# Patient Record
Sex: FEMALE | URBAN - METROPOLITAN AREA
[De-identification: names, ages, dates, MRNs, and addresses within clinical notes are randomized per-mention and may not be internally consistent; named-entity substitution may affect disease eponyms.]

---

## 2019-01-05 ENCOUNTER — V-VISIT (OUTPATIENT)
Dept: INTERNAL MEDICINE | Age: 74
End: 2019-01-05

## 2019-01-05 DIAGNOSIS — J45.21 MILD INTERMITTENT ASTHMA WITH EXACERBATION: Primary | ICD-10-CM

## 2019-01-05 PROCEDURE — 98969 VIDEO E&M VISIT BY A QUALIFIED NONPHYSICIAN PROVIDER: CPT | Performed by: NURSE PRACTITIONER

## 2019-01-05 RX ORDER — LEVALBUTEROL TARTRATE 45 UG/1
1-2 AEROSOL, METERED ORAL EVERY 4 HOURS PRN
Qty: 1 INHALER | Refills: 0 | Status: SHIPPED | OUTPATIENT
Start: 2019-01-05

## 2021-11-02 ENCOUNTER — HOSPITAL ENCOUNTER (OUTPATIENT)
Age: 76
End: 2021-11-02
Payer: MEDICARE

## 2021-11-02 DIAGNOSIS — M20.11: Primary | ICD-10-CM

## 2021-11-02 DIAGNOSIS — M25.571: ICD-10-CM

## 2021-11-02 DIAGNOSIS — M79.671: ICD-10-CM

## 2021-11-02 DIAGNOSIS — M79.672: ICD-10-CM

## 2021-11-02 PROCEDURE — 73610 X-RAY EXAM OF ANKLE: CPT

## 2021-11-02 PROCEDURE — 73630 X-RAY EXAM OF FOOT: CPT

## 2022-10-28 ENCOUNTER — HOSPITAL ENCOUNTER (EMERGENCY)
Age: 77
LOS: 1 days | Discharge: HOME | End: 2022-10-29
Payer: MEDICARE

## 2022-10-28 VITALS
DIASTOLIC BLOOD PRESSURE: 88 MMHG | TEMPERATURE: 97.88 F | HEART RATE: 98 BPM | OXYGEN SATURATION: 98 % | SYSTOLIC BLOOD PRESSURE: 180 MMHG | RESPIRATION RATE: 18 BRPM

## 2022-10-28 VITALS — BODY MASS INDEX: 28.8 KG/M2
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
## 2022-10-28 DIAGNOSIS — S40.021A: ICD-10-CM

## 2022-10-28 DIAGNOSIS — M79.89: ICD-10-CM

## 2022-10-28 DIAGNOSIS — S40.011A: Primary | ICD-10-CM

## 2022-10-28 PROCEDURE — 73080 X-RAY EXAM OF ELBOW: CPT

## 2022-10-28 PROCEDURE — 73110 X-RAY EXAM OF WRIST: CPT

## 2022-10-28 PROCEDURE — 99283 EMERGENCY DEPT VISIT LOW MDM: CPT

## 2022-10-28 PROCEDURE — 73060 X-RAY EXAM OF HUMERUS: CPT

## 2022-10-28 PROCEDURE — 96372 THER/PROPH/DIAG INJ SC/IM: CPT

## 2022-10-28 PROCEDURE — 73030 X-RAY EXAM OF SHOULDER: CPT

## 2022-10-29 VITALS — RESPIRATION RATE: 16 BRPM

## 2022-12-14 ENCOUNTER — HOSPITAL ENCOUNTER (OUTPATIENT)
Dept: HOSPITAL 100 - EN | Age: 77
Discharge: HOME | End: 2022-12-14
Payer: MEDICARE

## 2022-12-14 VITALS
OXYGEN SATURATION: 93 % | HEART RATE: 102 BPM | RESPIRATION RATE: 18 BRPM | DIASTOLIC BLOOD PRESSURE: 85 MMHG | SYSTOLIC BLOOD PRESSURE: 92 MMHG

## 2022-12-14 VITALS
DIASTOLIC BLOOD PRESSURE: 85 MMHG | HEART RATE: 101 BPM | SYSTOLIC BLOOD PRESSURE: 96 MMHG | OXYGEN SATURATION: 92 % | TEMPERATURE: 97.2 F | RESPIRATION RATE: 18 BRPM

## 2022-12-14 VITALS
HEART RATE: 88 BPM | OXYGEN SATURATION: 93 % | RESPIRATION RATE: 18 BRPM | TEMPERATURE: 96.8 F | SYSTOLIC BLOOD PRESSURE: 101 MMHG | DIASTOLIC BLOOD PRESSURE: 53 MMHG

## 2022-12-14 VITALS — BODY MASS INDEX: 28 KG/M2

## 2022-12-14 VITALS
SYSTOLIC BLOOD PRESSURE: 83 MMHG | RESPIRATION RATE: 18 BRPM | HEART RATE: 96 BPM | OXYGEN SATURATION: 95 % | DIASTOLIC BLOOD PRESSURE: 51 MMHG

## 2022-12-14 VITALS
TEMPERATURE: 97.34 F | SYSTOLIC BLOOD PRESSURE: 136 MMHG | DIASTOLIC BLOOD PRESSURE: 85 MMHG | OXYGEN SATURATION: 100 % | HEART RATE: 98 BPM | RESPIRATION RATE: 18 BRPM

## 2022-12-14 VITALS
SYSTOLIC BLOOD PRESSURE: 95 MMHG | HEART RATE: 96 BPM | OXYGEN SATURATION: 97 % | RESPIRATION RATE: 18 BRPM | DIASTOLIC BLOOD PRESSURE: 85 MMHG

## 2022-12-14 VITALS — SYSTOLIC BLOOD PRESSURE: 136 MMHG | DIASTOLIC BLOOD PRESSURE: 85 MMHG

## 2022-12-14 DIAGNOSIS — K57.30: ICD-10-CM

## 2022-12-14 DIAGNOSIS — Z79.82: ICD-10-CM

## 2022-12-14 DIAGNOSIS — Z12.11: Primary | ICD-10-CM

## 2022-12-14 DIAGNOSIS — K29.50: ICD-10-CM

## 2022-12-14 DIAGNOSIS — K31.89: ICD-10-CM

## 2022-12-14 DIAGNOSIS — Z79.899: ICD-10-CM

## 2022-12-14 DIAGNOSIS — K44.9: ICD-10-CM

## 2022-12-14 DIAGNOSIS — K21.9: ICD-10-CM

## 2022-12-14 DIAGNOSIS — I10: ICD-10-CM

## 2022-12-14 DIAGNOSIS — E78.00: ICD-10-CM

## 2022-12-14 PROCEDURE — 88342 IMHCHEM/IMCYTCHM 1ST ANTB: CPT

## 2022-12-14 PROCEDURE — 0DJD8ZZ INSPECTION OF LOWER INTESTINAL TRACT, VIA NATURAL OR ARTIFICIAL OPENING ENDOSCOPIC: ICD-10-PCS | Performed by: SURGERY

## 2022-12-14 PROCEDURE — 43239 EGD BIOPSY SINGLE/MULTIPLE: CPT

## 2022-12-14 PROCEDURE — 88305 TISSUE EXAM BY PATHOLOGIST: CPT

## 2024-09-19 ENCOUNTER — HOSPITAL ENCOUNTER (EMERGENCY)
Age: 79
Discharge: HOME | End: 2024-09-19
Payer: MEDICARE

## 2024-09-19 VITALS — OXYGEN SATURATION: 99 % | HEART RATE: 140 BPM | RESPIRATION RATE: 18 BRPM

## 2024-09-19 VITALS — OXYGEN SATURATION: 100 %

## 2024-09-19 VITALS — BODY MASS INDEX: 30.2 KG/M2

## 2024-09-19 VITALS
OXYGEN SATURATION: 100 % | RESPIRATION RATE: 18 BRPM | DIASTOLIC BLOOD PRESSURE: 143 MMHG | SYSTOLIC BLOOD PRESSURE: 169 MMHG | HEART RATE: 119 BPM

## 2024-09-19 VITALS
DIASTOLIC BLOOD PRESSURE: 94 MMHG | HEART RATE: 118 BPM | TEMPERATURE: 97.9 F | SYSTOLIC BLOOD PRESSURE: 185 MMHG | RESPIRATION RATE: 20 BRPM | OXYGEN SATURATION: 96 %

## 2024-09-19 VITALS
OXYGEN SATURATION: 95 % | SYSTOLIC BLOOD PRESSURE: 166 MMHG | RESPIRATION RATE: 22 BRPM | TEMPERATURE: 98.42 F | HEART RATE: 123 BPM | DIASTOLIC BLOOD PRESSURE: 116 MMHG

## 2024-09-19 VITALS — OXYGEN SATURATION: 92 %

## 2024-09-19 DIAGNOSIS — Z79.899: ICD-10-CM

## 2024-09-19 DIAGNOSIS — Z98.42: ICD-10-CM

## 2024-09-19 DIAGNOSIS — E78.00: ICD-10-CM

## 2024-09-19 DIAGNOSIS — I10: ICD-10-CM

## 2024-09-19 DIAGNOSIS — Z98.41: ICD-10-CM

## 2024-09-19 DIAGNOSIS — Z90.710: ICD-10-CM

## 2024-09-19 DIAGNOSIS — J45.909: ICD-10-CM

## 2024-09-19 DIAGNOSIS — Z90.49: ICD-10-CM

## 2024-09-19 DIAGNOSIS — Z95.5: ICD-10-CM

## 2024-09-19 DIAGNOSIS — Z79.82: ICD-10-CM

## 2024-09-19 DIAGNOSIS — T59.811A: Primary | ICD-10-CM

## 2024-09-19 DIAGNOSIS — Z87.891: ICD-10-CM

## 2024-09-19 LAB
ANION GAP: 7 (ref 5–15)
BUN SERPL-MCNC: 14 MG/DL (ref 7–18)
BUN/CREAT RATIO: 11.1 RATIO (ref 10–20)
CALCIUM SERPL-MCNC: 9.8 MG/DL (ref 8.5–10.1)
CARBON DIOXIDE: 25 MMOL/L (ref 21–32)
CHLORIDE: 107 MMOL/L (ref 98–107)
DEPRECATED RDW RBC: 43 FL (ref 35.1–43.9)
ERYTHROCYTE [DISTWIDTH] IN BLOOD: 13.2 % (ref 11.6–14.6)
EST GLOM FILT RATE - AFR AMER: 53 ML/MIN (ref 60–?)
ESTIMATED CREATININE CLEARANCE: 36.39 ML/MIN
GLUCOSE: 123 MG/DL (ref 74–106)
HCT VFR BLD AUTO: 37.6 % (ref 37–47)
HEMOGLOBIN: 12.1 G/DL (ref 12–15)
HGB BLD-MCNC: 12.1 G/DL (ref 12–15)
IMMATURE GRANULOCYTES COUNT: 0.03 X10^3/UL (ref 0–0)
MCV RBC: 88.3 FL (ref 81–99)
MEAN CORP HGB CONC: 32.2 G/DL (ref 32–36)
MEAN PLATELET VOL.: 11.1 FL (ref 6.2–12)
NRBC FLAGGED BY ANALYZER: 0 % (ref 0–5)
PLATELET # BLD: 212 K/MM3 (ref 150–450)
PLATELET COUNT: 212 K/MM3 (ref 150–450)
POTASSIUM: 3.4 MMOL/L (ref 3.5–5.1)
RBC # BLD AUTO: 4.26 M/MM3 (ref 4.2–5.4)
RBC DISTRIBUTION WIDTH CV: 13.2 % (ref 11.6–14.6)
RBC DISTRIBUTION WIDTH SD: 43 FL (ref 35.1–43.9)
WBC # BLD AUTO: 7.3 K/MM3 (ref 4.4–11)
WHITE BLOOD COUNT: 7.3 K/MM3 (ref 4.4–11)

## 2024-09-19 PROCEDURE — 99284 EMERGENCY DEPT VISIT MOD MDM: CPT

## 2024-09-19 PROCEDURE — 85025 COMPLETE CBC W/AUTO DIFF WBC: CPT

## 2024-09-19 PROCEDURE — A4216 STERILE WATER/SALINE, 10 ML: HCPCS

## 2024-09-19 PROCEDURE — 94640 AIRWAY INHALATION TREATMENT: CPT

## 2024-09-19 PROCEDURE — 80048 BASIC METABOLIC PNL TOTAL CA: CPT

## 2024-09-19 PROCEDURE — 71046 X-RAY EXAM CHEST 2 VIEWS: CPT
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
## 2024-09-19 NOTE — XMS RPT_ITS
Comprehensive CCD (C-CDA v2.1)  
  
                         Created on: 2024  
  
  
MS. ELMIRA WARNER  
External Reference #: CDR,PersonID:[id number]  
: 1945  
Sex: Female  
  
Demographics  
  
  
                                        Address             [address] [place]  [address]  
   
                                        Home Phone          [phone]  
   
                                        Home Phone          [phone]  
   
                                        Home Phone          [phone]  
   
                                        Home Phone          [phone]  
   
                                        Work Phone          [phone]  
   
                                        Preferred Language  en  
   
                                        Marital Status        
   
                                        Nondenominational Affiliation Unknown  
   
                                        Race                White  
   
                                        Ethnic Group        Not  or Lati  
no  
  
  
Author  
  
  
                                        Organization        [facility]  
  
  
Care Team Providers  
  
  
                                Care Team Member Name Role            Phone  
   
                                No, Physician   Unavailable     Unavailable  
   
                                QUICK, DAIJA    Unavailable     Unavailable  
   
                                IRLANDA FUCHS   Attending       Unavailable  
   
                                IRLANDA FUCHS   Attending       Unavailable  
   
                                IRLANDA FUCHS   Referring       Unavailable  
   
                                No, Physician   Primary Care Provider Unavailmatthew  
e  
   
                                BEBETO RODRIGUEZ Primary Care    Unavailable  
   
                                QUICK, DAIJA S  Referring       Unavailable  
   
                                QUICK, DAIJA S  Attending       Unavailable  
   
                                QUICK, DAIJA S  Attending       Unavailable  
   
                                BEBETO RODRIGUEZ Primary Care    Unavailable  
   
                                QUICK, DAIJA S  Referring       Unavailable  
   
                                Bebeto Rodriguez Primary Care Provider [phone]  
  
   
                                Bebeto Rodriguez DO Primary Care Provider [phone]  
[phone]  
   
                                Bebeto Rodriguez DO Primary Care Provider [phone]  
[phone]  
   
                                Bebeto Rodriguez DO Primary Care Provider [phone]  
[phone]  
   
                                Bebeto Rodriguez DO Primary Care Provider [phone]  
[phone]  
   
                                Bebeto Rodriguez DO Primary Care Provider [phone]  
[phone]  
   
                                JOHN KUMARI Attending       Unavailable  
   
                                BEBETO RODRIGUEZ Primary Care    Unavailable  
   
                                BEBETO RODRIGUEZ Primary Care    Unavailable  
   
                                JOHN KUMARI Attending       Unavailable  
   
                                BEBETO RODRIGUEZ Primary Care    Unavailable  
   
                                ANAHY FINNEGAN Attending       Unavailable  
   
                                Bebeto Rodriguez Primary Care Provider [phone]  
  
   
                                LAURA TINAJERO Attending       Unavailable  
   
                                BEBETO RODRIGUEZ Primary Care    Unavailable  
   
                                LAURA TINAJERO Referring       Unavailable  
   
                                BEBETO RODRIGUEZ Primary Care    Unavailable  
   
                                Bebeto Rodriguez DO Primary Care Provider [phone]  
[phone]  
   
                                BEBETO RODRIGUEZ Primary Care    Unavailable  
   
                                MARIA E GUZMÁN Attending       Unavailable  
   
                                EUGENE SEPULVEDA JR Referring       Unavailable  
   
                                EUGENE SEPULVEDA JR Referring       Unavailable  
   
                                RODRIGUEZ, BEBETO L Primary Care    Unavailable  
   
                                RODRIGUEZ, BEBETO L Referring       Unavailable  
   
                                EUGENE SEPULVEDA JR Attending       Unavailable  
   
                                RODRIGUEZ, BEBETO L Primary Care    Unavailable  
   
                                RODRIGUEZ, BEBETO L Referring       Unavailable  
   
                                RODRIGUEZ, BEBETO L Primary Care    Unavailable  
   
                                RODRIGUEZ, BEBETO L Referring       Unavailable  
   
                                RODRIGUEZ, BEBETO L Primary Care    Unavailable  
   
                                RODRIGUEZ, BEBETO L Referring       Unavailable  
   
                                RODRIGUEZ, BEBETO L Primary Care    Unavailable  
   
                                RODRIGUEZ, BEBETO L Attending       Unavailable  
   
                                RODRIGUEZ, BEBETO L Primary Care    Unavailable  
   
                                RODRIGUEZ, BEBETO L Primary Care    Unavailable  
   
                                SLEIK, KHALED MELOUD Referring       Unavailable  
   
                                SLEIK, KHALED MELOUD Attending       Unavailable  
   
                                RODRIGUEZ, BEBETO L Primary Care    Unavailable  
   
                                SHEN, YANET  Referring       Unavailable  
   
                                RODRIGUEZ, BEBETO L Primary Care    Unavailable  
   
                                SHEN, YANET  Attending       Unavailable  
   
                                RODRIGUEZ, BEBETO L Primary Care    Unavailable  
   
                                COCO ELLIS, EUGENE J Referring       Unavailable  
   
                                RODRIGUEZ, BEBETO L Primary Care    Unavailable  
   
                                SHEN, YANET  Attending       Unavailable  
   
                                RODRIGUEZ, BEBETO L Primary Care    Unavailable  
   
                                LIZETTE BROWNE Attending       Unavailable  
  
  
  
Allergies  
  
  
                                                    Allergy   
Classification                          Reported   
Allergen(s)               Allergy Type              Date of   
Onset                     Reaction(s)               Facility  
   
                                                      
(6 sources)                             atorvastatin;   
Translations:   
[ATORVASTATIN]                          Propensity to   
adverse   
reactions to   
drug                                      
5                                                   [facility]  
   
                                                      
(20 sources)                            iodine;   
Translations:   
[IODINE]                                Propensity to   
adverse   
reactions to   
drug                                      
2                                       GI   
Intolerance,   
Other: See   
Comments,   
Nausea And   
Vomiting,   
Other (See   
Comments),   
Other                                   [facility]  
   
                                                      
(11 sources)                            meperidine;   
Translations:   
[MEPERIDINE]                            Propensity to   
adverse   
reactions to   
drug                                      
2                                       GI   
Intolerance,   
Nausea And   
Vomiting,   
Other,   
Vomiting Only                           [facility]  
   
                                                      
(20 sources)                            meperidine;   
Translations:   
[MEPERIDINE   
(PF)]                                   Propensity to   
adverse   
reactions to   
drug                                      
2                         Vomiting                  [facility]  
   
                                                      
(10 sources)                            Penicillins;   
Translations:   
[PENICILLINS]                           Propensity to   
adverse   
reactions to   
drug                                      
2                                       Hives, Rash,   
Swelling                                [facility]  
   
                                                      
(20 sources)                            ADHESIVE   
TAPE-SILICONES;   
Translations:   
[ADHESIVE   
TAPE-SILICONES]                         Propensity to   
adverse   
reactions to   
drug                                      
2                         Rash                      [facility]  
   
                                                      
(3 sources)                             CT: IODINATED   
CONTRAST- ORAL   
AND IV DYE;   
Translations:   
[CT: IODINATED   
CONTRAST- ORAL   
AND IV DYE]                             Propensity to   
adverse   
reactions to   
drug                                      
7                                                   [facility]  
   
                                                      
(20 sources)                            INFLUENZA   
VACCINE TRI-SP   
09-10;   
Translations:   
[INFLUENZA   
VACCINE TRI-SP   
09-10]                                  Propensity to   
adverse   
reactions to   
drug                                      
2                         Diarrhea                  [facility]  
   
                                                      
(4 sources)                             SHELLFISH   
CONTAINING   
PRODUCTS;   
Translations:   
[SHELLFISH   
CONTAINING   
PRODUCTS]                               Propensity to   
adverse   
reactions to   
drug                                      
2                         Hives                     [facility]  
   
                                                      
(5 sources)                             Adhesive Tape;   
Translations:   
[ADHESIVE TAPE]                         Propensity to   
adverse   
reactions to   
drug (disorder)                           
7                         [facility]   
Repository  
   
                                                      
(2 sources)                             IODINATED   
CONTRAST- ORAL   
AND IV DYE;   
Translations:   
[IODINATED   
CONTRAST- ORAL   
AND IV DYE]                             Propensity to   
adverse   
reactions to   
drug (disorder)                           
7                         [facility]                       [facility]   
Repository  
   
                                                      
(5 sources)         Penicillins         Drug Allergy          
2                         [place]  
Work Phone:   
[phone]  
0  
   
                                                      
(20 sources)                            Shellfish;   
Translations:   
[SHELLFISH]               Food Allergy                
2                         [place]  
Work Phone:   
[phone]  
0  
   
                                                      
(4 sources)         atorvastatin        Drug Allergy          
5                                                   [facility]  
   
                                                      
(4 sources)         Meperidine          Drug Allergy          
2                                       Nausea And   
Vomiting                                [facility]  
Work Phone:   
[phone]  
2  
   
                                                      
(4 sources)                             Shellfish-Derive  
d Products                              Propensity to   
adverse   
reactions to   
drug                                      
2                                                   [facility]  
Work Phone:   
[phone]  
2  
   
                                                      
(5 sources)                             Latex;   
Translations:   
[LATEX]                                 Propensity to   
adverse   
reactions to   
drug                                      
2                         Itching, Rash             [facility]  
   
                                                      
(20 sources)        Penicillins         Drug Allergy          
2                                       Hives, Rash,   
Swelling                                [facility]  
Work Phone:   
[phone]  
0  
   
                                                      
(3 sources)                             Adhesive agent;   
Translations:   
[ADHESIVE]                              Propensity to   
adverse   
reactions to   
drug                                      
2                         [place]  
   
                                                      
(2 sources)               Shellfish                 Propensity to   
adverse   
reactions to   
drug                                      
2                         [place]  
   
                                                      
(3 sources)                             Ct: Iodinated   
Contrast- Oral   
And Iv Dye                              Propensity to   
adverse   
reactions to   
drug                                      
7                                       Nausea And   
Vomiting                                [facility]  
   
                                                      
(1 source)                Adhesive Tape             Drug   
Intolerance                               
2                         [facility]  
   
                                                      
(1 source)                Latex                     Propensity to   
adverse   
reactions                                 
2                         Itching, Rash             [facility]  
   
                                                      
(1 source)          Penicillins         Drug Allergy          
2                                       Hives, Rash,   
Swelling                                [facility]  
  
  
  
Medications  
Current Medications  
  
  
  
                      Medication Drug Class(es) Dates      Sig (Normalized) Sig   
(Original)  
   
                                                    albuterol 0.83   
mg/ml inhalation   
solution  
(20 sources)                            beta2-Adrenergic   
Agonist                                 Start:   
2022                                          2.5 mg, Nebulization,   
EVERY 6 HOURS PRN,   
Starting on 22 at 1324,   
Until Discontinued,   
Wheezing, Shortness   
of Breath Initiate RT   
Bronchodilator   
Protocol: Yes  
  
  
  
                                                    Start: 2022  
End: 07-                                     albuterol (PROVENTIL) 2.5 mg  
 /3 mL (0.083 %) nebulizer solution  
   
Indications: Mild intermittent asthma without complication Use 3   
mL via nebulizer every 6 hours as needed. 30 mL 07/15/2024 Active  
   
                                Start: 2022                 albuterol (2.5  
 MG/3ML) 0.083% nebulizer solution 2.5 mg. 0   
2022 Active  
   
                                                                albuterol (PROVE  
NTIL) 2.5 mg /3 mL (0.083 %) nebulizer solution   
Inhale 3 mL (2.5 mg total) . 0 Active  
   
                                                                albuterol (PROVE  
NTIL) 2.5 mg /3 mL (0.083 %) nebulizer solution   
Inhale 2.5 mg. 0 Active  
   
                                                                albuterol (PROVE  
NTIL) 2.5 mg /3 mL (0.083 %) nebulizer solution   
Inhale 2.5 mg. Active  
  
  
  
                                        Comment on above:   Use 3 mL via nebuliz  
er every 6 hours as needed.   
   
                                                    ascorbic acid 1000   
mg oral tablet  
(20 sources)                                        Start:   
2022                                          ascorbic acid   
(Vitamin C) 1000   
MG tablet Take by   
mouth. 0   
2022 Active  
   
                                        Comment on above:   Take 1,000 mg by solitario  
 once daily.   
   
                                                    aspirin 81 mg   
delayed release   
oral tablet  
(20 sources)                            Nonsteroidal   
Anti-inflammatory Drug                  Start:   
2022                              take 1 dose by   
mouth once                              162 mg, Oral,   
ONCE, 1 dose, On   
22 at   
1345 Do not crush   
or break.  
  
  
  
                                                take 2 tablets by mouth once mikie  
ly Aspirin 81 mg Tab Take 162 mg by mouth once   
daily. Active  
   
                                                take 1 tablet by mouth once dorothea  
y Aspirin 81 mg Tab Take 81 mg by mouth once   
daily. 0 Active  
   
                                                                aspirin 81 MG ch  
ewable tablet Chew 81 mg. 0   
Active  
   
                                                take 1 tablet by mouth once dorothea  
y aspirin 81 MG EC tablet Take 1 (one) tablet   
(81   
mg total) by mouth daily . 0 Active  
   
                                                                aspirin 81 MG EC  
 tablet 162 mg 0 Suspended  
  
  
  
                                        Comment on above:   Take 81 mg by mouth   
once daily.  
   
   
                                                    aspirin 81 mg / calcium   
carbonate 777 mg oral   
tablet  
(1 source)                              Platelet Aggregation   
Inhibitor, Nonsteroidal   
Anti-inflammatory Drug                                         Aspirin-Calcium   
Carbonate  MG   
tablet Take 81 mg by   
mouth. 0 Active  
   
                                                    bisacodyl 5 mg delayed   
release oral tablet  
(20 sources)              Stimulant Laxative        Start:   
2021                                          Bisacodyl (DULCOLAX) 5   
mg tab Indications:   
Special screening for   
malignant neoplasms,   
colon Use as directed   
for Miralax / Gatorade   
Bowel Prep Kit 4   
tablet 2021   
Active  
  
  
  
                                Start: 2021                 bisacodyl (DUL  
COLAX) 5 MG EC tablet Use as directed for   
Miralax /   
Gatorade Bowel Prep Kit 0 2021 Suspended  
  
  
  
                                        Comment on above:   Use as directed for   
Miralax / Gatorade Bowel Prep Kit   
   
                                                    bisoprolol fumarate 5 mg   
oral tablet  
(5 sources)                             beta-Adrenergic   
Blocker                                 Start:   
2018                                          bisoprolol (ZEBETA) 5   
MG tablet  
   
                                                    brimonidine tartrate 2 mg/ml  
   
ophthalmic solution  
(4 sources)                             alpha-Adrenergic   
Agonist                                 Start:   
2018                                          brimonidine (ALPHAGAN)   
0.2 % ophthalmic   
solution  
   
                                                    CALCIUM CARB-CHOLECALCIFEROL  
   
PO  
(1 source)                                                      CALCIUM   
CARB-CHOLECALCIFEROL PO   
Take by mouth. 0 Active  
   
                                                    calcium carbonate 1500 mg /   
cholecalciferol 200 unt oral   
tablet  
(7 sources)     Vitamin D                                       calcium   
carbonate-vitamin D3   
600 mg(1,500mg) -200   
unit per tablet Take by   
mouth. 0 Active  
  
  
  
                                                take 600 tablets by mouth once c  
alcium carbonate-vitamin D3 600 mg(1,500mg) -  
200   
unit per tablet Take by mouth. Active  
  
  
  
                                                    cetirizine   
hydrochloride 10 mg   
oral tablet  
(2 sources)                             Histamine-1   
Receptor   
Antagonist                              Start:   
2024  
End: 2024                         take 1 tablet   
by mouth once   
daily                                   cetirizine   
(ZYRTEC) 10 mg   
tablet   
Indications: Mild   
intermittent   
asthma without   
complication ,   
Seasonal allergies   
Take 1 tablet by   
mouth once daily.   
30 tablet 2   
2024 Active  
   
                                                    cholecalciferol 0.025   
mg oral capsule  
(20 sources)        Vitamin D                               take 1   
capsule by   
mouth once   
daily                                   Cholecalciferol,   
Vitamin D3, 25 mcg   
(1,000 unit) cap   
Take 1,000 Units   
by mouth once   
daily. Active  
  
  
  
                                                                cholecalciferol,  
 vitamin D3, 1,000 unit capsule Take 1 (one) capsule (1,000   
Units   
total) by mouth . 0 Active  
   
                                                                Cholecalciferol   
50 MCG (2000 UT) CAPS D3-2000 50 mcg (2,000 unit) capsule Take  
 by   
oral route. 0 Suspended  
  
  
  
                                        Comment on above:   Take 1,000 Units by   
mouth once daily.   
   
                                                    clopidogrel 75 mg oral   
tablet  
(12 sources)                            P2Y12 Platelet   
Inhibitor                               Start:   
2018  
End:   
04-                                          clopidogrel (PLAVIX) 75   
mg tablet  
   
                                        Comment on above:   Take 75 mg by mouth   
once daily.   
   
                                                    ubidecarenone 10 mg oral   
capsule  
(20 sources)                                          
End:   
2022                                          coenzyme Q10 10 mg   
capsule Take 10 mg by   
mouth. 0 Active  
  
  
  
                                                                coenzyme Q10 200  
 MG CAPS capsule Co Q-10 200 mg capsule Take by oral route. 0   
Suspended  
  
  
  
                                        Comment on above:   Take 10 mg by mouth   
once daily.   
   
                                                    cyclobenzaprine   
hydrochloride 10 mg   
oral tablet  
(3 sources)               Muscle Relaxant           Start:   
20  
End:   
20                                      take 1 tablet by   
mouth every   
eight hours as   
needed for   
headache and   
headache                                cyclobenzaprine   
(FLEXERIL) 10 mg   
tablet Indications:   
Headaches Take 1   
tablet by mouth three   
times a day as needed   
for muscle spasm. 15   
tablet 0 2023 Active  
  
  
  
                                                    Start: 2022  
End: 2022                         take 1 tablet by mouth three   
times daily as needed for   
muscle spasms                           cyclobenzaprine (FLEXERIL) 5 MG tablet   
Take 1 tablet by mouth 3 times daily   
as needed for Muscle spasms 30 tablet   
0 2022 Active  
  
  
  
                                        Comment on above:   Take 1 tablet by solitario  
 three times a day as needed for muscle   
spasm.   
   
                                                    1 ml dexamethasone   
phosphate 4 mg/ml   
injection  
(2 sources)               Corticosteroid            Start:   
                                                 6 mg, IntraVENous,   
EVERY 6 HOURS, First   
dose on Mon 22 at   
1345, Post-op  
   
                                                    docusate sodium 100   
mg oral capsule  
(2 sources)                                         Start:   
  
End:   
                                     take 1 capsule   
by mouth twice   
daily                                   docusate sodium   
(COLACE) 100 MG capsule   
Take 1 capsule by mouth   
2 times daily for 14   
days 28 capsule 0   
2022   
Active  
   
                                                    famotidine 40 mg   
oral tablet  
(4 sources)                             Histamine-2 Receptor   
Antagonist                              Start:   
  
End:   
  
023                                     take 1 tablet   
by mouth once   
daily                                   famotidine (PEPCID) 40   
MG tablet Indications:   
Gastroesophageal reflux   
disease, unspecified   
whether esophagitis   
present Take 1 (one)   
tablet (40 mg total) by   
mouth daily . 90 tablet   
3 2022   
Active  
  
  
  
                                                    Start: 2022  
End: 2022                                     famotidine (PEPCID) tablet 2  
0 mg  
  
  
  
                                                    fluocinolone acetonide   
0.1 mg/ml otic solution  
(3 sources)     Corticosteroid  Start: 2022                 fluocinolone a  
cetonide   
oiL 0.01 % Drop   
Indications: Chronic   
eczematous otitis externa   
of both ears Administer 4   
drops into ears Monday,   
Wednesday, Friday Start:   
22. 20 mL 3   
2022 Active  
  
  
  
                                Start: 2022                 fluocinolone (  
DermOtic) 0.01 % ear drops Administer 4 drops   
into   
ears Monday, Wednesday, Friday Start 22. 0 2022   
Active  
  
  
  
                                                    folic acid 0.8 mg oral   
capsule  
(19 sources)                                                    folic acid 0.8 m  
g cap Take   
300 mg by mouth once daily.   
Active  
   
                                        Comment on above:   Take 300 mg by mouth  
 once daily.   
   
                                                    Lactobac no.41/Bifidobact   
no.7 (PROBIOTIC-10 ORAL)  
(20 sources)                                                    Lactobac no.41/B  
ifidobact   
no.7 (PROBIOTIC-10 ORAL)   
Take by mouth. Active  
  
  
  
                                                                Lactobac no.41/B  
ifidobact no.7 (PROBIOTIC-10 ORAL) Take by mouth. 0 Active  
  
  
  
                                        Comment on above:   Take by mouth.   
   
                                                    200 actuat levalbuterol   
0.045 mg/actuat metered   
dose inhaler  
(20 sources)                            beta2-Adrenergic   
Agonist                                 Start:   
2022                                          levalbuterol (XOPENEX)   
nebulization 0.63 mg  
  
  
  
                                                    Start: 04-  
End: 2023                         take 2 puff(s) by inhalation   
every six hours as needed               levalbuterol tartrate HFA (XOPENEX   
HFA) 45 mcg/actuation inhaler   
Indications: Mild intermittent   
asthma, uncomplicated Inhale 2   
Puffs as instructed every 6 hours   
as needed. 1 Each 1 2023   
Active  
   
                                                      
End: 2023                         take 2 puff(s) by inhalation   
every six hours as needed               levalbuterol (Xopenex) 45 MCG/ACT   
inhaler levalbuterol HFA 45   
mcg/actuation aerosol inhaler   
Inhale 2 Puffs as instructed every   
6 hours as needed. 0 2023   
Discontinued (Therapy completed)  
   
                                                                levalbuterol (XO  
PENEX HFA) 45   
mcg/actuation inhaler Inhale. 0   
Active  
   
                                                                levalbuterol (XO  
PENEX HFA) 45   
mcg/actuation inhaler Inhale.   
Active  
  
  
  
                                        Comment on above:   Inhale 2 Puffs as in  
structed every 6 hours as needed.   
   
                                                    linseed oil 1000 mg   
oral capsule  
(4 sources)                                                 take 1 capsule   
by mouth once   
daily                                   Flaxseed, Linseed,   
(Flax Seed Oil) 1000   
MG capsule Take 1   
capsule by mouth   
daily. 0 Active  
   
                                                    losartan potassium   
50 mg oral tablet  
(7 sources)                             Angiotensin 2   
Receptor Blocker                        Start:   
                                       take 1 tablet by   
mouth once daily                        losartan (COZAAR) 50   
mg tablet   
Indications:   
Essential   
hypertension Take 1   
tablet by mouth once   
daily. 2024   
Active  
  
  
  
                                                    Start: 2024  
End: 10-                         take 1 tablet by mouth once   
daily                                   losartan (COZAAR) 25 mg tablet   
Indications: Essential hypertension   
Take 1 tablet by mouth once daily.   
90 tablet 0 2024   
Discontinued  
  
  
  
                                                    magnesium oxide 400 mg   
oral tablet  
(20 sources)                                          
End: 2022                                     magnesium oxide   
(MAG-OX) 400 mg tablet   
Take by mouth. 0 Active  
   
                                        Comment on above:   Take 400 mg by mouth  
 once daily.   
   
                                                    10 ml methocarbamol 100   
mg/ml injection  
(4 sources)     Muscle Relaxant Start: 2022                 methocarbamol   
(ROBAXIN)   
injection 1,000 mg  
  
  
  
                                                    Start: 2022  
End: 2022                         take 2 tablets by mouth three   
times daily                             methocarbamol (ROBAXIN-750) 750 MG   
tablet Take 2 tablets by mouth 3   
times daily for 14 days 84 tablet 0   
2022 Discontinued   
(Stop Taking at Discharge)  
  
  
  
                                                    1 ml morphine sulfate 4   
mg/ml injection  
(4 sources)     Opioid Agonist  Start: 2022                 morphine sulfa  
te (PF)   
injection 1 mg  
  
  
  
                                Start: 2022                 morphine sulfa  
te (PF) injection 2 mg  
   
                                                    Start: 2022  
End: 2022                         take 4 mg by mouth every three   
hours as needed for pain                4 mg, IntraVENous, EVERY 3 HOURS   
PRN, Starting on 22 at   
1324, Until 22 at 1505,   
Pain Severe (7-10) If oral and IV   
narcotics ordered, use oral first   
and only use IV if oral is   
ineffective or cannot take   
oral.&nbsp;&nbsp;Do Not give oral   
and IV within 1 hour of each other   
unless specifically ordered. Post-op  
  
  
  
                                                    Multiple Vitamins-Minerals (  
Centrum   
Silver 50+Women) tablet  
(1 source)                                                      Multiple Vitamin  
s-Minerals (Centrum   
Silver 50+Women) tablet Take by mouth.   
0 Active  
   
                                                    Multivitamin preparation  
(20 sources)                                                    MULTIVITAMIN (VI  
TAMIN A DAY ORAL) Take   
by mouth. Active  
  
  
  
                                                                MULTIVITAMIN (VI  
TAMIN A DAY ORAL) Take by mouth. 0 Active  
  
  
  
                                        Comment on above:   Take by mouth.   
   
                                                    naproxen 500 mg   
oral tablet  
(8 sources)                             Nonsteroidal   
Anti-inflammatory   
Drug                                    Start:   
20  
End:   
20                                      take 1 tablet by   
mouth twice   
daily at   
mealtime as   
needed for pain                         naproxen (Naprosyn)   
500 MG tablet Take 1   
tablet by mouth twice   
daily as needed (for   
pain/inflammation)   
for up to 14 days.   
Take with food. 0   
2022 Active  
   
                                        Comment on above:   Take 1 tablet by [facility] twice daily as needed (for   
pain/inflammation) for up to 14 days. Take with food.   
   
                                                    Nebulizers (AERONEB   
GO NEBULIZER)  
(20 sources)                                        Start:   
04-15-20  
20                                                  Nebulizers (AERONEB   
GO NEBULIZER)   
Indications: Mild   
intermittent asthma,   
uncomplicated 1 Each   
as directed. Portable   
Nebulizer with   
tubing, Dx: mild   
persistent asthma 1   
Each 04/15/2020   
Active  
  
  
  
                                Start: 04-                 Nebulizers (AE  
RONEB GO NEBULIZER) Indications: Mild   
intermittent   
asthma, uncomplicated 1 Each as directed. Portable Nebulizer with   
tubing, Dx: mild persistent asthma 1 Each 0 04/15/2020 Active  
  
  
  
                                        Comment on above:   1 Each as directed.   
Portable Nebulizer with tubing, Dx: mild   
persistent asthma   
   
                                                    nitroglycerin 0.4 mg   
sublingual tablet  
(20 sources)              Nitrate Vasodilator       Start:   
01-  
End:   
10-                                          nitroglycerin sublingual   
(NITROSTAT) 0.4 mg SL   
tablet Indications:   
Coronary artery disease   
involving native heart   
without angina pectoris,   
unspecified vessel or   
lesion type Dissolve 1   
tablet under the tongue   
every 5 minutes as   
needed for chest pain.   
Max of 3 doses. If no   
relief, call 911. 25   
tablet 07/15/2024   
10/13/2024 Active  
  
  
  
                                                                nitroglycerin (N  
itrostat) 0.4 MG SL tablet nitroglycerin 0.4 mg sublingual   
tablet   
0 Active  
  
  
  
                                        Comment on above:   Dissolve 1 tablet un  
becka the tongue every 5 minutes as needed   
for Chest Pain. Max of 3 doses. If no relief, call 911.   
   
                                                    ofloxacin 3 mg/ml   
ophthalmic solution  
(4 sources)               Quinolone Antimicrobial   Start:   
2018                                          ofloxacin (OCUFLOX) 0.3   
% ophthalmic solution  
   
                                                    ondansetron (ZOFRAN-ODT)   
disintegrating tablet 4   
mg  
(2 sources)                                         Start:   
2022                                          ondansetron (ZOFRAN-ODT)   
disintegrating tablet 4   
mg  
   
                                                    oxyCODONE hydrochloride 5   
mg oral tablet  
(3 sources)               Opioid Agonist            Start:   
2022                                          oxyCODONE (ROXICODONE)   
immediate release tablet   
2.5 mg  
  
  
  
                                Start: 2022                 oxyCODONE (RAPHAEL  
ICODONE) immediate release tablet 5 mg  
  
  
  
                                                    phenol 14 mg/ml   
mouthwash  
(1 source)                      Start: 2022                 phenol 1.4 % m  
outh spray   
1 spray  
   
                                                    polyethylene glycol 3350   
06339 mg powder for oral   
solution  
(9 sources)               Osmotic Laxative          Start: 04-  
End: 05-                                     polyethylene glycol 3350   
(MIRALAX) 17 gram/dose   
powder Indications:   
Chronic constipation   
Take 17 g by mouth as   
needed for constipation.   
116 g 0 04/15/2022   
05/15/2022 Active  
  
  
  
                                Start: 2021                 polyethylene g  
lycol (GLYCOLAX) 17 GM/SCOOP powder Use as   
directed   
for Miralax / Gatorade Bowel Prep Kit 0 2021 Suspended  
   
                                                    Start: 2021  
End: 04-                                     polyethylene glycol 3350 (MI  
RALAX, GLYCOLAX) 17 gram/dose   
powder   
Indications: Special screening for malignant neoplasms, colon Use   
as directed for Miralax / Gatorade Bowel Prep Kit 238 g 0   
2021 04/15/2022 Discontinued  
  
  
  
                                        Comment on above:   Use as directed for   
Miralax / Gatorade Bowel Prep Kit   
   
                                                            Take 17 g by mouth a  
s needed for constipation.   
   
                                                    potassium chloride,   
bulk, Powd  
(3 sources)                                                     potassium chlori  
de, bulk,   
Powd Take 10 mEq by   
mouth. 0 Active  
   
                                                    potassium gluconate 2.6   
meq oral tablet  
(20 sources)                                        Start:   
2022                                          1 gram potassium   
gluconate = 167 mg   
elemental potassium = 4.3   
mEq potassium = 4.3 mmol   
potassium 550 mg, Oral,   
DAILY, First dose on 22 at 1345, Until   
Discontinued  
  
  
  
                                                            take 1 tablet by solitario  
th once   
daily                                   Potassium Gluconate 2.5 MEQ tablet   
Take 1 tablet by mouth daily. 0   
Active  
   
                                                      
End: 2022                         take 1 tablet by mouth once   
daily                                   Potassium 99 mg tab Take 1 tablet by   
mouth once daily. 0 2022   
Discontinued  
   
                                                            take 1 tablet by solitario  
th once   
daily                                   potassium gluconate 550 mg tablet   
Take 1 tablet by mouth daily 0   
Suspended  
  
  
  
                                        Comment on above:   Take 1 tablet by solitario  
th once daily.   
   
                                                    prednisoLONE acetate   
10 mg/ml ophthalmic   
suspension  
(4 sources)               Corticosteroid            Start:   
20                                                  prednisoLONE acetate   
(PRED FORTE) 1 %   
ophthalmic   
suspension  
   
                                                    saccharomyces   
boulardii 250 mg oral   
capsule  
(4 sources)                                         Start:   
20                                      take 250 mg   
by mouth once   
daily                                   250 mg, Oral, DAILY,   
First dose on 22 at 1345,   
Until Discontinued  
  
  
  
                                                                saccharomyces yesy  
ulardii (FLORASTOR) 250 MG capsule Take by mouth daily 0   
Suspended  
  
  
  
                                                    traMADol   
hydrochloride 50 mg   
oral tablet  
(5 sources)               Opioid Agonist            Start: 2022  
End: 2022                         take 1 tablet   
by mouth   
every six   
hours as   
needed for   
pain                                    traMADol (ULTRAM)   
50 mg tablet   
Indications: Neck   
pain, chronic Take   
1 tablet by mouth   
every 6 hours as   
needed for pain for   
up to 5 days. 20   
tablet 0 2022 Active  
   
                                        Comment on above:   Take 1 tablet by solitario  
th every 6 hours as needed for pain for up  
   
to 5 days.   
   
                                                            Take 50 mg by mouth   
every 6 hours as needed for pain.   
   
                                                    TUMERIC-GING-OLIVE-OR  
EG-CAPRYL ORAL  
(20 sources)                                                    TUMERIC-GING-GUSTAVO  
VE-  
OREG-CAPRYL ORAL   
Take by mouth.   
Active  
  
  
  
                                                                TUMERIC-GING-GUSTAVO  
TS-WOWF-YIWNGA ORAL Take by mouth. 0 Active  
  
  
  
                                        Comment on above:   Take by mouth.   
   
                                                    vit B complex no.12/niacin,B  
3,   
(VITAMIN B COMPLEX NO.12-NIACIN   
ORAL)  
(20 sources)                                                    vit B complex no  
.12/niacin,B3,   
(VITAMIN B COMPLEX NO.12-NIACIN   
ORAL) Take by mouth. Active  
  
  
  
                                                                vit B complex no  
.12/niacin,B3, (VITAMIN B COMPLEX NO.12-NIACIN ORAL) Take by   
mouth. 0 Active  
  
  
  
                                        Comment on above:   Take by mouth.   
   
                                                    vit B complex-C-folic ac-zin  
c 800   
mcg- 12.5 mg tab  
(19 sources)                                                    vit B complex-C-  
folic ac-zinc   
800 mcg- 12.5 mg tab Take by   
mouth. Active  
  
  
  
                                                                vit B complex-C-  
folic ac-zinc 800 mcg- 12.5 mg tab Take by mouth. 0 Active  
  
  
  
                                        Comment on above:   Take by mouth.   
   
                                                    vitamin a 97964 unt oral   
tablet  
(4 sources)     Vitamin A       Start: 2022                 Vitamin A (bet  
a carotene)   
3 MG (55657 UT) tablet   
Take by mouth. 0   
2022 Active  
  
  
  
                                                take 1 capsule by mouth once mikie  
ly Vitamin A 2400 MCG (8000 UT) CAPS vitamin A   
2,400 mcg capsule Take 1 capsule every day by   
oral route. 0 Suspended  
  
  
  
                                                    vitamin b6 50 mg oral tablet  
(7 sources)                                                     pyridoxine, linda  
min B6, (B-6) 50 MG tablet Take 2   
(two) tablets (100 mg total) by mouth . 0 Active  
  
  
  
                                                take 1 tablet by mouth once dorothea  
y pyridoxine (B-6) 100 MG tablet Take 1 tablet   
by   
mouth daily 0 Suspended  
   
                                                                pyridoxine, linda  
min B6, (B-6) 50 MG tablet Take   
100 mg by mouth. 0 Active  
  
  
  
Completed/Discontinued Medications  
  
  
  
                      Medication Drug Class(es) Dates      Sig (Normalized) Sig   
(Original)  
   
                                                    acetaminophen 500 mg   
oral tablet  
(4 sources)                                         Start:   
2022  
End:   
2022                              take 1000 mg by   
mouth every six   
hours as needed,   
then take 4000 mg   
by mouth every   
twenty-four hours   
as needed                               1,000 mg, Oral,   
EVERY 6 HOURS PRN,   
Starting on 22 at 1324,   
Until Discontinued,   
Pain Mild (1-3)   
Maximum dose of   
acetaminophen is   
4000 mg from all   
sources in 24   
hours. Post-op  
   
                                                    acetaminophen 325 mg   
/ oxyCODONE   
hydrochloride 5 mg   
oral tablet  
(19 sources)              Opioid Agonist            Start:   
2022  
End:   
2022                                          oxyCODONE-acetamino  
phen (PERCOCET)   
5-325 mg tablet as   
needed for pain. 0   
2022   
Discontinued  
  
  
  
                                                    Start: 2022  
End: 2022                         take 1-2 tablets by mouth   
every six hours as needed   
for pain, then take 6   
tablets by mouth once   
daily as needed for pain,   
then take 6 tablets by   
mouth once daily as needed   
for pain                                oxyCODONE-acetaminophen (PERCOCET) 5-325  
   
MG per tablet Indications: Status post   
spinal surgery Take 1-2 tablets by mouth   
every 6 hours as needed for Pain (MAX 6   
TABLETS DAILY) for up to 7 days. MAX 6   
TABLETS DAILY. Intended supply: 7 days.   
Take lowest dose possible to manage pain   
42 tablet 0 2022 Active  
  
  
  
                                        Comment on above:   as needed for pain.   
   
                                                    Calcium Carbonate /   
vitamin D3  
(20 sources)                                          
End:   
2024                                          CALCIUM CARBONATE/VITAMIN   
D3 (CALCIUM + D ORAL) Take   
by mouth. 0 2024   
Discontinued  
  
  
  
                                                                CALCIUM CARBONAT  
E/VITAMIN D3 (CALCIUM + D ORAL) Take by mouth. 0 Active  
  
  
  
                                        Comment on above:   Take by mouth.   
   
                                                    calcium chloride   
0.0014 meq/ml /   
potassium chloride   
0.004 meq/ml /   
sodium chloride   
0.103 meq/ml /   
sodium lactate   
0.028 meq/ml   
injectable solution  
(2 sources)                                         Start:   
  
2  
End:   
  
2                                                   lactated ringers   
infusion  
   
                                                    ceFAZolin 2000 mg   
injection  
(2 sources)                             Cephalosporin   
Antibacterial                           Start:   
  
2  
End:   
  
2                                                   2,000 mg,   
IntraVENous, EVERY 8   
HOURS, 2 doses, First   
dose on 22   
at 1600, Last dose on   
22 at 0000   
Antimicrobial   
Indications: Surgical   
Prophylaxis Post-op  
   
                                                    celecoxib 200 mg   
oral capsule  
(2 sources)                             Nonsteroidal   
Anti-inflammatory Drug                  Start:   
  
2  
End:   
  
2                                                   celecoxib (CELEBREX)   
capsule 200 mg  
   
                                                    docusate sodium 50   
mg / sennosides,   
usp 8.6 mg oral   
tablet  
(2 sources)                                         Start:   
  
2                                       take 1 tablet   
by mouth once   
daily                                   1 tablet, Oral,   
DAILY, First dose on   
22 at 1345,   
Until Discontinued,   
Post-op  
   
                                                    Gatorade Sports   
Drink  
(3 sources)                                         Start:   
  
1  
End:   
04-  
2                                                   Gatorade Sports Drink   
Indications: Special   
screening for   
malignant neoplasms,   
colon Use as directed   
for Miralax /   
Gatorade Bowel Prep   
Kit 0 2021   
04/15/2022   
Discontinued  
  
  
  
                                Start: 2021                 Gatorade Sport  
s Drink Indications: Special screening for   
malignant neoplasms, colon Use as directed for Miralax / Gatorade   
Bowel Prep Kit 0 2021 Active  
  
  
  
                                        Comment on above:   Use as directed for   
Miralax / Gatorade Bowel Prep Kit   
   
                                                    GLUC COLBERT/CHONDRO COLBERT   
A/VIT C/MN   
(GLUCOSAMINE 1500   
COMPLEX ORAL)  
(20 sources)                                          
End:   
2024                                          GLUC COLBERT/CHONDRO COLBERT A/VIT   
C/MN (GLUCOSAMINE 1500   
COMPLEX ORAL) Take by   
mouth. 0 2024   
Discontinued  
  
  
  
                                                                GLUC COLBERT/CHONDRO   
COLBERT A/VIT C/MN (GLUCOSAMINE 1500 COMPLEX ORAL) Take by mouth. 0  
   
Active  
  
  
  
                                        Comment on above:   Take by mouth.   
   
                                                    1 ml HYDROmorphone   
hydrochloride 1 mg/ml   
cartridge  
(2 sources)               Opioid Agonist            Start:   
20  
End:   
20                                                  HYDROmorphone   
(DILAUDID) injection   
0.5 mg  
   
                                                    lisinopril 5 mg oral   
tablet  
(20 sources)                            Angiotensin   
Converting Enzyme   
Inhibitor                               Start:   
20  
23  
End:   
20  
24                                      take 1 tablet   
by mouth once   
daily                                   lisinopril (ZESTRIL)   
5 mg tablet Take 1   
tablet by mouth once   
daily. 90 tablet 1   
2024   
Discontinued  
  
  
  
                                        Start: 2022   take 1 tablet by solitario  
th once   
daily                                   lisinopril (ZESTRIL, PRINIVIL) 10 mg   
tablet Take 1 tablet by mouth once   
daily. 90 tablet 0 2022 Active  
   
                                Start: 2022 take 2.5 mg by mouth once dorothea  
y 2.5 mg, Oral, DAILY, First   
dose on   
Mon 22 at 1345, Until   
Discontinued  
   
                                                    Start: 2021  
End: 2022                         take 0.5 tablet by mouth once   
daily                                   lisinopril (ZESTRIL, PRINIVIL) 5 mg   
tablet Take 0.5 tablets by mouth   
once daily. 45 tablet 3 09/10/2022   
2022 Discontinued  
   
                                                    Start: 2018  
End: 2022                                     lisinopril (PRINIVIL,ZESTRIL  
) 5 MG   
tablet  
   
                                                            take 1 tablet by solitario  
th once   
daily                                   lisinopril 2.5 MG tablet Take 2.5 mg   
by mouth daily. 0 Active  
  
  
  
                                        Comment on above:   Take 0.5 tablets by   
mouth once daily.   
   
                                                            Take 1 tablet by solitario  
th once daily.   
   
                                                    LORazepam 0.5 mg oral   
tablet  
(5 sources)               Benzodiazepine            Start:   
2024  
End:   
2024                                          LORazepam (ATIVAN) 0.5 mg   
Indications:   
Claustrophobia Take one   
tablet 20-30 minutes   
before your MRI and MRA.   
2 tablet 0 2024 Discontinued  
  
  
  
                                                    Start: 2022  
End: 2022                                     LORazepam (ATIVAN) 0.5 MG ta  
blet Indications: Spondylolisthesis  
   
of cervical region , Cervical radiculopathy Take 1 tablet by   
mouth See Admin Instructions for 30 days. Take 1 tablet one hour   
prior to MRI. May repeat a dose on arrival for MRI if needed 4   
tablet 0 2022 Active  
  
  
  
                                                    meloxicam 15 mg   
oral tablet  
(20 sources)                            Nonsteroidal   
Anti-inflammatory Drug                  Start:   
2022  
End: 2023                         take 1 tablet   
by mouth once   
daily at   
mealtime                                meloxicam (MOBIC)   
15 mg tablet   
Indications: Neck   
pain, chronic   
Take 1 tablet by   
mouth once daily.   
Take with food.   
30 tablet 2   
2022   
Discontinued  
   
                                                    Comment on   
above:                                  Take 1 tablet by mouth once daily. Take   
with food.   
   
                                                    Multiple   
Vitamin   
(Multi-Vitamin)   
tablet  
(1 source)                                            
End: 2023                                     Multiple Vitamin   
(Multi-Vitamin)   
tablet Take by   
mouth. 0   
2023   
Discontinued   
(Therapy   
completed)  
   
                                                    Multiple   
Vitamins-Minera  
ls (CENTRUM   
SILVER   
50+WOMEN) TABS  
(3 sources)                                                     Multiple   
Vitamins-Minerals   
(CENTRUM SILVER   
50+WOMEN) TABS   
Take by mouth 0   
Suspended  
  
  
  
                                                                Multiple Vitamin  
s-Minerals (CENTRUM SILVER 50+WOMEN) TABS Take by mouth 0   
Active  
  
  
  
                                                    multivitamin tablet  
(3 sources)                                           
End: 04-                         take 1 tablet by   
mouth once daily                        multivitamin tablet Take   
1 tablet by mouth once   
daily. 0 04/15/2022   
Discontinued  
  
  
  
                                                take 1 tablet by mouth once dorothea  
y multivitamin tablet Take 1 tablet by mouth   
once   
daily. 0 Active  
  
  
  
                                        Comment on above:   Take 1 tablet by solitario  
th once daily.   
   
                                                    potassium chloride   
10 meq extended   
release oral tablet  
(20 sources)                                        Start:   
01-  
End:   
2022                              take 1 tablet by   
mouth once daily at   
breakfast                               potassium chloride   
(K-TAB) 10 mEq tablet   
Indications:   
Hypokalemia Take 1   
tablet by mouth daily   
with breakfast. 0   
01/15/2020 2022   
Discontinued  
  
  
  
                                                                potassium chlori  
de, bulk, Powd Take 10 mEq by mouth. Active  
  
  
  
                                        Comment on above:   Take 1 tablet by solitario  
th daily with breakfast.   
   
                                                    rosuvastatin calcium   
5 mg oral tablet  
(20 sources)                            HMG-CoA Reductase   
Inhibitor                               Start:   
20  
End:   
20  
23                                      take 1 tablet by   
mouth once daily at   
bedtime                                 rosuvastatin   
(CRESTOR) 5 mg   
tablet Take 1   
tablet by mouth   
daily at bedtime.   
90 tablet 3   
2022   
Discontinued  
   
                                        Comment on above:   Take 1 tablet by solitario  
th daily at bedtime.   
   
                                                    5 ml sodium chloride   
9 mg/ml injection  
(6 sources)                                         Start:   
20                                      take 1 dose   
intravenously twice   
daily                                   5-40 mL,   
IntraVENous, EVERY   
12 HOURS SCHEDULED   
(2 times per day),   
First dose on 22 at 2100,   
Until Discontinued   
For Line Patency:   
Peripheral IV = 5   
mL; Midline or   
Central Line = 10   
mL/lumen.&amp;nbsp;  
&nbsp;If following   
IV push medication,   
administer flush at   
same rate as the IV   
push. Flush volume   
is determined by   
type of infusion   
therapy being   
given.&nbsp;&nbsp;F  
or non-viscous   
solutions   
use:&nbsp;Periphera  
l IV = 5   
mL&nbsp;Midline or   
Central Line = 10   
mL/lumen&nbsp;&nbsp  
;For viscous   
solutions (i.e.   
blood components,   
parenteral   
nutrition, contrast   
media, or after   
obtaining blood   
sample)   
use:&nbsp;Periphera  
l IV = 10   
mL&nbsp;Midline or   
Central Line = 20   
mL/lumen Post-op  
  
  
  
                                Start: 2022                 IntraVENous, a  
t 5-250 mL/hr, PRN,   
if patient receiving piggyback   
infusions and maintenance fluids   
are not ordered OR KVO fluids to   
protect IV site / prevent frequent   
line interruptions/ long duration,   
Starting on 22 at 1324   
For piggyback infusion, administer   
at same rate as piggyback for a   
total of 25 mL. Enter 25 mL into   
dose field and piggyback rate into   
rate field of order.&nbsp;If   
piggyback is infusing at a rate   
less than 100 mL/hr, enter 25 mL   
into dose field and 100 mL/hr into   
rate field of order.&nbsp;For KVO   
fluids, enter rate of 20 mL/hr or   
less into rate field of order.   
Post-op  
   
                                        Start: 2022   take 5-40 mL intrave  
nously once   
as needed                               5-40 mL, IntraVENous, PRN,   
Starting on 22 at 1324,   
Until Discontinued, Line Care,   
After every IV line use For Line   
Patency: Peripheral IV = 5 mL;   
Midline or Central Line = 10   
mL/lumen.&nbsp;&nbsp;If following   
IV push medication, administer   
flush at same rate as the IV push.   
Flush volume is determined by type   
of infusion therapy being   
given.&nbsp;&nbsp;For non-viscous   
solutions use:&nbsp;Peripheral IV   
= 5 mL&nbsp;Midline or Central   
Line = 10 mL/lumen&nbsp;&nbsp;For   
viscous solutions (i.e. blood   
components, parenteral nutrition,   
contrast media, or after obtaining   
blood sample) use:&nbsp;Peripheral   
IV = 10 mL&nbsp;Midline or Central   
Line = 20 mL/lumen Post-op  
  
  
  
                                                    Zinc Sulfate  
(20 sources)                                          
End: 2022                                     zinc sulfate (ZINC-15 ORAL)   
Take by mouth. 0   
2022 Discontinued  
  
  
  
                                                                zinc sulfate (ZI  
NC-15 ORAL) Take by mouth. 0 Active  
  
  
  
                                        Comment on above:   Take by mouth.   
  
  
  
Problems  
Active Problems  
  
  
                                                    Problem   
Classification  Problem         Date            Documented Date Episodic/Chronic  
   
                                                    Acute cerebrovascular   
disease  
(2 sources)                             Lacunar infarction;   
Translations: [Other   
cerebral infarction due   
to occlusion or   
stenosis of small   
artery]                                 Onset:   
09-  
4                         09-                Chronic  
   
                                                    Anxiety disorders  
(1 source)                              Claustrophobia;   
Translations:   
[Claustrophobia]                        2024          Chronic  
   
                                                    Asthma  
(20 sources)                            Mild intermittent   
asthma; Translations:   
[Mild intermittent   
asthma, uncomplicated]                  Onset:   
01-  
0                         01-                Chronic  
   
                                                    Cardiac dysrhythmias  
(1 source)                              Ventricular premature   
depolarization;   
Translations:   
[Ventricular premature   
depolarization]                         Onset:   
  
7                                                   Chronic  
   
                                                    Coronary   
atherosclerosis and   
other heart disease  
(20 sources)                            Coronary   
arteriosclerosis;   
Translations:   
[Atherosclerotic heart   
disease of native   
coronary artery without   
angina pectoris]                        Onset:   
  
8                         2018                Chronic  
   
                                                    Coronary   
atherosclerosis and   
other heart disease  
(1 source)                              Past history of   
procedure;   
Translations: [Coronary   
angioplasty status]                                         Episodic  
   
                                                    Coronary   
atherosclerosis and   
other heart disease  
(1 source)                              Coronary   
atherosclerosis and   
other heart disease                     Onset:   
  
7                                                     
   
                                                    Deficiency and other   
anemia  
(1 source)                              Hemoglobin low;   
Translations: [Anemia,   
unspecified]                                                Episodic  
   
                                                    Disorders of lipid   
metabolism  
(20 sources)                            Hyperlipidemia;   
Translations: [Mixed   
hyperlipidemia]                         Onset:   
  
2                         2018                Chronic  
   
                                                    E Codes: Fall  
(4 sources)                             Fall; Translations:   
[Unspecified fall,   
subsequent encounter]                                         Episodic  
   
                                                    Esophageal disorders  
(3 sources)                             Gastroesophageal reflux   
disease; Translations:   
[Gastro-esophageal   
reflux disease without   
esophagitis]                            Onset:   
  
2                                                   Chronic  
   
                                                    Essential   
hypertension  
(20 sources)                            Essential hypertension;   
Translations:   
[Essential (primary)   
hypertension]                           Onset:   
  
7                         2018                Chronic  
   
                                                    Essential   
hypertension  
(1 source)                Essential hypertension    Onset:   
  
7                                                     
   
                                                    Headache; including   
migraine  
(2 sources)                             Refractory migraine   
with aura;   
Translations: [Migraine   
with aura, intractable,   
without status   
migrainosus]                            Onset:   
09-  
4                         09-                Chronic  
   
                                                    Headache; including   
migraine  
(1 source)                              Headache; Translations:   
[Headaches]                             2023          Episodic  
   
                                                    Joint disorders and   
dislocations;   
trauma-related  
(1 source)                              Other internal   
derangements of   
unspecified knee;   
Translations: [Other   
internal derangements   
of unspecified knee]                    Onset:   
  
9                                                   Chronic  
   
                                                    Nutritional   
deficiencies  
(2 sources)                             Vitamin D deficiency;   
Translations: [Vitamin   
D deficiency,   
unspecified]                            Onset:   
  
3                         2023                Chronic  
   
                                                    Osteoarthritis  
(2 sources)                             Unilateral primary   
osteoarthritis, left   
knee; Translations:   
[Unilateral primary   
osteoarthritis, right   
knee]                                   Onset:   
  
9                                                   Chronic  
   
                                                    Other acquired   
deformities  
(1 source)                              Spondylolisthesis;   
Translations:   
[Spondylolisthesis,   
cervical region]                                            Episodic  
   
                                                    Other connective   
tissue disease  
(1 source)                              Pain in bilateral legs;   
Translations: [Pain in   
right leg]                                                  Episodic  
   
                                                    Other connective   
tissue disease  
(4 sources)                             History of cervical   
spine fusion;   
Translations:   
[Arthrodesis status]                                         Episodic  
   
                                                    Other connective   
tissue disease  
(2 sources)                             Pain in upper limb;   
Translations: [Arm   
Pain]                                   Onset:   
  
3                                                   Episodic  
   
                                                    Other connective   
tissue disease  
(1 source)                              Arthrodesis status;   
Translations: [S/P   
cervical spinal fusion]                 Onset:   
  
4                                                   Episodic  
   
                                                    Other ear and sense   
organ disorders  
(1 source)                              Chronic eczema of   
external auditory   
canal; Translations:   
[Other otitis externa,   
bilateral]                                                  Chronic  
   
                                                    Other ear and sense   
organ disorders  
(2 sources)                             Other otitis externa,   
bilateral;   
Translations: [Other   
otitis externa,   
bilateral]                              Onset:   
  
2                                                   Chronic  
   
                                                    Other   
gastrointestinal   
disorders  
(1 source)                              Chronic constipation;   
Translations: [Other   
constipation]                                               Episodic  
   
                                                    Other   
gastrointestinal   
disorders  
(5 sources)                             Dysphagia;   
Translations:   
[Dysphagia,   
pharyngoesophageal   
phase]                                                      Episodic  
   
                                                    Other   
gastrointestinal   
disorders  
(2 sources)                             Dysphagia, unspecified;   
Translations:   
[Dysphagia, unspecified   
type]                                   Onset:   
  
4                                                   Episodic  
   
                                                    Other injuries and   
conditions due to   
external causes  
(1 source)                              Unspecified injury of   
left lower leg, initial   
encounter;   
Translations:   
[Unspecified injury of   
left lower leg, initial   
encounter]                              Onset:   
  
9                                                   Episodic  
   
                                                    Other lower   
respiratory disease  
(2 sources)                             Dyspnea; Translations:   
[Shortness of breath]                     2024          Episodic  
   
                                                    Other lower   
respiratory disease  
(2 sources)                             Wheezing; Translations:   
[Wheezing]                              2024          Episodic  
   
                                                    Other lower   
respiratory disease  
(1 source)                              Wheezing; Translations:   
[Wheezing]                              Onset:   
09-  
4                                                   Episodic  
   
                                                    Other nervous system   
disorders  
(1 source)                              Difficulty in walking,   
not elsewhere   
classified;   
Translations:   
[Difficulty in walking,   
not elsewhere   
classified]                             Onset:   
  
9                                                   Chronic  
   
                                                    Other nervous system   
disorders  
(5 sources)                             Cervical myelopathy;   
Translations: [Disease   
of spinal cord,   
unspecified]                            Onset:   
  
2                                                   Chronic  
   
                                                    Other nervous system   
disorders  
(2 sources)                             Disturbance in speech;   
Translations: [Other   
speech disturbances]                     2024          Episodic  
   
                                                    Other nervous system   
disorders  
(1 source)                              Other speech   
disturbances;   
Translations: [Episode   
of change in speech]                    Onset:   
09-  
4                                                   Episodic  
   
                                                    Other non-traumatic   
joint disorders  
(1 source)                              Pain in left knee;   
Translations: [Pain in   
left knee]                              Onset:   
  
9                                                   Episodic  
   
                                                    Other non-traumatic   
joint disorders  
(1 source)                              Effusion, left knee;   
Translations:   
[Effusion, left knee]                   Onset:   
  
9                                                   Episodic  
   
                                                    Other non-traumatic   
joint disorders  
(1 source)                              Joint disorder,   
unspecified;   
Translations: [Joint   
disorder, unspecified]                  Onset:   
  
9                                                   Episodic  
   
                                                    Other non-traumatic   
joint disorders  
(1 source)                Knee pain                 Onset:   
  
9                                                   Episodic  
   
                                                    Other nutritional;   
endocrine; and   
metabolic disorders  
(20 sources)                            Obese class I;   
Translations: [Obesity,   
unspecified]                            Onset:   
  
2                                                   Chronic  
   
                                                    Other nutritional;   
endocrine; and   
metabolic disorders  
(1 source)                              Hypomagnesemia;   
Translations:   
[Hypomagnesemia]                        2023          Chronic  
   
                                                    Other nutritional;   
endocrine; and   
metabolic disorders  
(1 source)                              Hypercalcemia;   
Translations:   
[Hypercalcemia]                         2024          Chronic  
   
                                                    Other nutritional;   
endocrine; and   
metabolic disorders  
(1 source)                              Hypercalcemia;   
Translations:   
[Hypercalcemia]                         Onset:   
  
4                                                   Chronic  
   
                                                    Other nutritional;   
endocrine; and   
metabolic disorders  
(1 source)                              Hypomagnesemia;   
Translations:   
[Hypomagnesemia]                        Onset:   
  
3                                                   Chronic  
   
                                                    Other screening for   
suspected conditions   
(not mental disorders   
or infectious   
disease)  
(1 source)                              Patient encounter   
status; Translations:   
[Encounter for   
screening for malignant   
neoplasm of colon]                                          Episodic  
   
                                                    Other upper   
respiratory disease  
(1 source)                              Seasonal allergy;   
Translations: [Other   
seasonal allergic   
rhinitis]                               2024          Chronic  
   
                                                    Other upper   
respiratory disease  
(6 sources)                             Hoarse; Translations:   
[Dysphonia]                                                 Episodic  
   
                                                    Other upper   
respiratory disease  
(3 sources)                             Dysphonia;   
Translations:   
[Dysphonia]                             Onset:   
  
2                                                   Episodic  
   
                                                    Residual codes;   
unclassified  
(2 sources)                             H/O Spinal surgery;   
Translations: [Other   
specified   
postprocedural states]                                         Episodic  
   
                                                    Residual codes;   
unclassified  
(2 sources)                             Other specified   
postprocedural states;   
Translations: [Other   
specified   
postprocedural states]                  Onset:   
  
3                                                   Episodic  
   
                                                    Spondylosis;   
intervertebral disc   
disorders; other back   
problems  
(1 source)                              Cervical spondylosis;   
Translations:   
[Spondylosis without   
myelopathy or   
radiculopathy, cervical   
region]                                                     Chronic  
   
                                                    Spondylosis;   
intervertebral disc   
disorders; other back   
problems  
(10 sources)                            Cervical radiculopathy;   
Translations:   
[Radiculopathy,   
cervical region]                        Onset:   
  
3                                                   Episodic  
   
                                                    Sprains and strains  
(1 source)                              Sprain of lateral   
collateral ligament of   
left knee, initial   
encounter;   
Translations: [Sprain   
of lateral collateral   
ligament of left knee,   
initial encounter]                      Onset:   
  
9                                                   Episodic  
   
                                                    Transient cerebral   
ischemia  
(20 sources)                            Transient cerebral   
ischemia; Translations:   
[Transient cerebral   
ischemic attack,   
unspecified]                            Onset:   
01-  
0                         01-                Chronic  
   
                                                    Unclassified  
(1 source)                              Atherosclerotic heart   
disease of native   
coronary artery with   
other forms of angina   
pectoris /   
I25.118(ICD-10)                         Onset:   
  
7                                                     
   
                                                    Unclassified  
(1 source)                              Ventricular premature   
depolarization /   
I49.3(ICD-10)                           Onset:   
  
7                                                     
   
                                                    Unclassified  
(2 sources)               3 MO FU EARS              Onset:   
  
3                                                     
   
                                                    Unclassified  
(1 source)                              Headaches;   
Translations:   
[Headaches]                             Onset:   
  
3                                                     
  
  
Past or Other Problems  
  
  
                                                    Problem   
Classification  Problem         Date            Documented Date Episodic/Chronic  
   
                                                    Cardiac dysrhythmias  
(20 sources)                            Palpitations;   
Translations:   
[Palpitations]                          Onset:   
2022                                          Episodic  
   
                                                    Complication of   
device; implant or   
graft  
(4 sources)                             Dislocated intraocular   
lens; Translations:   
[Displacement of   
intraocular lens,   
initial encounter]                      Onset:   
2018                Episodic  
   
                                                    Diabetes mellitus   
without complication  
(2 sources)                             Impaired fasting   
glycemia; Translations:   
[Impaired fasting   
glucose]                                Onset:   
2023                Episodic  
   
                                                    Disorders of teeth   
and jaw  
(3 sources)                             Bilateral   
temporomandibular joint   
pain; Translations:   
[Arthralgia of   
bilateral   
temporomandibular   
joint]                                  Onset:   
2022                                          Episodic  
   
                                                    Fluid and   
electrolyte   
disorders  
(20 sources)                            Hypokalemia;   
Translations:   
[Hypokalemia]                           Onset:   
01-                01-                Episodic  
   
                                                    Other injuries and   
conditions due to   
external causes  
(20 sources)                            Injury of right   
shoulder; Translations:   
[Unspecified injury of   
right shoulder and   
upper arm, subsequent   
encounter]                              Onset:   
2022                                          Episodic  
   
                                                    Other lower   
respiratory disease  
(3 sources)                             Chronic cough;   
Translations: [Chronic   
cough]                                  Onset:   
2024                Episodic  
   
                                                    Other lower   
respiratory disease  
(1 source)                              Shortness of breath;   
Translations: [SOB   
(shortness of breath)]                  Onset:   
2024                                          Episodic  
   
                                                    Residual codes;   
unclassified  
(20 sources)                            Postmenopausal state;   
Translations:   
[Asymptomatic   
menopausal state]                       Onset:   
01-                01-                Episodic  
   
                                                    Unclassified  
(1 source)                              Atherosclerotic heart   
disease of native   
coronary artery with   
other forms of angina   
pectoris; Translations:   
[Atherosclerotic heart   
disease of native   
coronary artery with   
other forms of angina   
pectoris]                               Onset:   
2017                                            
  
  
  
Results  
  
  
                          Test Name    Value        Interpretation Reference   
Range                                   Facility  
   
                                                    [facility] 09-   
   
                                        CNOV                Office Visit ([facility]  
)  
------------------------  
--------  
ELMIRA WARNER   
([id number]) 1945 F  
Date Time Provider   
Department  
9/10/24 2:45 PM LIZETTE BROWNE  
During your visit today,   
we recorded the   
following information   
about you:  
Pulse Respiration Blood   
pressure Weight  
82/minute 18/minute   
158/76 77.8 kg  
Lizette Browne PA-C   
9/10/2024 3:50 PM Signed  
ESTABLISHED PATIENT   
VISIT  
Last visit: 24 with   
Dr. Sepulveda  
ASSESSMENT/PLAN:  
1. Hoarseness - ICD9:   
784.42, ICD10: R49.0   
(primary diagnosis)  
2. Dysphagia,   
unspecified type - ICD9:   
787.20, ICD10: R13.10  
3. Swallowing disorder -   
ICD9: 787.20, ICD10:   
R13.10  
4. S/P cervical spinal   
fusion - ICD9: V45.4,   
ICD10: Z98.1  
5. Wheezing [R06.2] -   
ICD9: 786.07, ICD10:   
R06.2  
6. Episode of change in   
speech [R47.89] - ICD9:   
784.59, ICD10: R47.89  
Patient with dysphagia,   
cough, change in voice;   
with symptoms appearing   
to have  
started following C   
spine fusion as above.   
Since that time, she has   
followed  
with spine surgeons as   
well as PCP and ENT.   
Endoscopy unremarkable   
per report.  
While dysphagia can   
occur as a complication   
following anterior C   
spine  
surgery, in most cases   
it is mild and improves   
over time -- pt feels   
her  
symptoms are worsening.   
Thus, need to consider   
other possible   
etiologies.  
With no structural   
abnormality on ENT   
evaluation, need to   
consider following in  
ddx:  
-Possible posterior   
fossa stroke with   
patient also reporting   
history of other  
stroke like symptoms and   
stroke risk factors as   
above. To further   
evaluate  
will get MRI brain with   
MRA head and neck.  
-Possible neuromuscular   
disorder such as MG.   
Could not provoke   
impairment in  
EOM or ptosis during   
visit. Does report   
fatigue and worsening   
function in warm  
temperatures. Will get   
following labs to   
assess:  
- ACETYLCHOLINE REC   
BINDING AB  
- ACETYLCHOLINE RECEPTOR   
MODULATING ANTIBODY  
- ACETYLCHOLINE REC   
BLOCKING AB  
-Patient is on   
Lisinopril which could   
cause cough and   
angioedema. Will ask PCP  
if Lisinopril could be   
held to see if any   
improvement of symptoms.  
-Refer to speech therapy   
for swallow evaluation   
including imaging   
studies if  
they feel appropriate   
(no prior MBS).  
-Symptoms could still in   
fact be secondary to   
surgery, but feel need   
to rule  
out other possible   
causes as above.  
Eugene Sepulveda MD  
CHIEF COMPLAINT: follow   
up  
HISTORY OF PRESENT   
ILLNESS: Elmira Warner is a 79 year   
old female, BMI  
30.4 kg/m2 with a PMH   
significant for   
lipidemia, CAD with MI,   
hypertension,  
asthma, TIA, obesity,   
cervical surgery.  
Having trouble   
swallowing, last seen on   
24 by Dr. Sepulveda.   
Started after C  
spine surgery, coughing.   
Worsening over time,   
concern for   
stroke.Concerning for  
MG as well. Referred to   
speech therapy for   
swallowing eval. MRI   
showing  
multiple remote   
infarcts, MRA head and   
neck with no large   
vessel occlusion. Had  
recent ECHO. Labs   
normal.  
Patient presents for   
follow-up appointment   
today. Notes that he   
still having  
issues with swallowing,   
fluctuates but does   
occasionally choke on   
hard foods.  
Does not seem to choke   
on liquids. Did not   
follow with speech   
therapy because  
she does not feel she   
needs help with her   
speech. Was unaware they   
could do  
swallowing evaluation.   
No other changes since   
previous appointment.  
Patient's primary   
concern today is   
headaches. Notes that   
for the last few  
weeks has been having   
regular headaches, prior   
to this she is only   
having about  
1 a month. Has   
longstanding history of   
migraines since she was   
a young  
teenager. Notes that she   
has not been drinking   
much water, attributes   
most of  
the headaches secondary   
to the weather changing.   
Notes that she had a lot  
headaches before surgery   
on her neck but feels   
they may have worsened  
afterwards. Will   
occasionally get sharp   
shooting pains from the   
back to the  
back of the ear on the   
left side, seem to be   
triggered by more   
physical  
activity. Notes that she   
has not had 1 of these   
in many days because she   
has  
not been as physically   
active outside. Also   
gets more pressure type   
headaches  
with associated   
photophobia and   
dizziness. These can   
last all day, typically  
she takes caffeine and   
goes and lays down which   
seems to break the   
headache.  
Does not like to take   
medication for these. Is   
currently on magnesium   
and  
co-Q10. These   
pressure-like headaches   
and to be triggered by   
weather changes  
as well as certain   
smells. Does have   
significant fatigue   
associate with them  
as well. Will   
occasionally get zigzags   
in her vision that last   
for about 10  
minutes when these   
occur. Again, have these   
more frequently the last   
few weeks.  
REVIEW OF SYSTEMS  
GENERAL:No weight loss,   
malaise or fevers.  
HEENT:Negative for   
frequent or significant   
headaches, No changes in   
hearing or  
vision, no nose bleeds   
or other nasal problems  
NECK:Negative for lumps,   
goiter, pain and   
significant neck   
swelling  
RESPIRATORY: Negative   
for cough, wheezing or   
shortness of breath.  
CARDIOVASCULAR: (more   
content not included)... Normal                                  [facility]  
   
                                                    CNOVon 2024   
   
                                        CNOV                Office Visit (FAMPWS  
)  
------------------------  
--------  
ELMIRA WARNER   
([id number]) 1945 F  
Date Time Provider   
Department  
24 10:00 AM   
MARIA E GUZMÁN FAMPWS  
During your visit today,   
we recorded the   
following information   
about you:  
Pulse Respiration Blood   
pressure Weight  
73/minute 20/minute   
162/84 77.7 kg  
Maria E Guzmán APRN.CNP 2024 2:09   
PM Signed  
Chief Complaint  
Patient presents with:  
BP Check: Puffy under   
eyes x3 weeks  
HPI  
Elmira Warner is a   
79 year old female who   
presents here today for   
Above  
Complaints..  
Per visit with Yanet Shen CNP on   
2024:  
Concerns today..  
BP check --  
Was taken off of   
lisinopril by Dr. Sepulveda   
on  d/t concern that   
cough and  
hoarseness were caused   
by this. Pt reports   
cough and hoarseness   
have completely  
resolved since having   
lisinopril on hold x 2   
weeks now.  
Pt has been taking BP at   
home daily and normally   
BP around 138-145/90s   
without  
medication regimen.  
Pt has been eating out   
more than usual the past   
few days and has noticed   
BP  
slowly rising based on   
what she eats.  
Feels she needs to be on   
something for BP to help   
her heart.  
She denies chest pain,   
shortness of breath,   
palpitations, dizziness,   
leg edema,  
headaches, or vision   
changes.  
ASSESSMENT/PLAN:  
1. Essential   
hypertension - ICD9:   
401.9, ICD10: I10  
- Worsening control  
- Stop/discontinue   
lisinopril. Switch to   
losartan 25 mg daily.  
- Recommend home blood   
pressure monitoring, to   
bring results to next   
visit  
- Encouraged sodium   
restriction, DASH or   
Mediterranean diet  
- Recommend regular   
aerobic exercise  
- Follow up in 4 weeks   
for hypertension visit  
- LOSARTAN 25 MG TABLET  
Yanet Sanchez APRN.CNP  
Currently:  
Going through a lot of   
stress this morning,   
feels this is the reason   
her BP is  
elevated.  
When working outside   
150/100. Seems to be   
typically   
140's/'s. Has a   
lot  
of work to do at her   
house, both inside and   
outside. Chest gets warm   
and some  
sweating at nighttime.   
Denies CP or   
palpitations, no edema   
or h/a. Today  
right neck and down   
right arm is   
painful-this is after   
doing edging in her yard  
yesterday and is a   
muscular pain.  
Past medical history,   
appointments,   
medications, allergies   
reviewed.  
Previous Medical History  
PAST MEDICAL HISTORY  
No date: Abdominal pain,   
right lower quadrant  
No date: Abdominal pain,   
right upper quadrant  
No date: Cerebrovascular   
disease, unspecified  
No date: Coronary   
atherosclerosis of   
unspecified type of   
vessel,  
native or graft  
No date: Diverticulosis   
of colon (without   
mention of hemorrhage)  
No date: Essential   
hypertension, benign  
No date: Intrinsic   
asthma with status   
asthmaticus  
No date: Mixed   
hyperlipidemia  
No date: Osteoporosis,   
unspecified  
No date: Thoracic or   
lumbosacral neuritis or   
radiculitis, unspecified  
Previous Surgical   
History  
PAST SURGICAL HISTORY  
times 3: ANGIOPLASTY  
Comment: 7 stents  
No date: APPENDECTOMY  
12 years ago:   
COLONOSCOPY FLX DX   
W/COLLJ SPEC WHEN PFRMD  
Comment: Colonoscopy  
2012: COLONOSCOPY   
FLX DX W/COLLJ SPEC WHEN   
PFRMD  
Comment: Colonoscopy  
No date: HYSTERECTOMY HX  
: PAST SURGICAL   
HISTORY OF  
Comment: cervical spinal   
fusion  
Family History  
FAMILY HISTORY  
Problem Relation Age of   
Onset  
Stroke Father 52  
heart dx,osteoarthritis  
other (rheumatic fever)   
Sister  
other (rheumatic fever)   
Sister  
other (heart disease)   
Sister  
other (heart disease)   
Brother  
Patient Allergies  
ALLERGIES  
Allergen Reactions  
Adhesive Tape-Silic*   
Rash  
Demerol [Meperidine*   
Vomiting  
migraine  
Influenza Vaccine T*   
Diarrhea  
vomiting/fever  
Iodine Other: See   
Comments  
sick /headaches  
Penicillins Hives  
throat and mouth   
swelling  
Shellfish Hives  
Current Medications  
Current Outpatient   
Medications on File   
Prior to Visit  
Medication Sig  
losartan (COZAAR) 25 mg   
tablet Take 1 tablet by   
mouth once daily.  
albuterol (PROVENTIL)   
2.5 mg /3 mL (0.083 %)   
nebulizer solution Use 3   
mL via  
nebulizer every 6 hours   
as needed.  
nitroglycerin sublingual   
(NITROSTAT) 0.4 mg SL   
tablet Dissolve 1 tablet   
under  
the tongue every 5   
minutes as needed for   
chest pain. Max of 3   
doses. If no  
relief, call 911.  
folic acid 0.8 mg cap   
Take 300 mg by mouth   
once daily.  
vit B complex-C-folic   
ac-zinc 800 mcg- 12.5 mg   
tab Take by mouth.  
levalbuterol tartrate   
HFA (XOPENEX HFA) 45   
mcg/actuation inhaler   
Inhale 2  
Puffs as instructed   
every 6 hours as needed.  
vit B complex   
no.12/niacin,B3,   
(VITAMIN B COMPLEX   
NO.12-NIACIN ORAL) Take   
by  
mouth.  
TUMERIC-GING-OLIVE-OREG-  
CAPRYL ORAL Take by   
mouth.  
Bisacodyl (DULCOLAX) 5   
mg tab Use as directed   
for Miralax / Gatorade   
Bowel  
Prep Kit  
Nebulizers (AERONEB GO   
NEBULIZER) 1 Each as   
directed. Portable   
Nebulizer with  
tubing, Dx: mild   
persistent asthma  
MULTIVITAMIN (VITAMIN A   
DAY ORAL) Take by mouth.  
Cholecalciferol, Vitamin   
D3, 25 mcg (1,000 unit)   
cap Take 1,000 Units by   
mouth  
once daily.  
Ascorbic Acid 1,000 mg   
tablet Take 1 (more   
content not included)... Normal                                  [facility] 2024   
   
                                        CNPN                Telephone (DILLON)  
------------------------  
--------  
ELMIRA WARNER   
([id number]) 1945 F  
Date Time Provider   
Department  
24 EUGENE SEPULVEDA JR  
During your visit today,   
we recorded the   
following information   
about you:  
SHELLIE Guzmán RN   
2024 9:05 AM Signed  
Patient phoned asking   
Dr. Sepulveda to please   
review and advise on MRI   
/ MRA brain,  
AND MRA carotid results.  
Eugene Sepulveda Jr., MD   
2024 12:04 PM   
Signed  
Per rad report, there   
are not findings to   
suggest a definite   
intracranial cause  
of symptoms.  
MD Dorina Prabhakar Jessica, LPN   
2024 12:58 PM   
Signed  
TC to pt who voiced   
understanding and appt   
made to review results.  
Jolene Gupta LPN  
Allergies As of Date:   
2024 Noted Allergy   
Reaction  
ADHESIVE TAPE-SILICONES   
01/15/2020 2 - Rash  
DEMEROL (MEPERIDINE   
(PF)) 2012 11 -   
Vomiting  
Comments: migraine  
INFLUENZA VACCINE TRI-SP   
-10 2012 6 -   
Diarrhea  
Comments: vomiting/fever  
IODINE 2012 14 -   
Other: See Comments  
Comments: sick   
/headaches  
PENICILLINS 2012 4   
- Hives  
Comments: throat and   
mouth swelling  
SHELLFISH 2012 4 -   
Hives  
Date Reviewed:   
2024  
Reviewed by: Xochilt Azul APRN.CNP -   
Fully Assessed  
Reason for Visit:  
Results [95]  
Prescriptions as of   
2024  
- LORazepam (ATIVAN) 0.5   
mg  
Take one tablet 20-30   
minutes before your MRI   
and MRA.  
- losartan (COZAAR) 25   
mg tablet  
Take 1 tablet by mouth   
once daily.  
- albuterol (PROVENTIL)   
2.5 mg /3 mL (0.083 %)   
nebulizer solution  
Use 3 mL via nebulizer   
every 6 hours as needed.  
- nitroglycerin   
sublingual (NITROSTAT)   
0.4 mg SL tablet  
Dissolve 1 tablet under   
the tongue every 5   
minutes as needed for   
chest pain.  
Max of 3 doses. If no   
relief, call 911.  
- folic acid 0.8 mg cap  
Take 300 mg by mouth   
once daily.  
- vit B complex-C-folic   
ac-zinc 800 mcg- 12.5 mg   
tab  
Take by mouth.  
- levalbuterol tartrate   
HFA (XOPENEX HFA) 45   
mcg/actuation inhaler  
Inhale 2 Puffs as   
instructed every 6 hours   
as needed.  
- vit B complex   
no.12/niacin,B3,   
(VITAMIN B COMPLEX   
NO.12-NIACIN ORAL)  
Take by mouth.  
-   
TUMERIC-GING-OLIVE-OREG-  
CAPRYL ORAL  
Take by mouth.  
- Bisacodyl (DULCOLAX) 5   
mg tab  
Use as directed for   
Miralax / Gatorade Bowel   
Prep Kit  
- Nebulizers (AERONEB GO   
NEBULIZER)  
1 Each as directed.   
Portable Nebulizer with   
tubing, Dx: mild   
persistent asthma  
- Lactobac   
no.41/Bifidobact no.7   
(PROBIOTIC-10 ORAL)  
Take by mouth.  
- MULTIVITAMIN (VITAMIN   
A DAY ORAL)  
Take by mouth.  
- Cholecalciferol,   
Vitamin D3, 25 mcg   
(1,000 unit) cap  
Take 1,000 Units by   
mouth once daily.  
- Ascorbic Acid 1,000 mg   
tablet  
Take 1,000 mg by mouth   
once daily.  
- Aspirin 81 mg Tab  
Take 162 mg by mouth   
once daily.  
- GLUC COLBERT/CHONDRO COLBERT   
A/VIT C/MN (GLUCOSAMINE   
1500 COMPLEX ORAL)  
Take by mouth.  
- CALCIUM   
CARBONATE/VITAMIN D3   
(CALCIUM + D ORAL)  
Take by mouth.  
Problem List As Of Date   
2024 Noted   
Resolved  
Hyperlipidemia, mixed   
[E78.2] 01/15/2020  
Coronary artery disease   
involving native heart   
*01/15/2020  
Hypokalemia [E87.6]   
01/15/2020  
Asymptomatic   
postmenopausal status   
[Z78.0] 01/15/2020  
TIA (transient ischemic   
attack) [G45.9]   
01/15/2020  
Mild intermittent   
asthma, uncomplicated   
[J45.20]01/15/2020  
Essential hypertension   
[I10] 01/15/2020  
Palpitations [R00.2]   
2022  
Right shoulder injury,   
subsequent encounter   
[S4*2022  
Obesity, Class I, BMI   
30-34.9 [E66.9]   
2022  
Encounter Number:   
[id number]  
Encounter Status:Closed   
by JOLENE GUPTA on   
24             Normal                                  [facility]  
   
                                                    MR Brain WO contraston    
   
                                                            * * *Final Report* *  
 *  
  
DATE OF EXAM: Aug 6 2024   
8:35AM  
  
[facility] 0294 - MRI BRAIN WO   
IVCON / ACCESSION #   
[id number]  
  
PROCEDURE REASON:   
multiple diagnoses  
  
* * * * Physician   
Interpretation * * * *  
  
EXAMINATION: MRI BRAIN   
WO IVCON, MRA BRAIN WO   
IVCON, MRA CAROTID WO  
IVCON  
  
HISTORY: Hoarseness   
Dysphagia, unspecified   
type Swallowing disorder   
S/P  
cervical spinal fusion.  
  
TECHNIQUE: MRI of the   
brain without contrast.   
MRA of the brain and  
carotid arteries without   
contrast. Intracranial   
3D time-of-flight MRA  
with post-processing   
performed at the   
modality and 2D   
multiplanar and 3D  
maximum intensity   
projections were   
created, reviewed and   
archived.  
MQ: [id number]  
  
COMPARISON: None.  
  
  
RESULT:  
  
BRAIN:  
  
Acute Change: No acute   
infarct.  
  
Hemorrhage: Punctate   
magnetic susceptibility   
within the anterior LEFT  
frontal lobe. Additional   
similar finding within   
the anterior LEFT  
temporal lobe and RIGHT   
cerebellar hemisphere.  
  
Mass Lesion/ Mass   
Effect: No evidence of   
an intracranial mass or  
extra-axial fluid   
collection. No   
significant mass effect.  
  
Chronic Change:   
Scattered patchy and   
confluent areas of   
increased T2 and  
FLAIR signal are present   
in the supratentorial   
white matter which is  
nonspecific but likely   
represents chronic   
microvascular ischemia.   
Small  
remote infarcts in the   
cerebellar hemispheres   
bilaterally. Remote  
lacunar infarcts and/or   
dilated perivascular   
spaces in the basal   
ganglia  
bilaterally.  
  
Parenchyma: There is   
moderate generalized   
parenchymal volume loss.   
The  
brain parenchyma is   
otherwise within normal   
limits of signal   
intensity  
and morphology.  
  
Ventricles:   
Ventriculomegaly   
corresponds to the   
degree of parenchymal  
volume loss.  
  
Skull Base: Hypothalamic   
and pituitary region are   
grossly normal.  
Craniocervical junction   
is normal. No   
significant marrow   
replacement  
process.  
  
Vasculature: Major   
intracranial arterial   
structures, and dural   
venous  
sinuses show typical   
flow void, suggesting   
patency by spin echo   
criteria.  
  
Other: Postoperative   
changes involve the   
globes bilaterally.  
  
  
  
MRA neck/carotid   
arteries: Suboptimal   
secondary to patient   
motion.  
  
Carotid Stenosis:  
Right Common: No   
significant stenosis.  
Right Internal Carotid   
Plaque: Obscured  
Right Internal Carotid   
Stenosis (% by NASCET   
Criteria): Obscured  
  
Left Common: No   
significant stenosis.  
Left Internal Carotid   
Plaque: No significant   
plaque formation.  
Left Internal Carotid   
Stenosis (% by NASCET   
Criteria): Less than 30  
  
Cervical Vertebral   
Arteries:  
Patency:  Bilateral  
Dominance: Codominant  
  
  
INTRACRANIAL MRA:  
  
The petrous, cavernous,   
and supraclinoid   
internal carotid   
arteries are  
patent. Anterior   
cerebral arteries and   
middle cerebral arteries   
are  
patent. Intracranial   
vertebral arteries,   
basilar artery, and   
posterior  
cerebral arteries are   
patent. No vessel   
cutoffs or aneurysms are  
identified. Fetal origin   
of the LEFT posterior   
cerebral artery.  
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   
of the RIGHT posterior   
communicating artery   
which  
is markedly delayed.  
  
  
  
                                                            DIVISION OF   
RADIOLOGY  
   
                                                            Provider, [facility] - 2024   
Formatting of this note   
might be different from   
the original.  
* * *Final Report* * *  
  
DATE OF EXAM: Aug 6 2024   
8:35AM  
  
[facility] 0294 - MRI BRAIN WO   
IVCON / ACCESSION #   
[id number]  
  
PROCEDURE REASON:   
multiple diagnoses  
  
* * * * Physician   
Interpretation * * * *  
  
EXAMINATION: MRI BRAIN   
WO IVCON, MRA BRAIN WO   
IVCON, MRA CAROTID WO  
IVCON  
  
HISTORY: Hoarseness   
Dysphagia, unspecified   
type Swallowing disorder   
S/P  
cervical spinal fusion.  
  
TECHNIQUE: MRI of the   
brain without contrast.   
MRA of the brain and  
carotid arteries without   
contrast. Intracranial   
3D time-of-flight MRA  
with post-processing   
performed at the   
modality and 2D   
multiplanar and 3D  
maximum intensity   
projections were   
created, reviewed and   
archived.  
MQ: MRAB_3  
  
COMPARISON: None.  
  
  
RESULT:  
  
BRAIN:  
  
Acute Change: No acute   
infarct.  
  
Hemorrhage: Punctate   
magnetic susceptibility   
within the anterior LEFT  
frontal lobe. Additional   
similar finding within   
the anterior LEFT  
temporal lobe and RIGHT   
cerebellar hemisphere.  
  
Mass Lesion/ Mass   
Effect: No evidence of   
an intracranial mass or  
extra-axial fluid   
collection. No   
significant mass effect.  
  
Chronic Change:   
Scattered patchy and   
confluent areas of   
increased T2 and  
FLAIR signal are present   
in the supratentorial   
white matter which is  
nonspecific but likely   
represents chronic   
microvascular ischemia.   
Small  
remote infarcts in the   
cerebellar hemispheres   
bilaterally. Remote  
lacunar infarcts and/or   
dilated perivascular   
spaces in the basal   
ganglia  
bilaterally.  
  
Parenchyma: There is   
moderate generalized   
parenchymal volume loss.   
The  
brain parenchyma is   
otherwise within normal   
limits of signal   
intensity  
and morphology.  
  
Ventricles:   
Ventriculomegaly   
corresponds to the   
degree of parenchymal  
volume loss.  
  
Skull Base: Hypothalamic   
and pituitary region are   
grossly normal.  
Craniocervical junction   
is normal. No   
significant marrow   
replacement  
process.  
  
Vasculature: Major   
intracranial arterial   
structures, and dural   
venous  
sinuses show typical   
flow void, suggesting   
patency by spin echo   
criteria.  
  
Other: Postoperative   
changes involve the   
globes bilaterally.  
  
  
  
MRA neck/carotid   
arteries: Suboptimal   
secondary to patient   
motion.  
  
Carotid Stenosis:  
Right Common: No   
significant stenosis.  
Right Internal Carotid   
Plaque: Obscured  
Right Internal Carotid   
Stenosis (% by NASCET   
Criteria): Obscured  
  
Left Common: No   
significant stenosis.  
Left Internal Carotid   
Plaque: No significant   
plaque formation.  
Left Internal Carotid   
Stenosis (% by NASCET   
Criteria): Less than 30  
  
Cervical Vertebral   
Arteries:  
Patency: Bilateral  
Dominance: Codominant  
  
  
INTRACRANIAL MRA:  
  
The petrous, cavernous,   
and supraclinoid   
internal carotid   
arteries are  
patent. Anterior   
cerebral arteries and   
middle cerebral arteries   
are  
patent. Intracranial   
vertebral arteries,   
basilar artery, and   
posterior  
cerebral arteries are   
patent. No vessel   
cutoffs or aneurysms are  
identified. Fetal origin   
of the LEFT posterior   
cerebral artery.  
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   
of the RIGHT posterior   
communicating artery   
which  
is markedly delayed.  
  
  
  
  
IMPRESSION  
IMPRESSION:  
  
No acute findings.   
Chronic changes.   
Multiple remote   
infarcts.  
  
MRA imaging of the head   
and neck demonstrates no   
large vessel occlusion  
or high-grade stenosis.  
  
  
  
  
: GRICEL  
Transcribe Date/Time:   
Aug 6 2024 9:07A  
  
Dictated by : ARIANNA PARDO MD  
  
This examination was   
interpreted and the   
report reviewed and  
electronically signed   
by:  
ARIANNA PARDO MD on Aug 6   
2024 9:26AM [facility]  
   
                                                    MRA BRAIN WO IVCONon   
024   
   
                                        MRA BRAIN WO IVCON  * * *Final Report* *  
 *  
DATE OF EXAM: Aug 6 2024   
8:35AM  
[facility] 0272 - MRA BRAIN WO   
IVCON / ACCESSION #   
[id number]  
PROCEDURE REASON:   
multiple diagnoses  
  
* * * * Physician   
Interpretation * * * *  
EXAMINATION: MRI BRAIN   
WO IVCON, MRA BRAIN WO   
IVCON, MRA CAROTID WO  
IVCON  
HISTORY: Hoarseness   
Dysphagia, unspecified   
type Swallowing disorder   
S/P  
cervical spinal fusion.  
TECHNIQUE: MRI of the   
brain without contrast.   
MRA of the brain and  
carotid arteries without   
contrast. Intracranial   
3D time-of-flight MRA  
with post-processing   
performed at the   
modality and 2D   
multiplanar and 3D  
maximum intensity   
projections were   
created, reviewed and   
archived.  
MQ: MRAB_3  
COMPARISON: None.  
RESULT:  
BRAIN:  
Acute Change: No acute   
infarct.  
Hemorrhage: Punctate   
magnetic susceptibility   
within the anterior LEFT  
frontal lobe. Additional   
similar finding within   
the anterior LEFT  
temporal lobe and RIGHT   
cerebellar hemisphere.  
Mass Lesion/ Mass   
Effect: No evidence of   
an intracranial mass or  
extra-axial fluid   
collection. No   
significant mass effect.  
Chronic Change:   
Scattered patchy and   
confluent areas of   
increased T2 and  
FLAIR signal are present   
in the supratentorial   
white matter which is  
nonspecific but likely   
represents chronic   
microvascular ischemia.   
Small  
remote infarcts in the   
cerebellar hemispheres   
bilaterally. Remote  
lacunar infarcts and/or   
dilated perivascular   
spaces in the basal   
ganglia  
bilaterally.  
Parenchyma: There is   
moderate generalized   
parenchymal volume loss.   
The  
brain parenchyma is   
otherwise within normal   
limits of signal   
intensity  
and morphology.  
Ventricles:   
Ventriculomegaly   
corresponds to the   
degree of parenchymal  
volume loss.  
Skull Base: Hypothalamic   
and pituitary region are   
grossly normal.  
Craniocervical junction   
is normal. No   
significant marrow   
replacement  
process.  
Vasculature: Major   
intracranial arterial   
structures, and dural   
venous  
sinuses show typical   
flow void, suggesting   
patency by spin echo   
criteria.  
Other: Postoperative   
changes involve the   
globes bilaterally.  
MRA neck/carotid   
arteries: Suboptimal   
secondary to patient   
motion.  
Carotid Stenosis:  
Right Common: No   
significant stenosis.  
Right Internal Carotid   
Plaque: Obscured  
Right Internal Carotid   
Stenosis (% by NASCET   
Criteria): Obscured  
Left Common: No   
significant stenosis.  
Left Internal Carotid   
Plaque: No significant   
plaque formation.  
Left Internal Carotid   
Stenosis (% by NASCET   
Criteria): Less than 30  
Cervical Vertebral   
Arteries:  
Patency: Bilateral  
Dominance: Codominant  
INTRACRANIAL MRA:  
The petrous, cavernous,   
and supraclinoid   
internal carotid   
arteries are  
patent. Anterior   
cerebral arteries and   
middle cerebral arteries   
are  
patent. Intracranial   
vertebral arteries,   
basilar artery, and   
posterior  
cerebral arteries are   
patent. No vessel   
cutoffs or aneurysms are  
identified. Fetal origin   
of the LEFT posterior   
cerebral artery.  
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   
of the RIGHT posterior   
communicating artery   
which  
is markedly delayed.  
IMPRESSION:  
No acute findings.   
Chronic changes.   
Multiple remote   
infarcts.  
MRA imaging of the head   
and neck demonstrates no   
large vessel occlusion  
or high-grade stenosis.  
: GRICEL  
Transcribe Date/Time:   
Aug 6 2024 9:07A  
Dictated by : ARIANNA PARDO MD  
This examination was   
interpreted and the   
report reviewed and  
electronically signed   
by:  
ARIANNA PARDO MD on Aug 6   
2024 9:26AM EST  
[id number] Normal                                  [facility]  
   
                                                    MRA CAROTID WO IVCONon    
   
                                        MRA CAROTID WO IVCON * * *Final Report*   
* *  
DATE OF EXAM: Aug 6 2024   
8:35AM  
[facility] 0275 - MRA CAROTID   
WO IVCON / ACCESSION #   
[id number]  
PROCEDURE REASON:   
multiple diagnoses  
  
* * * * Physician   
Interpretation * * * *  
EXAMINATION: MRI BRAIN   
WO IVCON, MRA BRAIN WO   
IVCON, MRA CAROTID WO  
IVCON  
HISTORY: Hoarseness   
Dysphagia, unspecified   
type Swallowing disorder   
S/P  
cervical spinal fusion.  
TECHNIQUE: MRI of the   
brain without contrast.   
MRA of the brain and  
carotid arteries without   
contrast. Intracranial   
3D time-of-flight MRA  
with post-processing   
performed at the   
modality and 2D   
multiplanar and 3D  
maximum intensity   
projections were   
created, reviewed and   
archived.  
MQ: MRAB_3  
COMPARISON: None.  
RESULT:  
BRAIN:  
Acute Change: No acute   
infarct.  
Hemorrhage: Punctate   
magnetic susceptibility   
within the anterior LEFT  
frontal lobe. Additional   
similar finding within   
the anterior LEFT  
temporal lobe and RIGHT   
cerebellar hemisphere.  
Mass Lesion/ Mass   
Effect: No evidence of   
an intracranial mass or  
extra-axial fluid   
collection. No   
significant mass effect.  
Chronic Change:   
Scattered patchy and   
confluent areas of   
increased T2 and  
FLAIR signal are present   
in the supratentorial   
white matter which is  
nonspecific but likely   
represents chronic   
microvascular ischemia.   
Small  
remote infarcts in the   
cerebellar hemispheres   
bilaterally. Remote  
lacunar infarcts and/or   
dilated perivascular   
spaces in the basal   
ganglia  
bilaterally.  
Parenchyma: There is   
moderate generalized   
parenchymal volume loss.   
The  
brain parenchyma is   
otherwise within normal   
limits of signal   
intensity  
and morphology.  
Ventricles:   
Ventriculomegaly   
corresponds to the   
degree of parenchymal  
volume loss.  
Skull Base: Hypothalamic   
and pituitary region are   
grossly normal.  
Craniocervical junction   
is normal. No   
significant marrow   
replacement  
process.  
Vasculature: Major   
intracranial arterial   
structures, and dural   
venous  
sinuses show typical   
flow void, suggesting   
patency by spin echo   
criteria.  
Other: Postoperative   
changes involve the   
globes bilaterally.  
MRA neck/carotid   
arteries: Suboptimal   
secondary to patient   
motion.  
Carotid Stenosis:  
Right Common: No   
significant stenosis.  
Right Internal Carotid   
Plaque: Obscured  
Right Internal Carotid   
Stenosis (% by NASCET   
Criteria): Obscured  
Left Common: No   
significant stenosis.  
Left Internal Carotid   
Plaque: No significant   
plaque formation.  
Left Internal Carotid   
Stenosis (% by NASCET   
Criteria): Less than 30  
Cervical Vertebral   
Arteries:  
Patency: Bilateral  
Dominance: Codominant  
INTRACRANIAL MRA:  
The petrous, cavernous,   
and supraclinoid   
internal carotid   
arteries are  
patent. Anterior   
cerebral arteries and   
middle cerebral arteries   
are  
patent. Intracranial   
vertebral arteries,   
basilar artery, and   
posterior  
cerebral arteries are   
patent. No vessel   
cutoffs or aneurysms are  
identified. Fetal origin   
of the LEFT posterior   
cerebral artery.  
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   
of the RIGHT posterior   
communicating artery   
which  
is markedly delayed.  
IMPRESSION:  
No acute findings.   
Chronic changes.   
Multiple remote   
infarcts.  
MRA imaging of the head   
and neck demonstrates no   
large vessel occlusion  
or high-grade stenosis.  
: PSCB  
Transcribe Date/Time:   
Aug 6 2024 9:07A  
Dictated by : ARIANNA PARDO MD  
This examination was   
interpreted and the   
report reviewed and  
electronically signed   
by:  
ARIANNA PARDO MD on Aug 6   
2024 9:26AM EST  
[id number] Normal                                  [facility]  
   
                                                    MRA Head vessels WO contrast  
on 2024   
   
                                                            * * *Final Report* *  
 *  
  
DATE OF EXAM: Aug 6 2024   
8:35AM  
  
[facility] 0272 - MRA BRAIN WO   
IVCON / ACCESSION #   
[id number]  
  
PROCEDURE REASON:   
multiple diagnoses  
  
* * * * Physician   
Interpretation * * * *  
  
EXAMINATION: MRI BRAIN   
WO IVCON, MRA BRAIN WO   
IVCON, MRA CAROTID WO  
IVCON  
  
HISTORY: Hoarseness   
Dysphagia, unspecified   
type Swallowing disorder   
S/P  
cervical spinal fusion.  
  
TECHNIQUE: MRI of the   
brain without contrast.   
MRA of the brain and  
carotid arteries without   
contrast. Intracranial   
3D time-of-flight MRA  
with post-processing   
performed at the   
modality and 2D   
multiplanar and 3D  
maximum intensity   
projections were   
created, reviewed and   
archived.  
MQ: MRAB_3  
  
COMPARISON: None.  
  
  
RESULT:  
  
BRAIN:  
  
Acute Change: No acute   
infarct.  
  
Hemorrhage: Punctate   
magnetic susceptibility   
within the anterior LEFT  
frontal lobe. Additional   
similar finding within   
the anterior LEFT  
temporal lobe and RIGHT   
cerebellar hemisphere.  
  
Mass Lesion/ Mass   
Effect: No evidence of   
an intracranial mass or  
extra-axial fluid   
collection. No   
significant mass effect.  
  
Chronic Change:   
Scattered patchy and   
confluent areas of   
increased T2 and  
FLAIR signal are present   
in the supratentorial   
white matter which is  
nonspecific but likely   
represents chronic   
microvascular ischemia.   
Small  
remote infarcts in the   
cerebellar hemispheres   
bilaterally. Remote  
lacunar infarcts and/or   
dilated perivascular   
spaces in the basal   
ganglia  
bilaterally.  
  
Parenchyma: There is   
moderate generalized   
parenchymal volume loss.   
The  
brain parenchyma is   
otherwise within normal   
limits of signal   
intensity  
and morphology.  
  
Ventricles:   
Ventriculomegaly   
corresponds to the   
degree of parenchymal  
volume loss.  
  
Skull Base: Hypothalamic   
and pituitary region are   
grossly normal.  
Craniocervical junction   
is normal. No   
significant marrow   
replacement  
process.  
  
Vasculature: Major   
intracranial arterial   
structures, and dural   
venous  
sinuses show typical   
flow void, suggesting   
patency by spin echo   
criteria.  
  
Other: Postoperative   
changes involve the   
globes bilaterally.  
  
  
  
MRA neck/carotid   
arteries: Suboptimal   
secondary to patient   
motion.  
  
Carotid Stenosis:  
Right Common: No   
significant stenosis.  
Right Internal Carotid   
Plaque: Obscured  
Right Internal Carotid   
Stenosis (% by NASCET   
Criteria): Obscured  
  
Left Common: No   
significant stenosis.  
Left Internal Carotid   
Plaque: No significant   
plaque formation.  
Left Internal Carotid   
Stenosis (% by NASCET   
Criteria): Less than 30  
  
Cervical Vertebral   
Arteries:  
Patency:  Bilateral  
Dominance: Codominant  
  
  
INTRACRANIAL MRA:  
  
The petrous, cavernous,   
and supraclinoid   
internal carotid   
arteries are  
patent. Anterior   
cerebral arteries and   
middle cerebral arteries   
are  
patent. Intracranial   
vertebral arteries,   
basilar artery, and   
posterior  
cerebral arteries are   
patent. No vessel   
cutoffs or aneurysms are  
identified. Fetal origin   
of the LEFT posterior   
cerebral artery.  
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   
of the RIGHT posterior   
communicating artery   
which  
is markedly delayed.  
  
  
  
                                                            DIVISION OF   
RADIOLOGY  
   
                                                            Provider, [facility] - 2024   
Formatting of this note   
might be different from   
the original.  
* * *Final Report* * *  
  
DATE OF EXAM: Aug 6 2024   
8:35AM  
  
[facility] 0272 - MRA BRAIN WO   
IVCON / ACCESSION #   
[id number]  
  
PROCEDURE REASON:   
multiple diagnoses  
  
* * * * Physician   
Interpretation * * * *  
  
EXAMINATION: MRI BRAIN   
WO IVCON, MRA BRAIN WO   
IVCON, MRA CAROTID WO  
IVCON  
  
HISTORY: Hoarseness   
Dysphagia, unspecified   
type Swallowing disorder   
S/P  
cervical spinal fusion.  
  
TECHNIQUE: MRI of the   
brain without contrast.   
MRA of the brain and  
carotid arteries without   
contrast. Intracranial   
3D time-of-flight MRA  
with post-processing   
performed at the   
modality and 2D   
multiplanar and 3D  
maximum intensity   
projections were   
created, reviewed and   
archived.  
MQ: MRAB_3  
  
COMPARISON: None.  
  
  
RESULT:  
  
BRAIN:  
  
Acute Change: No acute   
infarct.  
  
Hemorrhage: Punctate   
magnetic susceptibility   
within the anterior LEFT  
frontal lobe. Additional   
similar finding within   
the anterior LEFT  
temporal lobe and RIGHT   
cerebellar hemisphere.  
  
Mass Lesion/ Mass   
Effect: No evidence of   
an intracranial mass or  
extra-axial fluid   
collection. No   
significant mass effect.  
  
Chronic Change:   
Scattered patchy and   
confluent areas of   
increased T2 and  
FLAIR signal are present   
in the supratentorial   
white matter which is  
nonspecific but likely   
represents chronic   
microvascular ischemia.   
Small  
remote infarcts in the   
cerebellar hemispheres   
bilaterally. Remote  
lacunar infarcts and/or   
dilated perivascular   
spaces in the basal   
ganglia  
bilaterally.  
  
Parenchyma: There is   
moderate generalized   
parenchymal volume loss.   
The  
brain parenchyma is   
otherwise within normal   
limits of signal   
intensity  
and morphology.  
  
Ventricles:   
Ventriculomegaly   
corresponds to the   
degree of parenchymal  
volume loss.  
  
Skull Base: Hypothalamic   
and pituitary region are   
grossly normal.  
Craniocervical junction   
is normal. No   
significant marrow   
replacement  
process.  
  
Vasculature: Major   
intracranial arterial   
structures, and dural   
venous  
sinuses show typical   
flow void, suggesting   
patency by spin echo   
criteria.  
  
Other: Postoperative   
changes involve the   
globes bilaterally.  
  
  
  
MRA neck/carotid   
arteries: Suboptimal   
secondary to patient   
motion.  
  
Carotid Stenosis:  
Right Common: No   
significant stenosis.  
Right Internal Carotid   
Plaque: Obscured  
Right Internal Carotid   
Stenosis (% by NASCET   
Criteria): Obscured  
  
Left Common: No   
significant stenosis.  
Left Internal Carotid   
Plaque: No significant   
plaque formation.  
Left Internal Carotid   
Stenosis (% by NASCET   
Criteria): Less than 30  
  
Cervical Vertebral   
Arteries:  
Patency: Bilateral  
Dominance: Codominant  
  
  
INTRACRANIAL MRA:  
  
The petrous, cavernous,   
and supraclinoid   
internal carotid   
arteries are  
patent. Anterior   
cerebral arteries and   
middle cerebral arteries   
are  
patent. Intracranial   
vertebral arteries,   
basilar artery, and   
posterior  
cerebral arteries are   
patent. No vessel   
cutoffs or aneurysms are  
identified. Fetal origin   
of the LEFT posterior   
cerebral artery.  
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   
of the RIGHT posterior   
communicating artery   
which  
is markedly delayed.  
  
  
  
  
IMPRESSION  
IMPRESSION:  
  
No acute findings.   
Chronic changes.   
Multiple remote   
infarcts.  
  
MRA imaging of the head   
and neck demonstrates no   
large vessel occlusion  
or high-grade stenosis.  
  
  
  
  
: GRICEL  
Transcribe Date/Time:   
Aug 6 2024 9:07A  
  
Dictated by : ARIANNA PARDO MD  
  
This examination was   
interpreted and the   
report reviewed and  
electronically signed   
by:  
ARIANNA PARDO MD on Aug 6   
2024 9:26AM [facility]  
   
                                                    MRA Neck vessels WO contrast  
on 2024   
   
                                                            * * *Final Report* *  
 *  
  
DATE OF EXAM: Aug 6 2024   
8:35AM  
  
[facility] 0275 - MRA CAROTID   
WO IVCON / ACCESSION #   
[id number]  
  
PROCEDURE REASON:   
multiple diagnoses  
  
* * * * Physician   
Interpretation * * * *  
  
EXAMINATION: MRI BRAIN   
WO IVCON, MRA BRAIN WO   
IVCON, MRA CAROTID WO  
IVCON  
  
HISTORY: Hoarseness   
Dysphagia, unspecified   
type Swallowing disorder   
S/P  
cervical spinal fusion.  
  
TECHNIQUE: MRI of the   
brain without contrast.   
MRA of the brain and  
carotid arteries without   
contrast. Intracranial   
3D time-of-flight MRA  
with post-processing   
performed at the   
modality and 2D   
multiplanar and 3D  
maximum intensity   
projections were   
created, reviewed and   
archived.  
MQ: MRAB_3  
  
COMPARISON: None.  
  
  
RESULT:  
  
BRAIN:  
  
Acute Change: No acute   
infarct.  
  
Hemorrhage: Punctate   
magnetic susceptibility   
within the anterior LEFT  
frontal lobe. Additional   
similar finding within   
the anterior LEFT  
temporal lobe and RIGHT   
cerebellar hemisphere.  
  
Mass Lesion/ Mass   
Effect: No evidence of   
an intracranial mass or  
extra-axial fluid   
collection. No   
significant mass effect.  
  
Chronic Change:   
Scattered patchy and   
confluent areas of   
increased T2 and  
FLAIR signal are present   
in the supratentorial   
white matter which is  
nonspecific but likely   
represents chronic   
microvascular ischemia.   
Small  
remote infarcts in the   
cerebellar hemispheres   
bilaterally. Remote  
lacunar infarcts and/or   
dilated perivascular   
spaces in the basal   
ganglia  
bilaterally.  
  
Parenchyma: There is   
moderate generalized   
parenchymal volume loss.   
The  
brain parenchyma is   
otherwise within normal   
limits of signal   
intensity  
and morphology.  
  
Ventricles:    
Ventriculomegaly   
corresponds to the   
degree of parenchymal  
volume loss.  
  
Skull Base: Hypothalamic   
and pituitary region are   
grossly normal.  
Craniocervical junction   
is normal. No   
significant marrow   
replacement  
process.  
  
Vasculature: Major   
intracranial arterial   
structures, and dural   
venous  
sinuses show typical   
flow void, suggesting   
patency by spin echo   
criteria.  
  
Other: Postoperative   
changes involve the   
globes bilaterally.  
  
  
  
MRA neck/carotid   
arteries: Suboptimal   
secondary to patient   
motion.  
  
Carotid Stenosis:  
Right Common: No   
significant stenosis.  
Right Internal Carotid   
Plaque: Obscured  
Right Internal Carotid   
Stenosis (% by NASCET   
Criteria): Obscured  
  
Left Common: No   
significant stenosis.  
Left Internal Carotid   
Plaque: No significant   
plaque formation.  
Left Internal Carotid   
Stenosis (% by NASCET   
Criteria): Less than 30  
  
Cervical Vertebral   
Arteries:  
Patency: Bilateral  
Dominance: Codominant  
  
  
INTRACRANIAL MRA:  
  
The petrous, cavernous,   
and supraclinoid   
internal carotid   
arteries are  
patent. Anterior   
cerebral arteries and   
middle cerebral arteries   
are  
patent. Intracranial   
vertebral arteries,   
basilar artery, and   
posterior  
cerebral arteries are   
patent. No vessel   
cutoffs or aneurysms are  
identified. Fetal origin   
of the LEFT posterior   
cerebral artery.  
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   
of the RIGHT posterior   
communicating artery   
which  
is markedly delayed.  
  
  
  
                                                            DIVISION OF   
RADIOLOGY  
   
                                                            Provider, Zaki Haskins  
[facility] - 2024   
Formatting of this note   
might be different from   
the original.  
* * *Final Report* * *  
  
DATE OF EXAM: Aug 6 2024   
8:35AM  
  
[facility] 0275 - MRA CAROTID   
WO IVCON / ACCESSION #   
[id number]  
  
PROCEDURE REASON:   
multiple diagnoses  
  
* * * * Physician   
Interpretation * * * *  
  
EXAMINATION: MRI BRAIN   
WO IVCON, MRA BRAIN WO   
IVCON, MRA CAROTID WO  
IVCON  
  
HISTORY: Hoarseness   
Dysphagia, unspecified   
type Swallowing disorder   
S/P  
cervical spinal fusion.  
  
TECHNIQUE: MRI of the   
brain without contrast.   
MRA of the brain and  
carotid arteries without   
contrast. Intracranial   
3D time-of-flight MRA  
with post-processing   
performed at the   
modality and 2D   
multiplanar and 3D  
maximum intensity   
projections were   
created, reviewed and   
archived.  
MQ: MRAB_3  
  
COMPARISON: None.  
  
  
RESULT:  
  
BRAIN:  
  
Acute Change: No acute   
infarct.  
  
Hemorrhage: Punctate   
magnetic susceptibility   
within the anterior LEFT  
frontal lobe. Additional   
similar finding within   
the anterior LEFT  
temporal lobe and RIGHT   
cerebellar hemisphere.  
  
Mass Lesion/ Mass   
Effect: No evidence of   
an intracranial mass or  
extra-axial fluid   
collection. No   
significant mass effect.  
  
Chronic Change:   
Scattered patchy and   
confluent areas of   
increased T2 and  
FLAIR signal are present   
in the supratentorial   
white matter which is  
nonspecific but likely   
represents chronic   
microvascular ischemia.   
Small  
remote infarcts in the   
cerebellar hemispheres   
bilaterally. Remote  
lacunar infarcts and/or   
dilated perivascular   
spaces in the basal   
ganglia  
bilaterally.  
  
Parenchyma: There is   
moderate generalized   
parenchymal volume loss.   
The  
brain parenchyma is   
otherwise within normal   
limits of signal   
intensity  
and morphology.  
  
Ventricles:   
Ventriculomegaly   
corresponds to the   
degree of parenchymal  
volume loss.  
  
Skull Base: Hypothalamic   
and pituitary region are   
grossly normal.  
Craniocervical junction   
is normal. No   
significant marrow   
replacement  
process.  
  
Vasculature: Major   
intracranial arterial   
structures, and dural   
venous  
sinuses show typical   
flow void, suggesting   
patency by spin echo   
criteria.  
  
Other: Postoperative   
changes involve the   
globes bilaterally.  
  
  
  
MRA neck/carotid   
arteries: Suboptimal   
secondary to patient   
motion.  
  
Carotid Stenosis:  
Right Common: No   
significant stenosis.  
Right Internal Carotid   
Plaque: Obscured  
Right Internal Carotid   
Stenosis (% by NASCET   
Criteria): Obscured  
  
Left Common: No   
significant stenosis.  
Left Internal Carotid   
Plaque: No significant   
plaque formation.  
Left Internal Carotid   
Stenosis (% by NASCET   
Criteria): Less than 30  
  
Cervical Vertebral   
Arteries:  
Patency: Bilateral  
Dominance: Codominant  
  
  
INTRACRANIAL MRA:  
  
The petrous, cavernous,   
and supraclinoid   
internal carotid   
arteries are  
patent. Anterior   
cerebral arteries and   
middle cerebral arteries   
are  
patent. Intracranial   
vertebral arteries,   
basilar artery, and   
posterior  
cerebral arteries are   
patent. No vessel   
cutoffs or aneurysms are  
identified. Fetal origin   
of the LEFT posterior   
cerebral artery.  
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   
of the RIGHT posterior   
communicating artery   
which  
is markedly delayed.  
  
  
  
  
IMPRESSION  
IMPRESSION:  
  
No acute findings.   
Chronic changes.   
Multiple remote   
infarcts.  
  
MRA imaging of the head   
and neck demonstrates no   
large vessel occlusion  
or high-grade stenosis.  
  
  
  
  
: [facility]  
Transcribe Date/Time:   
Aug 6 2024 9:07A  
  
Dictated by : ARIANNA PARDO MD  
  
This examination was   
interpreted and the   
report reviewed and  
electronically signed   
by:  
ARIANNA PARDO MD on Aug 6   
2024 9:26AM EST  
  
  
                                                            [facility]  
   
                                                    MRI BRAIN WO IVCONon 06-2  
024   
   
                                        MRI BRAIN WO IVCON  * * *Final Report* *  
 *  
DATE OF EXAM: Aug 6 2024   
8:35AM  
[facility] 0294 - MRI BRAIN WO   
IVCON / ACCESSION #   
[id number]  
PROCEDURE REASON:   
multiple diagnoses  
  
* * * * Physician   
Interpretation * * * *  
EXAMINATION: MRI BRAIN   
WO IVCON, MRA BRAIN WO   
IVCON, MRA CAROTID WO  
IVCON  
HISTORY: Hoarseness   
Dysphagia, unspecified   
type Swallowing disorder   
S/P  
cervical spinal fusion.  
TECHNIQUE: MRI of the   
brain without contrast.   
MRA of the brain and  
carotid arteries without   
contrast. Intracranial   
3D time-of-flight MRA  
with post-processing   
performed at the   
modality and 2D   
multiplanar and 3D  
maximum intensity   
projections were   
created, reviewed and   
archived.  
MQ: MRAB_3  
COMPARISON: None.  
RESULT:  
BRAIN:  
Acute Change: No acute   
infarct.  
Hemorrhage: Punctate   
magnetic susceptibility   
within the anterior LEFT  
frontal lobe. Additional   
similar finding within   
the anterior LEFT  
temporal lobe and RIGHT   
cerebellar hemisphere.  
Mass Lesion/ Mass   
Effect: No evidence of   
an intracranial mass or  
extra-axial fluid   
collection. No   
significant mass effect.  
Chronic Change:   
Scattered patchy and   
confluent areas of   
increased T2 and  
FLAIR signal are present   
in the supratentorial   
white matter which is  
nonspecific but likely   
represents chronic   
microvascular ischemia.   
Small  
remote infarcts in the   
cerebellar hemispheres   
bilaterally. Remote  
lacunar infarcts and/or   
dilated perivascular   
spaces in the basal   
ganglia  
bilaterally.  
Parenchyma: There is   
moderate generalized   
parenchymal volume loss.   
The  
brain parenchyma is   
otherwise within normal   
limits of signal   
intensity  
and morphology.  
Ventricles:   
Ventriculomegaly   
corresponds to the   
degree of parenchymal  
volume loss.  
Skull Base: Hypothalamic   
and pituitary region are   
grossly normal.  
Craniocervical junction   
is normal. No   
significant marrow   
replacement  
process.  
Vasculature: Major   
intracranial arterial   
structures, and dural   
venous  
sinuses show typical   
flow void, suggesting   
patency by spin echo   
criteria.  
Other: Postoperative   
changes involve the   
globes bilaterally.  
MRA neck/carotid   
arteries: Suboptimal   
secondary to patient   
motion.  
Carotid Stenosis:  
Right Common: No   
significant stenosis.  
Right Internal Carotid   
Plaque: Obscured  
Right Internal Carotid   
Stenosis (% by NASCET   
Criteria): Obscured  
Left Common: No   
significant stenosis.  
Left Internal Carotid   
Plaque: No significant   
plaque formation.  
Left Internal Carotid   
Stenosis (% by NASCET   
Criteria): Less than 30  
Cervical Vertebral   
Arteries:  
Patency: Bilateral  
Dominance: Codominant  
INTRACRANIAL MRA:  
The petrous, cavernous,   
and supraclinoid   
internal carotid   
arteries are  
patent. Anterior   
cerebral arteries and   
middle cerebral arteries   
are  
patent. Intracranial   
vertebral arteries,   
basilar artery, and   
posterior  
cerebral arteries are   
patent. No vessel   
cutoffs or aneurysms are  
identified. Fetal origin   
of the LEFT posterior   
cerebral artery.  
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   
of the RIGHT posterior   
communicating artery   
which  
is markedly delayed.  
IMPRESSION:  
No acute findings.   
Chronic changes.   
Multiple remote   
infarcts.  
MRA imaging of the head   
and neck demonstrates no   
large vessel occlusion  
or high-grade stenosis.  
: [facility]  
Transcribe Date/Time:   
Aug 6 2024 9:07A  
Dictated by : ARIANNA PARDO MD  
This examination was   
interpreted and the   
report reviewed and  
electronically signed   
by:  
ARIANNA PARDO MD on Aug 6   
2024 9:26AM EST  
[id number]                                  [facility]  
   
                                                    No Panel Informationon    
   
                                                            IMPRESSION:  
  
No acute findings.   
Chronic changes.   
Multiple remote   
infarcts.  
  
MRA imaging of the head   
and neck demonstrates no   
large vessel occlusion  
or high-grade stenosis.  
  
  
  
  
: [facility]  
Transcribe Date/Time:   
Aug 6 2024 9:07A  
  
Dictated by : ARIANNA PARDO MD  
  
This examination was   
interpreted and the   
report reviewed and  
electronically signed   
by:  
ARIANNA PARDO MD on Aug 6   
2024 9:26AM EST  
  
                                                            DIVISION OF   
RADIOLOGY  
   
                                                    Radiology Study   
observation   
(narrative)                                                     [facility]  
   
                                                    No Panel InformationOrdered   
By: [facility] Provider on 2024   
   
                                                                  [facility]  
   
                                                    [place] 2024   
   
                                        JOHNATHAN                Telephone (DILLON)  
------------------------  
--------  
ELMIRA WARNER   
([id number]) 1945 F  
Date Time Provider   
Department  
24 EUGENE SEPULVEDA JR  
During your visit today,   
we recorded the   
following information   
about you:  
Quattrocchi, Sepideh, LPN   
2024 11:09 AM Signed  
Patient calling she is   
scheduled for MRI on   
2024. She is   
claustrophobic and  
is asking for a pre   
medication to be able to   
do the MRI. Patient uses   
[facility] for her   
pharmacy. Patient asking   
about the MRA if it is   
going to be  
tight like the MRI is?   
She would need pre   
medication for that   
also. She is  
scheduled for that on   
2024.  
Please advise  
Xochilt Azul APRN.CNP 2024 12:06   
PM Signed  
PDMP website checked and   
validated. All   
prescriptions have been   
APPROPRIATELY  
filled. No suspicious   
activity was identified.  
2024 UMER Thurman.Jolene Savage LPN   
2024 1:08 PM Signed  
TC to pt who voiced   
understanding.  
Jolene Gupta LPN  
Allergies As of Date:   
2024 Noted Allergy   
Reaction  
ADHESIVE TAPE-SILICONES   
01/15/2020 2 - Rash  
DEMEROL (MEPERIDINE   
(PF)) 2012 11 -   
Vomiting  
Comments: migraine  
INFLUENZA VACCINE TRI-SP   
-10 2012 6 -   
Diarrhea  
Comments: vomiting/fever  
IODINE 2012 14 -   
Other: See Comments  
Comments: sick   
/headaches  
PENICILLINS 2012 4   
- Hives  
Comments: throat and   
mouth swelling  
SHELLFISH 2012 4 -   
Hives  
Date Reviewed:   
2024  
Reviewed by: Xochilt Azul APRN.CNP -   
Fully Assessed  
Reason for Visit:  
Medication Request [138]  
Primary Visit   
Diagnosis:Claustrophobia   
[F40.240]  
Order(s):LORazepam   
(ATIVAN) 0.5 mgTake one   
tablet 20-30 minutes   
before your  
MRI and MRA.Disp: 2   
tabletRfl: 0  
Prescriptions as of   
2024  
- LORazepam (ATIVAN) 0.5   
mg  
Take one tablet 20-30   
minutes before your MRI   
and MRA.  
- losartan (COZAAR) 25   
mg tablet  
Take 1 tablet by mouth   
once daily.  
- albuterol (PROVENTIL)   
2.5 mg /3 mL (0.083 %)   
nebulizer solution  
Use 3 mL via nebulizer   
every 6 hours as needed.  
- nitroglycerin   
sublingual (NITROSTAT)   
0.4 mg SL tablet  
Dissolve 1 tablet under   
the tongue every 5   
minutes as needed for   
chest pain.  
Max of 3 doses. If no   
relief, call 911.  
- folic acid 0.8 mg cap  
Take 300 mg by mouth   
once daily.  
- vit B complex-C-folic   
ac-zinc 800 mcg- 12.5 mg   
tab  
Take by mouth.  
- levalbuterol tartrate   
HFA (XOPENEX HFA) 45   
mcg/actuation inhaler  
Inhale 2 Puffs as   
instructed every 6 hours   
as needed.  
- vit B complex   
no.12/niacin,B3,   
(VITAMIN B COMPLEX   
NO.12-NIACIN ORAL)  
Take by mouth.  
-   
TUMERIC-GING-OLIVE-OREG-  
CAPRYL ORAL  
Take by mouth.  
- Bisacodyl (DULCOLAX) 5   
mg tab  
Use as directed for   
Miralax / Gatorade Bowel   
Prep Kit  
- Nebulizers (AERONEB GO   
NEBULIZER)  
1 Each as directed.   
Portable Nebulizer with   
tubing, Dx: mild   
persistent asthma  
- Lactobac   
no.41/Bifidobact no.7   
(PROBIOTIC-10 ORAL)  
Take by mouth.  
- MULTIVITAMIN (VITAMIN   
A DAY ORAL)  
Take by mouth.  
- Cholecalciferol,   
Vitamin D3, 25 mcg   
(1,000 unit) cap  
Take 1,000 Units by   
mouth once daily.  
- Ascorbic Acid 1,000 mg   
tablet  
Take 1,000 mg by mouth   
once daily.  
- Aspirin 81 mg Tab  
Take 162 mg by mouth   
once daily.  
- GLUC COLBERT/CHONDRO COLBERT   
A/VIT C/MN (GLUCOSAMINE   
1500 COMPLEX ORAL)  
Take by mouth.  
- CALCIUM   
CARBONATE/VITAMIN D3   
(CALCIUM + D ORAL)  
Take by mouth.  
Problem List As Of Date   
2024 Noted   
Resolved  
Hyperlipidemia, mixed   
[E78.2] 01/15/2020  
Coronary artery disease   
involving native heart   
*01/15/2020  
Hypokalemia [E87.6]   
01/15/2020  
Asymptomatic   
postmenopausal status   
[Z78.0] 01/15/2020  
TIA (transient ischemic   
attack) [G45.9]   
01/15/2020  
Mild intermittent   
asthma, uncomplicated   
[J45.20]01/15/2020  
Essential hypertension   
[I10] 01/15/2020  
Palpitations [R00.2]   
2022  
Right shoulder injury,   
subsequent encounter   
[S4*2022  
Obesity, Class I, BMI   
30-34.9 [E66.9]   
2022  
Prescriptions ordered   
this encounter Disp   
Refills Start End  
LORAZEPAM 0.5 MG TABLET   
2 ta* 0 2024  
Sig: Take one tablet   
20-30 minutes before   
your MRI and MRA.  
Encounter Number:   
[id number]  
Encounter Status:Closed   
by XOCHILT AZUL   
on 24           [facility]  
   
                                                    CNOVon 2024   
   
                                        CNOV                Office Visit (FAMPWS  
)  
------------------------  
--------  
ELMIRA WARNER   
([id number]) 1945 F  
Date Time Provider   
Department  
24 10:00 AM [facility]  
During your visit today,   
we recorded the   
following information   
about you:  
Pulse Respiration Blood   
pressure Weight  
72/minute 14/minute   
158/80 77.2 kg  
Yanet Shen APRN.CNP   
2024 10:44 AM   
Signed  
Chief Complaint  
Patient presents with:  
BP Check  
HPI  
Elmira Warner is a   
79 year old female who   
presents here today for   
Above  
Complaints.  
Elmira is an established   
patient of Dr. Michael DO and myself.  
Concerns today..  
BP check --  
Was taken off of   
lisinopril by Dr. Sepulveda   
on  d/t concern that   
cough and  
hoarseness were caused   
by this. Pt reports   
cough and hoarseness   
have completely  
resolved since having   
lisinopril on hold x 2   
weeks now.  
Pt has been taking BP at   
home daily and normally   
BP around 138-145/90s   
without  
medication regimen.  
Pt has been eating out   
more than usual the past   
few days and has noticed   
BP  
slowly rising based on   
what she eats.  
Feels she needs to be on   
something for BP to help   
her heart.  
She denies chest pain,   
shortness of breath,   
palpitations, dizziness,   
leg edema,  
headaches, or vision   
changes.  
Last 14 Encounter BP   
Readings:  
Date: BP:  
2024 158/80  
2024 138/82  
2024 150/70  
2024 152/94  
2023 122/70  
2022 150/90  
2022 130/86  
2022 160/100  
2022 150/80  
2022 165/80  
2022 162/104  
2022 134/62  
04/15/2022 118/62  
2022 140/90  
Past medical history,   
appointments,   
medications, allergies   
reviewed.  
Previous Medical History  
PAST MEDICAL HISTORY  
No date: Abdominal pain,   
right lower quadrant  
No date: Abdominal pain,   
right upper quadrant  
No date: Cerebrovascular   
disease, unspecified  
No date: Coronary   
atherosclerosis of   
unspecified type of   
vessel,  
native or graft  
No date: Diverticulosis   
of colon (without   
mention of hemorrhage)  
No date: Essential   
hypertension, benign  
No date: Intrinsic   
asthma with status   
asthmaticus  
No date: Mixed   
hyperlipidemia  
No date: Osteoporosis,   
unspecified  
No date: Thoracic or   
lumbosacral neuritis or   
radiculitis, unspecified  
Previous Surgical   
History  
PAST SURGICAL HISTORY  
times 3: ANGIOPLASTY  
Comment: 7 stents  
No date: APPENDECTOMY  
12 years ago:   
COLONOSCOPY FLX DX   
W/COLLJ SPEC WHEN PFRMD  
Comment: Colonoscopy  
2012: COLONOSCOPY   
FLX DX W/COLLJ SPEC WHEN   
PFRMD  
Comment: Colonoscopy  
No date: HYSTERECTOMY HX  
: PAST SURGICAL   
HISTORY OF  
Comment: cervical spinal   
fusion  
Family History  
FAMILY HISTORY  
Problem Relation Age of   
Onset  
Stroke Father 52  
heart dx,osteoarthritis  
other (rheumatic fever)   
Sister  
other (rheumatic fever)   
Sister  
other (heart disease)   
Sister  
other (heart disease)   
Brother  
Patient Allergies  
ALLERGIES  
Allergen Reactions  
Adhesive Tape-Silic*   
Rash  
Demerol [Meperidine*   
Vomiting  
migraine  
Influenza Vaccine T*   
Diarrhea  
vomiting/fever  
Iodine Other: See   
Comments  
sick /headaches  
Penicillins Hives  
throat and mouth   
swelling  
Shellfish Hives  
Current Medications  
Current Outpatient   
Medications on File   
Prior to Visit  
Medication Sig  
albuterol (PROVENTIL)   
2.5 mg /3 mL (0.083 %)   
nebulizer solution Use 3   
mL via  
nebulizer every 6 hours   
as needed.  
nitroglycerin sublingual   
(NITROSTAT) 0.4 mg SL   
tablet Dissolve 1 tablet   
under  
the tongue every 5   
minutes as needed for   
chest pain. Max of 3   
doses. If no  
relief, call 911.  
folic acid 0.8 mg cap   
Take 300 mg by mouth   
once daily.  
vit B complex-C-folic   
ac-zinc 800 mcg- 12.5 mg   
tab Take by mouth.  
levalbuterol tartrate   
HFA (XOPENEX HFA) 45   
mcg/actuation inhaler   
Inhale 2  
Puffs as instructed   
every 6 hours as needed.  
vit B complex   
no.12/niacin,B3,   
(VITAMIN B COMPLEX   
NO.12-NIACIN ORAL) Take   
by  
mouth.  
TUMERIC-GING-OLIVE-OREG-  
CAPRYL ORAL Take by   
mouth.  
Nebulizers (AERONE[employer] GO   
NEBULIZER) 1 Each as   
directed. Portable   
Nebulizer with  
tubing, Dx: mild   
persistent asthma  
MULTIVITAMIN (VITAMIN A   
DAY ORAL) Take by mouth.  
Cholecalciferol, Vitamin   
D3, 25 mcg (1,000 unit)   
cap Take 1,000 Units by   
mouth  
once daily.  
Ascorbic Acid 1,000 mg   
tablet Take 1,000 mg by   
mouth once daily.  
Aspirin 81 mg Tab Take   
162 mg by mouth once   
daily.  
lisinopril (ZESTRIL) 5   
mg tablet Take 1 tablet   
by mouth once daily.   
(Patient  
not taking: Reported on   
2024)  
Bisacodyl (DULCOLAX) 5   
mg tab Use as directed   
for Miralax / Gatorade   
Bowel  
Prep Kit  
Lactobac   
no.41/Bifidobact no.7   
(PROBIOTIC-10 ORAL) Take   
by mouth. (Patient not  
taking: Reported on   
2024)  
GLUC COLBERT/CHONDRO COLBERT A/VIT   
C/MN (GLUCOSAMINE 1500   
COMPLEX ORAL) Take by   
mouth.  
(Patient not taking:   
Reported on 2024)  
CALCIUM   
CARBONATE/VITAMIN D3   
(CALCIUM + D ORAL) Take   
by mouth. (Patient not  
taking: Reported on   
2024)  
No current   
facility-administered   
medications on file   
prior to visit.  
Social History  
Social History  
To (more content not   
included)...        Normal                                  [facility]   
   
                                        CNPN                [facility] ([facility]  
------------------------  
--------  
ELMIRA WARNER   
([id number]) 1945 F  
Date Time Provider   
Department  
24 BEBETO RODRIGUEZ  
During your visit today,   
we recorded the   
following information   
about you:  
Jeni Akers RN   
2024 8:44 AM Signed  
Patient calls and states   
that she has been   
holding lisinopril since   
07/15/2024.  
Patient reports that her   
blood pressure today is   
162/88, pulse 84.   
Patient is  
currently on vacation.   
Patient is not having   
any symptoms. Patient   
states  
that she was supposed to   
let provider know if   
blood pressure is   
elevated since  
lisinopril has been on   
hold.  
Please review and   
advise,  
AUSTIN Rosado Alyson, APRN.CNP   
2024 10:56 AM   
Signed  
Why was lisinopril being   
held? I don't see any   
documentation of this.  
Also, please schedule   
her an appointment once   
she returns from   
vacation to  
assess BP.  
Thank you,  
UMER Saha.Jeni Garcia RN   
2024 11:24 AM   
Signed  
Lisinopril was being   
held due to Dr. Sepulveda   
suggestions about   
hoarseness being  
possibly related to   
delayed reaction to   
lisinopril.  
AUSTIN Rosado Alyson, APRN.CNP   
2024 12:16 PM   
Signed  
Please see if holding   
lisinopril has improved   
any of her symptoms of   
dysphagia,  
hoarseness, or   
swallowing issues. If   
not, I would recommend   
restarting it and  
continuing to monitor BP   
closely.  
Still needs appointment   
once returns from   
vacation.  
Thank you,  
UMER Saha.Janny Pettit LPN 2024 2:06 PM   
Signed  
Pt. informed symptoms   
have gotten better. She   
will discuss at   
Appointment.  
Allergies As of Date:   
2024 Noted Allergy   
Reaction  
ADHESIVE TAPE-SILICONES   
01/15/2020 2 - Rash  
DEMEROL (MEPERIDINE   
(PF)) 2012 11 -   
Vomiting  
Comments: migraine  
INFLUENZA VACCINE TRI-SP   
-10 2012 6 -   
Diarrhea  
Comments: vomiting/fever  
IODINE 2012 14 -   
Other: See Comments  
Comments: sick   
/headaches  
PENICILLINS 2012 4   
- Hives  
Comments: throat and   
mouth swelling  
SHELLFISH 2012 4 -   
Hives  
Date Reviewed:   
2024  
Reviewed by: Eugene Sepulveda Jr., MD -   
Fully Assessed  
Reason for Visit:  
Patient Update [1234]   
Cmt: Blood Pressure  
Prescriptions as of   
2024  
- albuterol (PROVENTIL)   
2.5 mg /3 mL (0.083 %)   
nebulizer solution  
Use 3 mL via nebulizer   
every 6 hours as needed.  
- nitroglycerin   
sublingual (NITROSTAT)   
0.4 mg SL tablet  
Dissolve 1 tablet under   
the tongue every 5   
minutes as needed for   
chest pain.  
Max of 3 doses. If no   
relief, call 911.  
- lisinopril (ZESTRIL) 5   
mg tablet  
Take 1 tablet by mouth   
once daily.  
- folic acid 0.8 mg cap  
Take 300 mg by mouth   
once daily.  
- vit B complex-C-folic   
ac-zinc 800 mcg- 12.5 mg   
tab  
Take by mouth.  
- levalbuterol tartrate   
HFA (XOPENEX HFA) 45   
mcg/actuation inhaler  
Inhale 2 Puffs as   
instructed every 6 hours   
as needed.  
- vit B complex   
no.12/niacin,B3,   
(VITAMIN B COMPLEX   
NO.12-NIACIN ORAL)  
Take by mouth.  
-   
TUMERIC-GING-OLIVE-OREG-  
CAPRYL ORAL  
Take by mouth.  
- Bisacodyl (DULCOLAX) 5   
mg tab  
Use as directed for   
Miralax / Gatorade Bowel   
Prep Kit  
- Nebulizers (AERONEB GO   
NEBULIZER)  
1 Each as directed.   
Portable Nebulizer with   
tubing, Dx: mild   
persistent asthma  
- Lactobac   
no.41/Bifidobact no.7   
(PROBIOTIC-10 ORAL)  
Take by mouth.  
- MULTIVITAMIN (VITAMIN   
A DAY ORAL)  
Take by mouth.  
- Cholecalciferol,   
Vitamin D3, 25 mcg   
(1,000 unit) cap  
Take 1,000 Units by   
mouth once daily.  
- Ascorbic Acid 1,000 mg   
tablet  
Take 1,000 mg by mouth   
once daily.  
- Aspirin 81 mg Tab  
Take 162 mg by mouth   
once daily.  
- GLUC COLBERT/CHONDRO COLBERT   
A/VIT C/MN (GLUCOSAMINE   
1500 COMPLEX ORAL)  
Take by mouth.  
- CALCIUM   
CARBONATE/VITAMIN D3   
(CALCIUM + D ORAL)  
Take by mouth.  
Problem List As Of Date   
2024 Noted   
Resolved  
Hyperlipidemia, mixed   
[E78.2] 01/15/2020  
Coronary artery disease   
involving native heart   
*01/15/2020  
Hypokalemia [E87.6]   
01/15/2020  
Asymptomatic   
postmenopausal status   
[Z78.0] 01/15/2020  
TIA (transient ischemic   
attack) [G45.9]   
01/15/2020  
Mild intermittent   
asthma, uncomplicated   
[J45.20]01/15/2020  
Essential hypertension   
[I10] 01/15/2020  
Palpitations [R00.2]   
2022  
Right shoulder injury,   
subsequent encounter   
[S4*2022  
Obesity, Class I, BMI   
30-34.9 [E66.9]   
2022  
Encounter Number:   
[id number]  
Encounter Status:Closed   
by JANNY CRISTINA LPN on 24      Normal                                  [facility]  
   
                                                    ACETYLCHOLINE REC BINDING AB  
on 2024   
   
                                                    ACETYLCHOLINE BINDING,   
QUAL            Negative        Normal          Negative        [facility]  
   
                                        Comment on above:   Order Comment: Speci  
men Type: BLOOD SPECIMEN  
Ordering Facility: [facility]  
Address: [address] [place] [address]   
   
                                                            Result Comment: Anti  
-acetylcholine receptor binding antibody   
test is used as an aid in diagnosis of myasthenia gravis. A   
negative result cannot exclude myasthenia gravis. Clinical   
correlation is required.   
   
                                                            Performed By: #### 1  
[id number] ####  
[facility]  
CLIA [id number]  
[address] [place] [address] [place]   
   
                                                    Acetylcholine receptor   
binding Ab (S)   
[Moles/Vol]     0.10 nmol/L     Normal          <0.21           [facility]  
   
                                        Comment on above:   Order Comment: Speci  
men Type: BLOOD SPECIMEN  
Ordering Facility: [facility]  
Address: [address] [place] [address]   
   
                                                            Performed By: #### 1  
[id number] ####  
[facility]  
CLIA [id number]  
[address] [place] [address] [place]   
   
                                                    ACETYLCHOLINE REC BLOCKING A  
Bon 2024   
   
                                                    ACETYLCHOLINE   
BLOCKING, QUAL  Negative        Normal          Negative        [facility]  
   
                                        Comment on above:   Order Comment: Speci  
men Type: BLOOD SPECIMEN  
Ordering Facility: [facility]  
Address: [address] [place] [address]   
   
                                                            Result Comment: Anti  
-acetylcholine receptor blocking antibody   
test is used as an aid in diagnosis of myasthenia gravis. A   
negative result cannot exclude myasthenia gravis. Clinical   
correlation is required.   
   
                                                            Performed By: #### 1  
[id number] ####  
[facility]  
CLIA [id number]  
[address] [place]   
   
                                                    Acetylcholine receptor   
blocking   
Ab/Acetylcholine   
Ab.total (S) [Molar   
fraction]       <13             Normal          <21             [facility]  
   
                                        Comment on above:   Order Comment: Speci  
men Type: BLOOD SPECIMEN  
Ordering Facility: [facility]  
Address: [address] [place] [address]   
   
                                                            Performed By: #### 1  
[id number] ####  
[facility]  
CLIA [id number]  
[facility] [address] [place]   
   
                                                    ACETYLCHOLINE RECEPTOR MODUL  
ATING ANTIBODYon 2024   
   
                                                    ACETYLCHOLINE   
RECEPT/MODULATING 2 %             Normal          <=45            [facility]  
   
                                        Comment on above:   Order Comment: Speci  
men Type: BLOOD SPECIMEN  
Ordering Facility: [facility]  
Address: [address] [place] [address]   
   
                                                            Result Comment: INTE  
RPRETIVE INFORMATION: Acetylcholine   
Modulating Ab  
Negative .......... 0-45 percent modulating  
Positive .......... 46 percent or greater modulating  
Approximately 85-90 percent of patients with myasthenia gravis  
(MG) express antibodies to the acetylcholine receptor (AChR),  
which can be divided into binding, blocking, and modulating  
antibodies. Binding antibody can activate complement and lead   
to  
loss of AChR. Blocking antibody may impair binding of  
acetylcholine to the receptor, leading to poor muscle   
contraction.  
Modulating antibody causes receptor endocytosis resulting in   
loss  
of AChR expression, which correlates most closely with clinical  
severity of disease. Approximately 10-15 percent of individuals  
with confirmed myasthenia gravis have no measurable binding,  
blocking, or modulating antibodies.  
This test was developed and its performance characteristics  
determined by [facility]. It has not been cleared or  
approved by the US Food and Drug Administration. This test was  
performed in a CLIA certified laboratory and is intended for  
clinical purposes.  
Performed By: [facility]  
[address] [place] [address]  
: Naeem Gonsales MD, PhD  
CLIA Number: [id number]   
   
                                                            Performed By: #### 1  
[id number] ####  
[facility]  
CLIA [id number]  
[address] [place] [address] [place]   
   
                                                    CNOVon 2024   
   
                                        CNOV                Office Visit ([facility]  
)  
------------------------  
--------  
ELMIRA WARNER   
([id number]) 1945 F  
Date Time Provider   
Department  
24 10:40 AM EUGENE SEPULVEDA JR  
During your visit today,   
we recorded the   
following information   
about you:  
Pulse Respiration Blood   
pressure Weight  
75/minute 16/minute   
138/82 76.4 kg  
Eugene Sepulveda Jr., MD   
2024 11:57 AM   
Signed  
NEW PATIENT (CONSULT)   
HISTORY AND PHYSICAL   
EXAM  
PRIMARY CARE PHYSICIAN:   
Bebeto Rodriguez DO  
REASON FOR CONSULT: See   
below  
REFERRING PHYSICIAN:   
Bebeto Rodriguez DO  
CHIEF COMPLAINT:   
Troubles swallowing  
Consultation requested   
by Bebeto Rodriguez DO for an opinion   
regarding chief  
complaint of  
Patient presents with:  
New Patient: New patient   
consult PCP, reported Hx   
spinal surgery neck   
region  
, c/o difficulty   
swallowing, change in   
voice.  
and my final   
recommendations will be   
communicated back to the   
requesting  
physician by way of   
shared medical record or   
letter via US mail.  
HISTORY OF PRESENT   
ILLNESS: Elmira Warner is a 79 year   
old female, BMI  
29.83 kg/m2 with a PMH   
significant for C spine   
surgery in  -   
involving 3  
disc per patient; and   
history as below.   
Referred for dysphagia,   
swallowing  
issues, hoarseness. Per   
PCP note of 24:  
Hoarseness, coordination   
of swallowing issues.   
Has had chronically. Has   
been  
seen by ENT Dr. Kumari   
whom recommended that   
she sees Neurologist for  
additional testing Per   
ENT note under scanned   
documents appears that  
endoscopy was   
essentially normal.  
Pt states symptom   
started s/p C spine   
surgery and nothing   
before. Patient  
feels symptoms are   
worsening and has   
swelling and feels like   
choking all the  
time. Will start   
coughing for no reason   
at all. Only symptoms   
prior to  
surgery was loss of   
muscle strength in the   
upper extremities. On   
lisinopril  
but pt  has been   
on since  with no   
issues. [facility] records   
suggest since  
.  
Never had prior   
swallowing studies.  
When asked about vision,   
 has problems,   
where she gets zig zag   
patterns  
but has history of   
migraines and chronic.   
Never sees double or   
diplopia.  
Denies ptosis initially   
but then later states   
that the L eye appears   
to droop  
sometimes. Patient does   
have wheezing during   
interview but states   
chronic and  
blames on ASA. Voice   
becomes more hoarse as   
the day goes on but not   
always.  
Unclear if temperature   
influence on symptoms.   
States overall easily   
drained in  
heat.  
Patient states that was   
not aware of symptoms   
until 3 weeks after   
surgery.  
States can choke on a   
pill, a piece of meat, a   
carrot... however, does   
not  
recall choking on   
liquids.  
 spine surgery is   
who referred patient to   
ENT at time of 1 year   
follow  
up. Surgery was for   
severe C spine stenosis.  
 has had multiple   
episodes since surgery   
where she could not get   
words  
out or pronounce words.   
Stroke risk factors of   
CAD, HTN, Elevated LDL.  
TSH  
Date Value Ref Range   
Status  
2024 2.540 0.270 -   
4.200 mIU/L Final  
REVIEW OF SYSTEMS  
GENERAL:No weight loss,   
malaise or fevers.  
HEENT:Negative for   
frequent or significant   
headaches, No changes in   
hearing or  
vision, no nose bleeds   
or other nasal problems  
NECK:Chronic neck pain.  
RESPIRATORY: Positive   
for cough, wheezing and   
shortness of breath when   
tired or  
with exertion.  
CARDIOVASCULAR: Negative   
for chest pain, or   
palpitations.  
GASTROINTESTINAL:   
Negative for abdominal   
discomfort, blood in   
stools or black  
stools or change in   
bowel habits  
GENITOURINARY: No   
history of dysuria,   
frequency or   
incontinence  
MUSCULOSKELETAL:   
Negative for joint pain   
or swelling, back pain   
or muscle pain.  
NEUROLOGIC:Negative for   
focal numbness or   
weakness, headaches and   
dizziness or  
syncope, vision changes,   
speech/language changes,   
changes in gait or falls   
--  
besides those complaints   
as above in HPI.  
SKIN:Negative for   
lesions, rash, and   
itching.  
HEMATOLOGIC/LYMPHATIC/IM  
MUNOLOGIC:Negative for   
prolonged bleeding,   
bruising  
easily or swollen nodes.  
ENDOCRINE: Negative for   
cold or heat   
intolerance, polyuria,   
polydipsia and  
goiter.  
The remainder of the ROS   
was reviewed and is   
negative.  
LAB/IMAGING:  
Reviewed and include:  
WBC (k/uL)  
Date Value  
2023 8.27  
RBC (m/uL)  
Date Value  
2023 4.23  
Hemoglobin (g/dL)  
Date Value  
2023 12.3  
Hematocrit (%)  
Date Value  
2023 38.6  
MCV (fL)  
Date Value  
2023 91.3  
MCH (pg)  
Date Value  
2023 29.1  
MCHC (g/dL)  
Date Value  
2023 31.9  
RDW-CV (%)  
Date Value  
2023 13.5  
Platelet Count (k/uL)  
Date Value  
2023 229  
MPV (fL)  
Date Value  
2023 11.8  
Glucose (mg/dL)  
Date Value  
2024 95  
BUN (mg/dL)  
Date Value  
2024 15  
Creatinine (mg/dL)  
Date Value  
2024 1.01 (H)  
Sodium (mmol/L)  
Date Value  
2024 137  
Potassium (mmol/L)  
Date Value  
2024 4.1  
Chloride (mmol/L)  
Date Value  
2024 103  
CO2 (mmol/L)  
Date Valu (more content   
not included)...    Normal                                  [facility]  
   
                                                    CNPNon 2024   
   
                                        CNPN                Telephone (SLEWST)  
------------------------  
--------  
ELMIRA WARNER   
([id number]) 1945 F  
Date Time Provider   
Department  
24 EUGENE SEPULVEDA JR  
During your visit today,   
we recorded the   
following information   
about you:  
Laura Kowalski LPN   
2024 6:42 PM Signed  
Message  
Received: Today  
Eugene Sepulveda Jr., MD [facility] Sepulveda Nurse  
Please let Dr. Cleveland   
office know that I would   
recommend following:  
If no contraindication   
would hold pts   
Lisinopril to see if   
symptoms improve.  
Could be a delayed   
reaction to ACE-I. Lots   
of wheezing in addition   
to  
swallowing complaints.  
Bebeto Rodriguez DO   
7/15/2024 6:45 AM Signed  
Please inform patient to   
hold the lisinopril 2.5   
mg a day tablet that she   
is  
taking. The Neurologist   
Dr. Sepulveda is concerned   
that her hoarseness is   
possibly  
related to a delayed   
reaction to this   
medication. Continue to   
monitor her  
BLOOD PRESSURE and goals   
should be <140/90  
DO Keshav Mena Beth, LPN   
7/15/2024 9:29 AM Signed  
Phoned patient left   
message to return call   
and ask to speak to a   
nurse.  
Dianna Yu LPN   
7/15/2024 4:42 PM Signed  
Spoke with pt gave   
information provided. Pt   
voices understanding.  
Allergies As of Date:   
2024 Noted Allergy   
Reaction  
ADHESIVE TAPE-SILICONES   
01/15/2020 2 - Rash  
DEMEROL (MEPERIDINE   
(PF)) 2012 11 -   
Vomiting  
Comments: migraine  
INFLUENZA VACCINE TRI-SP   
-10 2012 6 -   
Diarrhea  
Comments: vomiting/fever  
IODINE 2012 14 -   
Other: See Comments  
Comments: sick   
/headaches  
PENICILLINS 2012 4   
- Hives  
Comments: throat and   
mouth swelling  
SHELLFISH 2012 4 -   
Hives  
Date Reviewed:   
2024  
Reviewed by: Eugene Sepulveda Jr., MD -   
Fully Assessed  
Reason for Visit:  
medication information   
[Other]  
Prescriptions as of   
07/15/2024  
- albuterol (PROVENTIL)   
2.5 mg /3 mL (0.083 %)   
nebulizer solution  
Use 3 mL via nebulizer   
every 6 hours as needed.  
- nitroglycerin   
sublingual (NITROSTAT)   
0.4 mg SL tablet  
Dissolve 1 tablet under   
the tongue every 5   
minutes as needed for   
chest pain.  
Max of 3 doses. If no   
relief, call 911.  
- lisinopril (ZESTRIL) 5   
mg tablet  
Take 1 tablet by mouth   
once daily.  
- folic acid 0.8 mg cap  
Take 300 mg by mouth   
once daily.  
- vit B complex-C-folic   
ac-zinc 800 mcg- 12.5 mg   
tab  
Take by mouth.  
- levalbuterol tartrate   
HFA (XOPENEX HFA) 45   
mcg/actuation inhaler  
Inhale 2 Puffs as   
instructed every 6 hours   
as needed.  
- vit B complex   
no.12/niacin,B3,   
(VITAMIN B COMPLEX   
NO.12-NIACIN ORAL)  
Take by mouth.  
-   
TUMERIC-GING-OLIVE-OREG-  
CAPRYL ORAL  
Take by mouth.  
- Bisacodyl (DULCOLAX) 5   
mg tab  
Use as directed for   
Miralax / Gatorade Bowel   
Prep Kit  
- Nebulizers (AERONEB GO   
NEBULIZER)  
1 Each as directed.   
Portable Nebulizer with   
tubing, Dx: mild   
persistent asthma  
- Lactobac   
no.41/Bifidobact no.7   
(PROBIOTIC-10 ORAL)  
Take by mouth.  
- MULTIVITAMIN (VITAMIN   
A DAY ORAL)  
Take by mouth.  
- Cholecalciferol,   
Vitamin D3, 25 mcg   
(1,000 unit) cap  
Take 1,000 Units by   
mouth once daily.  
- Ascorbic Acid 1,000 mg   
tablet  
Take 1,000 mg by mouth   
once daily.  
- Aspirin 81 mg Tab  
Take 162 mg by mouth   
once daily.  
- GLUC COLBERT/CHONDRO COLBERT   
A/VIT C/MN (GLUCOSAMINE   
1500 COMPLEX ORAL)  
Take by mouth.  
- CALCIUM   
CARBONATE/VITAMIN D3   
(CALCIUM + D ORAL)  
Take by mouth.  
Problem List As Of Date   
2024 Noted   
Resolved  
Hyperlipidemia, mixed   
[E78.2] 01/15/2020  
Coronary artery disease   
involving native heart   
*01/15/2020  
Hypokalemia [E87.6]   
01/15/2020  
Asymptomatic   
postmenopausal status   
[Z78.0] 01/15/2020  
TIA (transient ischemic   
attack) [G45.9]   
01/15/2020  
Mild intermittent   
asthma, uncomplicated   
[J45.20]01/15/2020  
Essential hypertension   
[I10] 01/15/2020  
Palpitations [R00.2]   
2022  
Right shoulder injury,   
subsequent encounter   
[S4*2022  
Obesity, Class I, BMI   
30-34.9 [E66.9]   
2022  
Encounter Number:   
[id number]  
Encounter Status:Closed   
by DIANNA YU on   
7/15/24             Normal                                  [facility]  
   
                                                    Maggi 2024   
   
                                        CNPN                Telephone ([facility])  
------------------------  
--------  
TANNERELMIRA LOTT   
([id number]) 1945 F  
Date Time Provider   
Department  
24 BEBETO RODRIGUEZ  
During your visit today,   
we recorded the   
following information   
about you:  
Bebeto Rodriguez DO   
2024 4:53 PM Signed  
Please inform patient   
that her ECHO is overall   
normal, no concerns  
DO Lake Mena Rilee, MA   
2024 9:43 AM Signed  
Called and left message   
on patients voicemail to   
return call to the   
office and  
ask to speak with a    
triage nurse.  
JOSELYN Strange Beth, LPN   
2024 12:38 PM   
Signed  
Patient returned call   
and went over results,   
notes from Dr Rodriguez   
with  
understanding.  
Allergies As of Date:   
2024 Noted Allergy   
Reaction  
ADHESIVE TAPE-SILICONES   
01/15/2020 2 - Rash  
DEMEROL (MEPERIDINE   
(PF)) 2012 11 -   
Vomiting  
Comments: migraine  
INFLUENZA VACCINE TRI-SP   
-10 2012 6 -   
Diarrhea  
Comments: vomiting/fever  
IODINE 2012 14 -   
Other: See Comments  
Comments: sick   
/headaches  
PENICILLINS 2012 4   
- Hives  
Comments: throat and   
mouth swelling  
SHELLFISH 2012 4 -   
Hives  
Date Reviewed:   
2024  
Reviewed by:   
Janny Cristina LPN - Fully Assessed  
Reason for Visit:  
Results [95]  
Prescriptions as of   
2024  
- folic acid 0.8 mg cap  
Take 300 mg by mouth   
once daily.  
- vit B complex-C-folic   
ac-zinc 800 mcg- 12.5 mg   
tab  
Take by mouth.  
- albuterol (PROVENTIL)   
2.5 mg /3 mL (0.083 %)   
nebulizer solution  
Use 3 mL via nebulizer   
every 6 hours as needed.  
- levalbuterol tartrate   
HFA (XOPENEX HFA) 45   
mcg/actuation inhaler  
Inhale 2 Puffs as   
instructed every 6 hours   
as needed.  
- lisinopril (ZESTRIL) 5   
mg tablet  
Take 1 tablet by mouth   
once daily.  
- vit B complex   
no.12/niacin,B3,   
(VITAMIN B COMPLEX   
NO.12-NIACIN ORAL)  
Take by mouth.  
-   
TUMERIC-GING-OLIVE-OREG-  
CAPRYL ORAL  
Take by mouth.  
- Bisacodyl (DULCOLAX) 5   
mg tab  
Use as directed for   
Miralax / Gatorade Bowel   
Prep Kit  
- Nebulizers (AERONEB GO   
NEBULIZER)  
1 Each as directed.   
Portable Nebulizer with   
tubing, Dx: mild   
persistent asthma  
- Lactobac   
no.41/Bifidobact no.7   
(PROBIOTIC-10 ORAL)  
Take by mouth.  
- nitroglycerin   
sublingual (NITROSTAT)   
0.4 mg SL tablet  
Dissolve 1 tablet under   
the tongue every 5   
minutes as needed for   
Chest Pain.  
Max of 3 doses. If no   
relief, call 911.  
- MULTIVITAMIN (VITAMIN   
A DAY ORAL)  
Take by mouth.  
- Cholecalciferol,   
Vitamin D3, 25 mcg   
(1,000 unit) cap  
Take 1,000 Units by   
mouth once daily.  
- Ascorbic Acid 1,000 mg   
tablet  
Take 1,000 mg by mouth   
once daily.  
- Aspirin 81 mg Tab  
Take 162 mg by mouth   
once daily.  
- GLUC COLBERT/CHONDRO COLBERT   
A/VIT C/MN (GLUCOSAMINE   
1500 COMPLEX ORAL)  
Take by mouth.  
- CALCIUM   
CARBONATE/VITAMIN D3   
(CALCIUM + D ORAL)  
Take by mouth.  
Problem List As Of Date   
2024 Noted   
Resolved  
Hyperlipidemia, mixed   
[E78.2] 01/15/2020  
Coronary artery disease   
involving native heart   
*01/15/2020  
Hypokalemia [E87.6]   
01/15/2020  
Asymptomatic   
postmenopausal status   
[Z78.0] 01/15/2020  
TIA (transient ischemic   
attack) [G45.9]   
01/15/2020  
Mild intermittent   
asthma, uncomplicated   
[J45.20]01/15/2020  
Essential hypertension   
[I10] 01/15/2020  
Palpitations [R00.2]   
2022  
Right shoulder injury,   
subsequent encounter   
[S4*2022  
Obesity, Class I, BMI   
30-34.9 [E66.9]   
2022  
Encounter Number:   
[id number]  
Encounter Status:Closed   
by SEPIDEH PARR on   
24             Normal                                  [facility]  
   
                                                    ECHOon 2024   
   
                                        Echocardiography    Echocardiography Rep  
ort:   
Transthoracic Echo  
[facility]  
Date of service:   
2024 10:29:36 AM  
Accession #: [id number]  
  
Ordering physician:   
BEBETO RODRIGUEZ  
Indication: Shortness of   
Breath  
  
Technologist: Jeni Olson [facility]  
Interpreting physician:   
Chaparro Maldonado DO  
  
PATIENT:  
Name: MS. ELMIRA WARNER  
MRN: [id number]  
: 1945  
Age: 79 years  
Gender: F  
  
History of hypertension,   
dyslipidemia and   
coronary artery disease.  
Primary rhythm: sinus.  
Height: 160.00 cm BSA:   
1.86 m  
Weight: 77.56 kg BMI:   
30.3 kg/m  
  
  
Heart rate 87 bpm  
Blood pressure 157/78   
mmHg  
  
Color Doppler was   
utilized to interrogate   
the cardiac valves   
assessed and spectral   
Doppler was utilized to   
determine the flow   
velocities and pressure   
gradients reported in   
this exam. Myocardial   
strain analysis was   
performed in this exam   
to aid in the assessment   
of cardiac function.  
  
MEASUREMENTS:  
Value Indexed Normal  
Max aortic dimension 3.3   
cm Ao < 3.8  
Left atrial volume 47 ml   
(biplane A-L) 25 ml/m   
Francois <= 34  
LV ID (diastole) 4.5 cm   
(2D) 2.41 cm/m  
LV ID (systole) 3.3 cm   
(2D) 1.79 cm/m  
IVS, leaflet tips 1.0 cm   
(2D)  
Posterior wall thickness   
1.1 cm (2D)  
Left ventricular mass   
158 g (2D) 85 g/m  
Global peak long strain   
-17.3 %  
LV stroke volume 50 ml   
(2D biplane)  
LV end diastolic volume   
84 ml (2D biplane) 45.5   
ml/m 29<=EDVi<62  
LV end systolic volume   
34 ml (2D biplane) 18.5   
ml/m  
Ejection Fraction 59 %   
(2D biplane) EF > 54  
  
  
FINDINGS:  
  
LEFT VENTRICLE  
The left ventricle is   
normal in size.  
Left ventricular   
systolic function is   
normal. Global LV   
myocardial strain is   
normal.  
Grade I left ventricular   
diastolic dysfunction.  
Mitral annular lateral   
E/e': 8.2. Mitral   
annular septal E/e':   
13.1.  
Wall Motion:  
All scored segments are   
normal.  
  
  
  
RIGHT VENTRICLE  
The right ventricle is   
normal in size.  
Right ventricular   
systolic function is   
normal. RV systolic   
tissue Doppler velocity   
is 13.0 cm/s. Tricuspid   
annular displacement is   
1.8 cm.  
Estimated right   
ventricular systolic   
pressure is not reported   
due to an insufficient   
tricuspid regurgitation   
signal. Estimated right   
atrial pressure is 3   
mmHg (although IVC not   
seen).  
  
LEFT ATRIUM  
The left atrial cavity   
is normal in size.  
  
RIGHT ATRIUM  
The right atrial cavity   
is normal in size.  
Inferior Vena Cava:  
The inferior vena cava   
appears normal measuring   
1.9 cm.  
  
MITRAL VALVE  
The mitral valve   
leaflets are   
structurally normal.   
There is trace mitral   
valve regurgitation. The   
pressure half time is 66   
msec. The peak mitral   
E/A ratio is 0.54. The   
average mitral E/e'   
ratio is 10.7. The   
mitral flow deceleration   
time is 226 msec.  
  
TRICUSPID VALVE  
The tricuspid valve   
leaflets are   
structurally normal.   
There is trace tricuspid   
valve regurgitation.  
  
AORTIC VALVE  
The aortic valve cusps   
are structurally normal.   
There is no aortic valve   
regurgitation. Tricuspid   
aortic valve. The peak   
gradient is 12 mmHg   
(peak velocity = 175.5   
cm/s).  
  
PULMONIC VALVE  
The pulmonic valve cusps   
are structurally normal.   
There is trace (trace -   
1+) pulmonic valve   
regurgitation.  
  
AORTA  
The visualized aorta is   
normal in size.  
Measurements - Mid   
ascending aorta 3.3 cm.  
  
PERICARDIUM  
There is no pericardial   
effusion. There is an   
epicardial fat pad.  
  
CONCLUSIONS:  
- Exam indication:   
Shortness of Breath  
  
- The left ventricle is   
normal in size. Left   
ventricular systolic   
function is normal. EF =   
59 5% (2D biplane) Grade   
I left ventricular   
diastolic dysfunction.  
- The right ventricle is   
normal in size. Right   
ventricular systolic   
function is normal.  
- There are no   
significant valvular   
abnormalities.  
  
- The patient has not   
had a prior [facility]   
echocardiographic exam   
for comparison.  
  
  
Electronically signed by   
Chaparro Maldonado DO on   
2024 at 11:14:24 AM  
  
  
* * * Final * * *  
[employer]   
Medical Image :   
[id number]  
[id number]  
[id number]  
D                   Normal                                  [facility]  
   
                                                    Maggi 2024   
   
                                        JOHNATHAN                Telephone (FAMPWS)  
------------------------  
--------  
ELMIRA WARNER   
([id number]) 1945 F  
Date Time Provider   
Department  
24 SAMMY COATES  
During your visit today,   
we recorded the   
following information   
about you:  
Sammy Coates PA-C   
2024 2:47 PM Signed  
Please let patient know   
that her labs look good.   
Her calcium is back   
within  
normal range. The rest   
of her labs are normal.   
Kidney function is   
stable.  
Sammy Coates PA-C  
2024  
Shaniqua Michelle RN   
2024 3:34 PM Signed  
Pt called and is   
notified of providers   
results. Pt voices   
understanding.  
Shaniqua Michelle RN  
Allergies As of Date:   
2024 Noted Allergy   
Reaction  
ADHESIVE TAPE-SILICONES   
01/15/2020 2 - Rash  
DEMEROL (MEPERIDINE   
(PF)) 2012 11 -   
Vomiting  
Comments: migraine  
INFLUENZA VACCINE TRI-SP   
-10 2012 6 -   
Diarrhea  
Comments: vomiting/fever  
IODINE 2012 14 -   
Other: See Comments  
Comments: sick   
/headaches  
PENICILLINS 2012 4   
- Hives  
Comments: throat and   
mouth swelling  
SHELLFISH 2012 4 -   
Hives  
Date Reviewed:   
2024  
Reviewed by:   
Janny Cristina LPN - Fully Assessed  
Reason for Visit:  
Results [95]  
Prescriptions as of   
2024  
- folic acid 0.8 mg cap  
Take 300 mg by mouth   
once daily.  
- vit B complex-C-folic   
ac-zinc 800 mcg- 12.5 mg   
tab  
Take by mouth.  
- albuterol (PROVENTIL)   
2.5 mg /3 mL (0.083 %)   
nebulizer solution  
Use 3 mL via nebulizer   
every 6 hours as needed.  
- levalbuterol tartrate   
HFA (XOPENEX HFA) 45   
mcg/actuation inhaler  
Inhale 2 Puffs as   
instructed every 6 hours   
as needed.  
- lisinopril (ZESTRIL) 5   
mg tablet  
Take 1 tablet by mouth   
once daily.  
- vit B complex   
no.12/niacin,B3,   
(VITAMIN B COMPLEX   
NO.12-NIACIN ORAL)  
Take by mouth.  
-   
TUMERIC-GING-OLIVE-OREG-  
CAPRYL ORAL  
Take by mouth.  
- Bisacodyl (DULCOLAX) 5   
mg tab  
Use as directed for   
Miralax / Gatorade Bowel   
Prep Kit  
- Nebulizers (AERONEB GO   
NEBULIZER)  
1 Each as directed.   
Portable Nebulizer with   
tubing, Dx: mild   
persistent asthma  
- Lactobac   
no.41/Bifidobact no.7   
(PROBIOTIC-10 ORAL)  
Take by mouth.  
- nitroglycerin   
sublingual (NITROSTAT)   
0.4 mg SL tablet  
Dissolve 1 tablet under   
the tongue every 5   
minutes as needed for   
Chest Pain.  
Max of 3 doses. If no   
relief, call 911.  
- MULTIVITAMIN (VITAMIN   
A DAY ORAL)  
Take by mouth.  
- Cholecalciferol,   
Vitamin D3, 25 mcg   
(1,000 unit) cap  
Take 1,000 Units by   
mouth once daily.  
- Ascorbic Acid 1,000 mg   
tablet  
Take 1,000 mg by mouth   
once daily.  
- Aspirin 81 mg Tab  
Take 162 mg by mouth   
once daily.  
- GLUC COLBERT/CHONDRO COLBERT   
A/VIT C/MN (GLUCOSAMINE   
1500 COMPLEX ORAL)  
Take by mouth.  
- CALCIUM   
CARBONATE/VITAMIN D3   
(CALCIUM + D ORAL)  
Take by mouth.  
Problem List As Of Date   
2024 Noted   
Resolved  
Hyperlipidemia, mixed   
[E78.2] 01/15/2020  
Coronary artery disease   
involving native heart   
*01/15/2020  
Hypokalemia [E87.6]   
01/15/2020  
Asymptomatic   
postmenopausal status   
[Z78.0] 01/15/2020  
TIA (transient ischemic   
attack) [G45.9]   
01/15/2020  
Mild intermittent   
asthma, uncomplicated   
[J45.20]01/15/2020  
Essential hypertension   
[I10] 01/15/2020  
Palpitations [R00.2]   
2022  
Right shoulder injury,   
subsequent encounter   
[S4*2022  
Obesity, Class I, BMI   
30-34.9 [E66.9]   
2022  
Encounter Number:   
[id number]  
Encounter Status:Closed   
by SHANIQUA MICHELLE on   
24              [facility]  
   
                                                    [place] 2024   
   
                                        CNPN                Telephone (FAMPWS)  
------------------------  
--------  
ELMIRA WARNER   
([id number]) 1945 F  
Date Time Provider   
Department  
5/3/24 BEBETO RODRIGUEZWS  
During your visit today,   
we recorded the   
following information   
about you:  
Bebeto Rodriguez DO   
5/3/2024 5:40 PM Signed  
Please inform patient   
that her CXR is normal  
DO Keshav Mena Beth, LPN   
2024 11:24 AM Signed  
Phoned patient and went   
over results from Dr Rodriguez with   
understanding.  
Allergies As of Date:   
2024 Noted Allergy   
Reaction  
ADHESIVE TAPE-SILICONES   
01/15/2020 2 - Rash  
DEMEROL (MEPERIDINE   
(PF)) 2012 11 -   
Vomiting  
Comments: migraine  
INFLUENZA VACCINE TRI-SP   
-10 2012 6 -   
Diarrhea  
Comments: vomiting/fever  
IODINE 2012 14 -   
Other: See Comments  
Comments: sick   
/headaches  
PENICILLINS 2012 4   
- Hives  
Comments: throat and   
mouth swelling  
SHELLFISH 2012 4 -   
Hives  
Date Reviewed:   
2024  
Reviewed by:   
Janny Cristina LPN - Fully Assessed  
Reason for Visit:  
Results [95]  
Prescriptions as of   
2024  
- folic acid 0.8 mg cap  
Take 300 mg by mouth   
once daily.  
- vit B complex-C-folic   
ac-zinc 800 mcg- 12.5 mg   
tab  
Take by mouth.  
- albuterol (PROVENTIL)   
2.5 mg /3 mL (0.083 %)   
nebulizer solution  
Use 3 mL via nebulizer   
every 6 hours as needed.  
- levalbuterol tartrate   
HFA (XOPENEX HFA) 45   
mcg/actuation inhaler  
Inhale 2 Puffs as   
instructed every 6 hours   
as needed.  
- lisinopril (ZESTRIL) 5   
mg tablet  
Take 1 tablet by mouth   
once daily.  
- vit B complex   
no.12/niacin,B3,   
(VITAMIN B COMPLEX   
NO.12-NIACIN ORAL)  
Take by mouth.  
-   
TUMERIC-GING-OLIVE-OREG-  
CAPRYL ORAL  
Take by mouth.  
- Bisacodyl (DULCOLAX) 5   
mg tab  
Use as directed for   
Miralax / Gatorade Bowel   
Prep Kit  
- Nebulizers (AERONEB GO   
NEBULIZER)  
1 Each as directed.   
Portable Nebulizer with   
tubing, Dx: mild   
persistent asthma  
- Lactobac   
no.41/Bifidobact no.7   
(PROBIOTIC-10 ORAL)  
Take by mouth.  
- nitroglycerin   
sublingual (NITROSTAT)   
0.4 mg SL tablet  
Dissolve 1 tablet under   
the tongue every 5   
minutes as needed for   
Chest Pain.  
Max of 3 doses. If no   
relief, call 911.  
- MULTIVITAMIN (VITAMIN   
A DAY ORAL)  
Take by mouth.  
- Cholecalciferol,   
Vitamin D3, 25 mcg   
(1,000 unit) cap  
Take 1,000 Units by   
mouth once daily.  
- Ascorbic Acid 1,000 mg   
tablet  
Take 1,000 mg by mouth   
once daily.  
- Aspirin 81 mg Tab  
Take 162 mg by mouth   
once daily.  
- GLUC COLBERT/CHONDRO COLBERT   
A/VIT C/MN (GLUCOSAMINE   
1500 COMPLEX ORAL)  
Take by mouth.  
- CALCIUM   
CARBONATE/VITAMIN D3   
(CALCIUM + D ORAL)  
Take by mouth.  
Problem List As Of Date   
2024 Noted   
Resolved  
Hyperlipidemia, mixed   
[E78.2] 01/15/2020  
Coronary artery disease   
involving native heart   
*01/15/2020  
Hypokalemia [E87.6]   
01/15/2020  
Asymptomatic   
postmenopausal status   
[Z78.0] 01/15/2020  
TIA (transient ischemic   
attack) [G45.9]   
01/15/2020  
Mild intermittent   
asthma, uncomplicated   
[J45.20]01/15/2020  
Essential hypertension   
[I10] 01/15/2020  
Palpitations [R00.2]   
2022  
Right shoulder injury,   
subsequent encounter   
[S4*2022  
Obesity, Class I, BMI   
30-34.9 [E66.9]   
2022  
Encounter Number:   
[id number]  
Encounter Status:Closed   
by SEPIDEH PARR on   
24              Normal                                  [facility]  
   
                                                    No Panel Informationon    
   
                                                    Interpretation and   
review of laboratory   
results         Normal                                          [facility]  
   
                                                    T4 FREE/FREE THYROXINEon    
   
                          Free T4 [Mass/Vol] 1.1 ng/dL                 0.9 - 1.7  
   
ng/dL                                   [facility]  
   
                                                    THYROID STIMULATING HORMONEo  
n 2024   
   
                      TSH Qn     2.540 m[IU]/L                       [facility]  
   
                                                    CNOVon 2024   
   
                                        CNOV                Office Visit (FAMPWS  
)  
------------------------  
--------  
ELMIRA WARNER   
([id number]) 1945 F  
Date Time Provider   
Department  
24 12:00 PM   
BEBETO RODRIGUEZ   
FAMPWS  
During your visit today,   
we recorded the   
following information   
about you:  
Temperature Pulse   
Respiration Blood   
pressure  
98.1 degrees 80/minute   
16/minute 150/70  
Weight  
77.6 kg  
Bebeto Rodriguez DO   
2024 12:39 PM Signed  
DO Michael Mena Jordan L, DO   
2024 12:39 PM Signed  
CC: Elmira Warner is   
a 79 year old female who   
presents to the office   
to  
establish care.  
HPI:  
CAD, seeing DR. Carmen   
for cardiologist, last   
nuclear stress testing   
 and  
stable. Has hx of 7   
stents coronary   
arteries. Does get some   
intermittent cough  
and dyspnea and chest   
pressure- not always   
exertional. No syncope   
or  
palpitations etc.  
HPL, diet controlled,   
she refuses statin   
therapy  
HTN, taking lisinopril.   
Not interested in   
increasing the dose. She   
tolerates  
well and states BLOOD   
PRESSURE at home   
130s/80s consistently  
Arthritis, stable,   
chronic, no falls.   
Refuses to take NSAIDs  
Asthma, chronic   
intermittent cough, no   
hemoptysis or distress  
Hoarseness, coordination   
of swallowing issues.   
Has had chronically. Has   
been  
seen by ENT Dr. Kumari   
whom recommended that   
she sees Neurologist for  
additional testing  
PAST MEDICAL HISTORY  
Diagnosis Date  
Abdominal pain, right   
lower quadrant  
Abdominal pain, right   
upper quadrant  
Cerebrovascular disease,   
unspecified  
Coronary atherosclerosis   
of unspecified type of   
vessel, native or graft  
Diverticulosis of colon   
(without mention of   
hemorrhage)  
Essential hypertension,   
benign  
Intrinsic asthma with   
status asthmaticus  
Mixed hyperlipidemia  
Osteoporosis,   
unspecified  
Thoracic or lumbosacral   
neuritis or radiculitis,   
unspecified  
PAST SURGICAL HISTORY  
Procedure Laterality   
Date  
ANGIOPLASTY times 3  
7 stents  
APPENDECTOMY  
COLONOSCOPY FLX DX   
W/COLLJ SPEC WHEN PFRMD   
12 years ago  
Colonoscopy  
COLONOSCOPY FLX DX   
W/COLLJ SPEC WHEN PFRMD   
2012  
Colonoscopy  
HYSTERECTOMY HX  
PAST SURGICAL HISTORY OF   
  
cervical spinal fusion  
Social History:  
Social History  
Tobacco Use  
Smoking status: Never  
Smokeless tobacco: Never  
Vaping Use  
Vaping Use: Never used  
Substance Use Topics  
Alcohol use: Never  
Drug use: No  
FAMILY HISTORY  
Problem Relation Age of   
Onset  
Stroke Father 52  
heart dx,osteoarthritis  
other (rheumatic fever)   
Sister  
other (rheumatic fever)   
Sister  
other (heart disease)   
Sister  
other (heart disease)   
Brother  
Current Outpatient   
prescriptions:  
folic acid 0.8 mg cap   
Take 300 mg by mouth   
once daily.  
vit B complex-C-folic   
ac-zinc 800 mcg- 12.5 mg   
tab Take by mouth.  
albuterol (PROVENTIL)   
2.5 mg /3 mL (0.083 %)   
nebulizer solution Use 3   
mL via  
nebulizer every 6 hours   
as needed.  
levalbuterol tartrate   
HFA (XOPENEX HFA) 45   
mcg/actuation inhaler   
Inhale 2  
Puffs as instructed   
every 6 hours as needed.  
lisinopril (ZESTRIL) 5   
mg tablet Take 1 tablet   
by mouth once daily.  
vit B complex   
no.12/niacin,B3,   
(VITAMIN B COMPLEX   
NO.12-NIACIN ORAL) Take   
by  
mouth.  
TUMERIC-GING-OLIVE-OREG-  
CAPRYL ORAL Take by   
mouth.  
Bisacodyl (DULCOLAX) 5   
mg tab Use as directed   
for Miralax / Gatorade   
Bowel  
Prep Kit  
Nebulizers (AERONEB GO   
NEBULIZER) 1 Each as   
directed. Portable   
Nebulizer with  
tubing, Dx: mild   
persistent asthma  
Lactobac   
no.41/Bifidobact no.7   
(PROBIOTIC-10 ORAL) Take   
by mouth.  
nitroglycerin sublingual   
(NITROSTAT) 0.4 mg SL   
tablet Dissolve 1 tablet   
under  
the tongue every 5   
minutes as needed for   
Chest Pain. Max of 3   
doses. If no  
relief, call 911.  
MULTIVITAMIN (VITAMIN A   
DAY ORAL) Take by mouth.  
Cholecalciferol, Vitamin   
D3, 25 mcg (1,000 unit)   
cap Take 1,000 Units by   
mouth  
once daily.  
Ascorbic Acid 1,000 mg   
tablet Take 1,000 mg by   
mouth once daily.  
Aspirin 81 mg Tab Take   
162 mg by mouth once   
daily.  
GLUC COLBERT/CHONDRO COLBERT A/VIT   
C/MN (GLUCOSAMINE 1500   
COMPLEX ORAL) Take by   
mouth.  
CALCIUM   
CARBONATE/VITAMIN D3   
(CALCIUM + D ORAL) Take   
by mouth.  
Allergies:  
ALLERGIES  
Allergen Reactions  
Adhesive Tape-Silic*   
Rash  
Demerol [Meperidine*   
Vomiting  
migraine  
Influenza Vaccine T*   
Diarrhea  
vomiting/fever  
Iodine Other: See   
Comments  
sick /headaches  
Penicillins Hives  
throat and mouth   
swelling  
Shellfish Hives  
ROS:  
See HPI  
PE:  
24  
1118  
BP: 162/74  
Pulse: 80  
Resp: 16  
Temp: 36.7 ?C (98.1 ?F)  
TempSrc: Left Tympanic  
Weight: 77.6 kg (171 lb)  
Gen: AANDO, NAD,   
non-toxic appearing,   
Pleasant, cooperative  
HEENT: NT/AC, PERRLA,   
EOMs intact b/l, nares   
clear and patent b/l,   
pharynx  
without erythema,   
exudate or lesions.   
Uvula midline. EACs   
without erythema or  
debris. TMs pearly grey   
with intact landmarks   
b/l. hoarseness  
Neck: supple, No   
cervical LAD, no   
thyromegaly, no carotid   
bruits  
CV: RRR, normal S1 and   
S2, no murmurs, no   
gallops, no rubs, Pulses   
2+ and  
symmetric in UE and LE   
b/l  
Lungs: normal   
respiratory effort, CTA   
b/l, (more content not   
included)...        Normal                                  [facility]  
   
                                                    Comprehensive metabolic 2000  
 panelOrdered By: Teresa Cornejo on 2024   
   
                          Albumin [Mass/Vol] 4.2 g/dL                  3.9 - 4.9  
   
g/dL                                    [facility]  
   
                                                    ALP [Catalytic   
activity/Vol]       91 U/L                                  34 - 123   
U/L                                     [facility]  
   
                                                    ALT [Catalytic   
activity/Vol]   10 U/L                          7 - 38 U/L      [facility]  
   
                          Anion gap [Moles/Vol] 9 mmol/L                  9 - 18  
   
mmol/L                                  [facility]  
   
                                                    AST [Catalytic   
activity/Vol]   23 U/L                          13 - 35 U/L     [facility]  
   
                          Bilirubin [Mass/Vol] 0.3 mg/dL                 0.2 - 1  
.3   
mg/dL                                   [facility]  
   
                          Calcium [Mass/Vol] 10.2 mg/dL                8.5 - 10.  
2   
mg/dL                                   [facility]  
   
                          Chloride [Moles/Vol] 103 mmol/L                97 - 10  
5   
mmol/L                                  [facility]  
   
                          CO2 [Moles/Vol] 25 mmol/L                 22 - 30   
mmol/L                                  [facility]  
   
                          Creatinine [Mass/Vol] 1.01 mg/dL   High         0.58 -  
 0.96   
mg/dL                                   [facility]  
   
                                                    GFR/1.73 sq   
M.predicted among   
non-blacks MDRD   
(S/P/Bld) [Vol   
rate/Area]      56 mL/min/{1.73_m2} Low             - PINF          [facility]  
   
                                        Comment on above:   Estimated Glomerular  
 Filtration Rate (eGFR) is calculated   
using   
the  CKD-EPI creatinine equation. This equation utilizes   
serum creatinine, sex, and age as parameters. The creatinine   
assay has traceable calibration to isotope dilution-mass   
spectrometry. Refer to KDIGO guidelines for clinical   
interpretation. In patients with unstable renal function, e.g.   
those with acute kidney injury, the eGFR may not accurately   
reflect actual GFR.   
   
                          Glucose [Mass/Vol] 95 mg/dL                  74 - 99   
mg/dL                                   [facility]  
   
                                        Comment on above:   The American Diabete  
s Association (ADA) provides guidance for   
cutoff values for fasting glucose and random glucose. The ADA   
defines fasting as no caloric intake for at least 8 hours.   
Fasting plasma glucose results between 100 to 125 mg/dL   
indicate increased risk for diabetes (prediabetes).  
Fasting plasma glucose results greater than or equal to 126   
mg/dL meet the criteria for diagnosis of diabetes. In the   
absence of unequivocal hyperglycemia, results should be   
confirmed by repeat testing. In a patient with classic symptoms   
of hyperglycemia or hyperglycemic crisis, random plasma glucose   
results greater than or equal to 200 mg/dL meet the criteria   
for diagnosis of diabetes.  
Reference: Standards of Medical Care in Diabetes 2016, American   
Diabetes Association. Diabetes Care. 2016.39(Suppl 1).  
  
   
   
                                                    Interpretation and   
review of laboratory   
results         Abnormal                                        [facility]  
   
                          Potassium [Moles/Vol] 4.1 mmol/L                3.7 -   
5.1   
mmol/L                                  [facility]  
   
                          Protein [Mass/Vol] 8.0 g/dL                  6.3 - 8.0  
   
g/dL                                    [facility]  
   
                          Sodium [Moles/Vol] 137 mmol/L                136 - 144  
   
mmol/L                                  [facility]  
   
                                                    Urea nitrogen   
[Mass/Vol]          15 mg/dL                                7 - 21   
mg/dL                                   [facility]  
   
                                                    Comprehensive metabolic 2000  
 panelon 2024   
   
                      Albumin [Mass/Vol] 4.2 g/dL   Normal     3.9-4.9    [facility]  
   
                                        Comment on above:   Order Comment: Speci  
men Type: BLOOD SPECIMEN  
Ordering Facility: [facility]  
Address: [address] [id number]   
   
                                                            Performed By: #### 2  
4323-8, 2-0 ####  
[facility]  
CLIA [id number]  
[address] [place] [address] [place]   
   
                                                    ALP [Catalytic   
activity/Vol]   91 U/L          Normal                    [facility]  
   
                                        Comment on above:   Order Comment: Speci  
men Type: BLOOD SPECIMEN  
Ordering Facility: [facility]  
Address: [address] [place] [address]   
   
                                                            Performed By: #### 2  
4323-8, 2-0 ####  
[facility]  
CLIA [id number]  
[address] [place] [address] [place]   
   
                                                    ALT [Catalytic   
activity/Vol]   10 U/L          Normal          7-38            [facility]  
   
                                        Comment on above:   Order Comment: Speci  
men Type: BLOOD SPECIMEN  
Ordering Facility: [facility]  
Address: [address] [place] [address]   
   
                                                            Performed By: #### 2  
4323-8, 2-0 ####  
[facility]  
CLIA [id number]  
[address] [place] [address] [place]   
   
                      Anion gap [Moles/Vol] 9 mmol/L   Normal     9-18       [facility]  
   
                                        Comment on above:   Order Comment: Speci  
men Type: BLOOD SPECIMEN  
Ordering Facility: [facility]  
Address: [address] [place] [address]   
   
                                                            Performed By: #### 2  
4323-8, 2532-0 ####  
[facility]  
CLIA [id number]  
[address] [place] [address] [place]   
   
                                                    AST [Catalytic   
activity/Vol]   23 U/L          Normal          13-35           [facility]  
   
                                        Comment on above:   Order Comment: Speci  
men Type: BLOOD SPECIMEN  
Ordering Facility: [facility]  
Address: [address] [place], [place] [id number]   
   
                                                            Performed By: #### 2  
4323-8, 2532-0 ####  
[facility]  
CLIA [id number]  
[address] [place] [address] [place]   
   
                      Bilirubin [Mass/Vol] 0.3 mg/dL  Normal     0.2-1.3    [facility]  
   
                                        Comment on above:   Order Comment: Speci  
men Type: BLOOD SPECIMEN  
Ordering Facility: [facility]  
Address: [address] [place] [address]   
   
                                                            Performed By: #### 2  
4323-8, -0 ####  
[facility]  
CLIA [id number]  
[address] [place] [address] [place]   
   
                      Calcium [Mass/Vol] 10.2 mg/dL Normal     8.5-10.2   [facility]  
   
                                        Comment on above:   Order Comment: Speci  
men Type: BLOOD SPECIMEN  
Ordering Facility: [facility]  
Address: [address] [place] [address]   
   
                                                            Performed By: #### 2  
4323-8, -0 ####  
[facility]  
CLIA [id number]  
[address] [place] [address] [place]   
   
                      Chloride [Moles/Vol] 103 mmol/L Normal          [facility]  
   
                                        Comment on above:   Order Comment: Speci  
men Type: BLOOD SPECIMEN  
Ordering Facility: [facility]  
Address: [address] [place] [address]   
   
                                                            Performed By: #### 2  
4323-8, -0 ####  
[facility]  
CLIA [id number]  
[address] [place] [address] [place]   
   
                      CO2 [Moles/Vol] 25 mmol/L  Normal     22-30      [facility]  
   
                                        Comment on above:   Order Comment: Speci  
men Type: BLOOD SPECIMEN  
Ordering Facility: [facility]  
Address: [address] [place] [address]   
   
                                                            Performed By: #### 2  
4323-8, 2532-0 ####  
[facility]  
CLIA [id number]  
[address] [place] [address] [place]   
   
                      Creatinine [Mass/Vol] 1.01 mg/dL High       0.58-0.96  [facility]  
   
                                        Comment on above:   Order Comment: Speci  
men Type: BLOOD SPECIMEN  
Ordering Facility: [facility]  
Address: [address] [place] [address]   
   
                                                            Performed By: #### 2  
4323-8, 0 ####  
[facility]  
CLIA [id number]  
[address] [place] [address] [place]   
   
                                                    Creatinine and   
Glomerular filtration   
rate.predicted panel   
(S/P/Bld)       56 mL/min/1.73m??? Low             >=60            [facility]  
   
                                        Comment on above:   Order Comment: Speci  
men Type: BLOOD SPECIMEN  
Ordering Facility: [facility]  
Address: [address] [place] [address]   
   
                                                            Result Comment: Jenna  
mated Glomerular Filtration Rate (eGFR) is   
calculated using the  CKD-EPI creatinine equation. This   
equation utilizes serum creatinine, sex, and age as parameters.   
The creatinine assay has traceable calibration to isotope   
dilution-mass spectrometry. Refer to KDIGO guidelines for   
clinical interpretation. In patients with unstable renal   
function, e.g. those with acute kidney injury, the eGFR may not   
accurately reflect actual GFR.   
   
                                                            Performed By: #### 2  
4323-8, 0 ####  
[facility]  
CLIA [id number]  
[address] [place] [address] [place]   
   
                      Glucose [Mass/Vol] 95 mg/dL   Normal     74-99      [facility]  
   
                                        Comment on above:   Order Comment: Speci  
men Type: BLOOD SPECIMEN  
Ordering Facility: [facility]  
Address: [address] [place] [address]   
   
                                                            Result Comment: The   
American Diabetes Association (ADA)   
provides guidance for cutoff values for fasting glucose and   
random glucose. The ADA defines fasting as no caloric intake   
for at least 8 hours. Fasting plasma glucose results between   
100 to 125 mg/dL indicate increased risk for diabetes   
(prediabetes).  
Fasting plasma glucose results greater than or equal to 126   
mg/dL meet the criteria for diagnosis of diabetes. In the   
absence of unequivocal hyperglycemia, results should be   
confirmed by repeat testing. In a patient with classic symptoms   
of hyperglycemia or hyperglycemic crisis, random plasma glucose   
results greater than or equal to 200 mg/dL meet the criteria   
for diagnosis of diabetes.  
Reference: Standards of Medical Care in Diabetes 2016, American   
Diabetes Association. Diabetes Care. 2016.39(Suppl 1).   
   
                                                            Performed By: #### 2  
4323-8, 0 ####  
[facility]  
CLIA [id number]  
[address] [place] [address] [place]   
   
                      Potassium [Moles/Vol] 4.1 mmol/L Normal     3.7-5.1    [facility]  
   
                                        Comment on above:   Order Comment: Speci  
men Type: BLOOD SPECIMEN  
Ordering Facility: [facility]  
Address: [address] [place] [address]   
   
                                                            Performed By: #### 2  
4323-8,  ####  
[facility]  
CLIA [id number]  
[address] [place] [address] [place]   
   
                      Protein [Mass/Vol] 8.0 g/dL   Normal     6.3-8.0    [facility]  
   
                                        Comment on above:   Order Comment: Speci  
men Type: BLOOD SPECIMEN  
Ordering Facility: [facility]  
Address: [address] [place] [address]   
   
                                                            Performed By: #### 2  
4323-8,  ####  
[facility]  
CLIA [id number]  
[address] [place] [address] [place]   
   
                      Sodium [Moles/Vol] 137 mmol/L Normal     136-144    [facility]  
   
                                        Comment on above:   Order Comment: Speci  
men Type: BLOOD SPECIMEN  
Ordering Facility: [facility]  
Address: [address] [place] [address]   
   
                                                            Performed By: #### 2  
4323-8, 0 ####  
[facility]  
CLIA [id number]  
[address] [place] [address] [place]   
   
                                                    Urea nitrogen   
[Mass/Vol]      15 mg/dL        Normal          7-21            [facility]  
   
                                        Comment on above:   Order Comment: Speci  
men Type: BLOOD SPECIMEN  
Ordering Facility: [facility]  
Address: [address] [place] [address]   
   
                                                            Performed By: #### 2  
4323-8, -0 ####  
[facility]  
CLIA [id number]  
[address] [place] [address] OF [facility]   
   
                                                    LACTATE DEHYDROGENASEOrdered  
 By: Anahy Yanez on 2024   
   
                                                    LDH [Catalytic   
activity/Vol]       213 U/L                                 135 - 214   
U/L                                     [facility]  
   
                                                    LDH SerPl-cCncon 2024   
   
                                                    LDH [Catalytic   
activity/Vol]   213 U/L         Normal          135-214         [facility]  
   
                                        Comment on above:   Order Comment: Speci  
men Type: BLOOD SPECIMEN  
Ordering Facility: [facility]  
Address: [address] [place] [address]   
   
                                                            Performed By: #### 2  
43238, 0 ####  
[facility]  
CLIA [id number]  
[address] [place] [address] [place]   
   
                                                    LDH [Catalytic activity/Vol]  
Ordered By: Anahy Yanez on 2024   
   
                                                    Interpretation and   
review of laboratory   
results         Normal                                          [facility]  
   
                                                    PTH INTACTon 2024   
   
                                                    Parathyrin.intact   
[Mass/Vol]          45 pg/mL                                15 - 65   
pg/mL                                   [facility]  
   
                                                    PTH-Intact SerPl-ncon    
   
                                                    Parathyrin.intact   
[Mass/Vol]      45 pg/mL        Normal          15-65           [facility]  
   
                                        Comment on above:   Order Comment: Speci  
men Type: BLOOD SPECIMEN  
Ordering Facility: [facility]  
Address: [address] [place] [address]   
   
                                                            Performed By: #### 2  
4323-8, 0 ####  
[facility]  
CLIA [id number]  
[address] [place] [address] [place]   
   
                                                    Parathyrin.intact [Mass/Vol]  
on 2024   
   
                                                    Interpretation and   
review of laboratory   
results         Normal                                          [facility]  
   
                                                    T4 Free SerPl-mCncon   
024   
   
                      Free T4 [Mass/Vol] 1.1 ng/dL  Normal     0.9-1.7    [facility]  
   
                                        Comment on above:   Order Comment: Speci  
men Type: BLOOD SPECIMEN  
Ordering Facility: [facility]  
Address: [address] [id number]   
   
                                                            Performed By: #### 2  
4323-8, 2532-0 ####  
[id number]  
[address] [place] [address] OF [facility]   
   
                                                    TSH SerPl-aCncon 2024   
   
                      TSH Qn     2.540 m[IU]/L Normal     0.270-4.200 [facility]  
   
                                        Comment on above:   Order Comment: Speci  
men Type: BLOOD SPECIMEN  
Ordering Facility: [facility]  
Address: [address] [id number]   
   
                                                            Performed By: #### 2  
4323-8, 2532-0 ####  
[facility] [id number]  
[address] [place] [address] OF [facility]   
   
                                                    XR CHEST 2V FRONTAL/LATon    
   
                                                    XR CHEST 2V   
FRONTAL/LAT                             * * *Final Report* * *  
DATE OF EXAM: May 1 2024   
12:23PM  
WRX [id number] - XR CHEST 2V   
FRONTAL/LAT / ACCESSION   
# [id number]  
PROCEDURE REASON:   
multiple diagnoses  
  
* * * * Physician   
Interpretation * * * *  
EXAMINATION: CHEST   
RADIOGRAPH (2 VIEW   
FRONTAL and LATERAL)  
CLINICAL HISTORY:   
Chronic cough SOB   
(shortness of breath)  
MQ: XC2_6  
EXAM DATE/TIME: 2024   
12:23 PM  
COMPARISON: No relevant   
prior studies available.  
RESULT:  
Lines, tubes, and   
devices: None.  
Lungs and pleura: No   
consolidation. No lung   
mass. No pleural   
effusion.  
No pneumothorax.  
Cardiomediastinal   
silhouette: The cardiac   
silhouette is and the  
mediastinal contour are   
within normal limits.   
There is a coronary   
stent  
in place.  
Bones and soft tissues:   
Status post cervical   
spinal fusion.  
IMPRESSION:  
No acute radiographic   
abnormality.  
: PSCB  
Transcribe Date/Time:   
May 2 2024 10:43A  
Dictated by : ELISSA MELARA MD  
This examination was   
interpreted and the   
report reviewed and  
electronically signed   
by:  
ELISSA MELARA MD on May 2   
2024 10:43AM EST  
[id number] Normal                                  [facility]  
   
                                                    CNOVon 2024   
   
                                        CNOV                Office Visit (CAWSTR  
)  
------------------------  
--------  
ELMIRA WARNER   
([id number]) 1945 F  
Date Time Provider   
Department  
24 2:20 PM KINJAL CARMEN CAWSTR  
During your visit today,   
we recorded the   
following information   
about you:  
Pulse Blood pressure   
Weight  
105/minute 152/94 74.8   
kg  
Kinjal Carmen MD   
2024 3:29 PM Signed  
Kinjal Carmen MD  
Interventional   
Cardiology  
[address] [place] [address]  
Chief Complaint  
Patient presents with:  
yearly check  
HISTORY OF PRESENT   
ILLNESS:  
Ms. Warner is a 78   
year old female seen in   
my office for follow-up   
prior  
history of coronary   
artery disease with   
angioplasty of the LAD   
and the right  
coronary artery   
drug-eluting stent   
patient is doing well   
from a cardiac point  
of view asymptomatic   
denies chest pain or   
shortness of breath   
prior history of  
asthma hypertensive   
heart disease and   
hyperlipidemia  
Cardiac Risk Factors  
age (male over 45,   
female over 55),   
hyperlipidemia,   
hypertension, family  
history of CAD  
PAST MEDICAL HISTORY  
Diagnosis Date  
Abdominal pain, right   
lower quadrant  
Abdominal pain, right   
upper quadrant  
Cerebrovascular disease,   
unspecified  
Coronary atherosclerosis   
of unspecified type of   
vessel, native or graft  
Diverticulosis of colon   
(without mention of   
hemorrhage)  
Essential hypertension,   
benign  
Intrinsic asthma with   
status asthmaticus  
Mixed hyperlipidemia  
Osteoporosis,   
unspecified  
Thoracic or lumbosacral   
neuritis or radiculitis,   
unspecified  
PAST SURGICAL HISTORY  
Procedure Laterality   
Date  
ANGIOPLASTY times 3  
7 stents  
APPENDECTOMY  
COLONOSCOPY FLX DX   
W/COLLJ SPEC WHEN PFRMD   
12 years ago  
Colonoscopy  
COLONOSCOPY FLX DX   
W/COLLJ SPEC WHEN PFRMD   
2012  
Colonoscopy  
HYSTERECTOMY HX  
PAST SURGICAL HISTORY OF   
  
cervical spinal fusion  
FAMILY HISTORY  
Problem Relation Age of   
Onset  
Stroke Father 52  
heart dx,osteoarthritis  
other (rheumatic fever)   
Sister  
other (rheumatic fever)   
Sister  
other (heart disease)   
Sister  
other (heart disease)   
Brother  
Social History  
Tobacco Use  
Smoking status: Never  
Smokeless tobacco: Never  
Vaping Use  
Vaping Use: Never used  
Substance Use Topics  
Alcohol use: Never  
Drug use: No  
ALLERGIES  
Allergen Reactions  
Adhesive Tape-Silic*   
Rash  
Demerol [Meperidine*   
Vomiting  
migraine  
Influenza Vaccine T*   
Diarrhea  
vomiting/fever  
Iodine Other: See   
Comments  
sick /headaches  
Penicillins Hives  
throat and mouth   
swelling  
Shellfish Hives  
Medications:  
Current Outpatient   
Medications  
Medication Sig Dispense   
Refill  
folic acid 0.8 mg cap   
Take 300 mg by mouth   
once daily.  
vit B complex-C-folic   
ac-zinc 800 mcg- 12.5 mg   
tab Take by mouth.  
albuterol (PROVENTIL)   
2.5 mg /3 mL (0.083 %)   
nebulizer solution Use 3   
mL via  
nebulizer every 6 hours   
as needed. 30 mL 0  
levalbuterol tartrate   
HFA (XOPENEX HFA) 45   
mcg/actuation inhaler   
Inhale 2  
Puffs as instructed   
every 6 hours as needed.   
1 Each 1  
lisinopril (ZESTRIL) 5   
mg tablet Take 1 tablet   
by mouth once daily. 90   
tablet  
1  
vit B complex   
no.12/niacin,B3,   
(VITAMIN B COMPLEX   
NO.12-NIACIN ORAL) Take   
by  
mouth.  
TUMERIC-GING-OLIVE-OREG-  
CAPRYL ORAL Take by   
mouth.  
Bisacodyl (DULCOLAX) 5   
mg tab Use as directed   
for Miralax / Gatorade   
Bowel  
Prep Kit 4 tablet 0  
Nebulizers (AERONEB GO   
NEBULIZER) 1 Each as   
directed. Portable   
Nebulizer with  
tubing, Dx: mild   
persistent asthma 1 Each   
0  
Lactobac   
no.41/Bifidobact no.7   
(PROBIOTIC-10 ORAL) Take   
by mouth.  
nitroglycerin sublingual   
(NITROSTAT) 0.4 mg SL   
tablet Dissolve 1 tablet   
under  
the tongue every 5   
minutes as needed for   
Chest Pain. Max of 3   
doses. If no  
relief, call 911. 1   
Bottle of 25 1  
MULTIVITAMIN (VITAMIN A   
DAY ORAL) Take by mouth.  
Cholecalciferol, Vitamin   
D3, 25 mcg (1,000 unit)   
cap Take 1,000 Units by   
mouth  
once daily.  
Ascorbic Acid 1,000 mg   
tablet Take 1,000 mg by   
mouth once daily.  
Aspirin 81 mg Tab Take   
162 mg by mouth once   
daily.  
GLUC COLBERT/CHONDRO COLBERT A/VIT   
C/MN (GLUCOSAMINE 1500   
COMPLEX ORAL) Take by   
mouth.  
CALCIUM   
CARBONATE/VITAMIN D3   
(CALCIUM + D ORAL) Take   
by mouth.  
No current   
facility-administered   
medications for this   
visit.  
Review of Systems  
Constitutional: Negative   
for chills, diaphoresis,   
fever, malaise/fatigue   
and  
weight loss.  
HENT: Negative for   
congestion, ear   
discharge, ear pain,   
hearing loss,  
nosebleeds, sinus pain,   
sore throat and   
tinnitus.  
Eyes: Negative for   
blurred vision, double   
vision, photophobia,   
pain, discharge  
and redness.  
Respiratory: Negative   
for cough, hemoptysis,   
sputum production,   
shortness of  
breath, wheezing and   
stridor.  
Cardiovascular: Negative   
for chest pain,   
palpitations, orthopnea,  
claudication, leg   
swelling and PND.  
Gastrointestinal:   
Negative for abdominal   
pain, blood in stool,   
constipation,  
diarrhea, heartburn,   
melena, nausea and   
vomiting.  
Genitourinary: Negative   
for dysuria, flank pain,   
frequency, hematuria and  
urgency.  
Musculoskeletal:   
Negative for back karen   
(more content not   
included)...        Normal                                  [facility]  
   
                                                    ECG COMPLETEon 2024   
   
                                        ECG COMPLETE        Ventricular Rate : 9  
3   
BPM  
Atrial Rate : 93 BPM  
P-R Interval : 174 ms  
QRS Duration : 90 ms  
Q-T Interval : 344 ms  
QTC Calculation(Bazett)   
: 427 ms  
Calculated P Axis : 58   
degrees  
Calculated R Axis : 21   
degrees  
Calculated T Axis : 35   
degrees  
NORMAL SINUS RHYTHM  
NORMAL ECG  
Confirmed by JO ANN DUQUE DO ([id number]) on   
2024 5:04:41 PM  
NAME : ELMIRA WARNER  
PID : [id number]  
 : Feb 15 1945  
Gender : Female  
Race :   
ORD : [id number]  
  
Procedure Date : 2024 15:50:31  
Edit Date : 2024   
17:04:44  
  
Diagnosis: NORMAL SINUS   
RHYTHM  
NORMAL ECG  
  
Confirmed by JO ANN DUQUE DO ([id number]) on   
2024 5:04:41 PM  
  
Test Reason :  
  
Location : 136 : [facility]  
  
Overread By : JO ANN DUQUE DO  
Edited By : JO ANN DUQUE DO  
Referred By :   
KINJAL CARMEN  
Acquired by : lh,   Normal                                  [facility]  
   
                                                    Maggi 2023   
   
                                        CNPN                Telephone (FAMPWS)  
------------------------  
--------  
ELMIRA WARNER   
([id number]) 1945 F  
Date Time Provider   
Department  
11/3/23 BEBETO RODRIGUEZ  
During your visit today,   
we recorded the   
following information   
about you:  
Anahy Chaparro 11/3/2023   
11:10 AM Signed  
Patient had also   
requested an Albuterol   
inhaler when she saw the   
provider, but  
only the Albuterol   
nebulizer Rx was sent   
in.  
She needs this called in   
to the pharmacy today as   
she is out.  
Elías Norton JAYDE   
11/3/2023 1:42 PM Signed  
Call to patient and   
confirmed with her that   
inhaler was sent to   
pharmacy for  
her.  
Allergies As of Date:   
2023 Noted Allergy   
Reaction  
ADHESIVE TAPE-SILICONES   
01/15/2020 2 - Rash  
DEMEROL (MEPERIDINE   
(PF)) 2012 11 -   
Vomiting  
Comments: migraine  
INFLUENZA VACCINE TRI-SP   
-10 2012 6 -   
Diarrhea  
Comments: vomiting/fever  
IODINE 2012 14 -   
Other: See Comments  
Comments: sick   
/headaches  
PENICILLINS 2012 4   
- Hives  
Comments: throat and   
mouth swelling  
SHELLFISH 2012 4 -   
Hives  
Date Reviewed:   
2023  
Reviewed by: Yanet Shen APRN.CNP - Fully   
Assessed  
Reason for Visit:  
Refill Request [94]  
Prescriptions as of   
2023  
- folic acid 0.8 mg cap  
Take 300 mg by mouth   
once daily.  
- vit B complex-C-folic   
ac-zinc 800 mcg- 12.5 mg   
tab  
Take by mouth.  
- albuterol (PROVENTIL)   
2.5 mg /3 mL (0.083 %)   
nebulizer solution  
Use 3 mL via nebulizer   
every 6 hours as needed.  
- levalbuterol tartrate   
HFA (XOPENEX HFA) 45   
mcg/actuation inhaler  
Inhale 2 Puffs as   
instructed every 6 hours   
as needed.  
- cyclobenzaprine   
(FLEXERIL) 10 mg tablet  
Take 1 tablet by mouth   
three times a day as   
needed for muscle spasm.  
- lisinopril (ZESTRIL) 5   
mg tablet  
Take 1 tablet by mouth   
once daily.  
- vit B complex   
no.12/niacin,B3,   
(VITAMIN B COMPLEX   
NO.12-NIACIN ORAL)  
Take by mouth.  
-   
TUMERIC-GING-OLIVE-OREG-  
CAPRYL ORAL  
Take by mouth.  
- Bisacodyl (DULCOLAX) 5   
mg tab  
Use as directed for   
Miralax / Gatorade Bowel   
Prep Kit  
- Nebulizers (AERONEB GO   
NEBULIZER)  
1 Each as directed.   
Portable Nebulizer with   
tubing, Dx: mild   
persistent asthma  
- Lactobac   
no.41/Bifidobact no.7   
(PROBIOTIC-10 ORAL)  
Take by mouth.  
- nitroglycerin   
sublingual (NITROSTAT)   
0.4 mg SL tablet  
Dissolve 1 tablet under   
the tongue every 5   
minutes as needed for   
Chest Pain.  
Max of 3 doses. If no   
relief, call 911.  
- MULTIVITAMIN (VITAMIN   
A DAY ORAL)  
Take by mouth.  
- Cholecalciferol,   
Vitamin D3, 25 mcg   
(1,000 unit) cap  
Take 1,000 Units by   
mouth once daily.  
- Ascorbic Acid 1,000 mg   
tablet  
Take 1,000 mg by mouth   
once daily.  
- Aspirin 81 mg Tab  
Take 81 mg by mouth once   
daily.  
- GLUC COLBERT/CHONDRO COLBERT   
A/VIT C/MN (GLUCOSAMINE   
1500 COMPLEX ORAL)  
Take by mouth.  
- CALCIUM   
CARBONATE/VITAMIN D3   
(CALCIUM + D ORAL)  
Take by mouth.  
Problem List As Of Date   
2023 Noted   
Resolved  
Hyperlipidemia, mixed   
[E78.2] 01/15/2020  
Coronary artery disease   
involving native heart   
*01/15/2020  
Hypokalemia [E87.6]   
01/15/2020  
Asymptomatic   
postmenopausal status   
[Z78.0] 01/15/2020  
TIA (transient ischemic   
attack) [G45.9]   
01/15/2020  
Mild intermittent   
asthma, uncomplicated   
[J45.20]01/15/2020  
Essential hypertension   
[I10] 01/15/2020  
Palpitations [R00.2]   
2022  
Right shoulder injury,   
subsequent encounter   
[S4*2022  
Obesity, Class I, BMI   
30-34.9 [E66.9]   
2022  
Encounter Number:   
[id number]  
Encounter Status:Closed   
by ELÍAS NORTON LPN on   
11/3/23             [facility]  
   
                                                    Maggi 2023   
   
                                        CNPN                [facility]  
------------------------  
--------  
ELMIRA WARNER   
([id number]) 1945 F  
Date Time Provider   
Department  
23 YANET SHEN  
During your visit today,   
we recorded the   
following information   
about you:  
Yanet Shen APRN.CNP   
2023 12:48 PM   
Signed  
Please call patient and   
let her know that lab   
work results look good.  
Mag level is perfectly   
in the middle of normal   
range -- I would not   
recommend  
taking a supplement.  
Lipid panel remains   
stable.  
Kidney function remains   
stable.  
Sed rate is elevated,   
however with CRP normal   
I am not concerned for   
GCA.  
Continue regimen as   
discussed at office   
visit and let me know if   
headache  
symptoms do not resolve.  
Thank you,  
UMER Saha.Dianna Caldwell LPN   
2023 1:03 PM Signed  
Spoke with pt gave   
information provided. Pt   
voices understanding.  
Allergies As of Date:   
2023 Noted Allergy   
Reaction  
ADHESIVE TAPE-SILICONES   
01/15/2020 2 - Rash  
DEMEROL (MEPERIDINE   
(PF)) 2012 11 -   
Vomiting  
Comments: migraine  
INFLUENZA VACCINE TRI-SP   
-10 2012 6 -   
Diarrhea  
Comments: vomiting/fever  
IODINE 2012 14 -   
Other: See Comments  
Comments: sick   
/headaches  
PENICILLINS 2012 4   
- Hives  
Comments: throat and   
mouth swelling  
SHELLFISH 2012 4 -   
Hives  
Date Reviewed:   
2023  
Reviewed by: Yanet Shen APRN.CNP - Fully   
Assessed  
Reason for Visit:  
Results [95]  
Prescriptions as of   
2023  
- folic acid 0.8 mg cap  
Take 300 mg by mouth   
once daily.  
- vit B complex-C-folic   
ac-zinc 800 mcg- 12.5 mg   
tab  
Take by mouth.  
- albuterol (PROVENTIL)   
2.5 mg /3 mL (0.083 %)   
nebulizer solution  
Use 3 mL via nebulizer   
every 6 hours as needed.  
- levalbuterol tartrate   
HFA (XOPENEX HFA) 45   
mcg/actuation inhaler  
Inhale 2 Puffs as   
instructed every 6 hours   
as needed.  
- cyclobenzaprine   
(FLEXERIL) 10 mg tablet  
Take 1 tablet by mouth   
three times a day as   
needed for muscle spasm.  
- lisinopril (ZESTRIL) 5   
mg tablet  
Take 1 tablet by mouth   
once daily.  
- vit B complex   
no.12/niacin,B3,   
(VITAMIN B COMPLEX   
NO.12-NIACIN ORAL)  
Take by mouth.  
-   
TUMERIC-GING-OLIVE-OREG-  
CAPRYL ORAL  
Take by mouth.  
- Bisacodyl (DULCOLAX) 5   
mg tab  
Use as directed for   
Miralax / Gatorade Bowel   
Prep Kit  
- Nebulizers (AERMesosphere GO   
NEBULIZER)  
1 Each as directed.   
Portable Nebulizer with   
tubing, Dx: mild   
persistent asthma  
- Lactobac   
no.41/Bifidobact no.7   
(PROBIOTIC-10 ORAL)  
Take by mouth.  
- nitroglycerin   
sublingual (NITROSTAT)   
0.4 mg SL tablet  
Dissolve 1 tablet under   
the tongue every 5   
minutes as needed for   
Chest Pain.  
Max of 3 doses. If no   
relief, call 911.  
- MULTIVITAMIN (VITAMIN   
A DAY ORAL)  
Take by mouth.  
- Cholecalciferol,   
Vitamin D3, 25 mcg   
(1,000 unit) cap  
Take 1,000 Units by   
mouth once daily.  
- Ascorbic Acid 1,000 mg   
tablet  
Take 1,000 mg by mouth   
once daily.  
- Aspirin 81 mg Tab  
Take 81 mg by mouth once   
daily.  
- GLUC COLBERT/CHONDRO COLBERT   
A/VIT C/MN (GLUCOSAMINE   
1500 COMPLEX ORAL)  
Take by mouth.  
- CALCIUM   
CARBONATE/VITAMIN D3   
(CALCIUM + D ORAL)  
Take by mouth.  
Problem List As Of Date   
2023 Noted   
Resolved  
Hyperlipidemia, mixed   
[E78.2] 01/15/2020  
Coronary artery disease   
involving native heart   
*01/15/2020  
Hypokalemia [E87.6]   
01/15/2020  
Asymptomatic   
postmenopausal status   
[Z78.0] 01/15/2020  
TIA (transient ischemic   
attack) [G45.9]   
01/15/2020  
Mild intermittent   
asthma, uncomplicated   
[J45.20]01/15/2020  
Essential hypertension   
[I10] 01/15/2020  
Palpitations [R00.2]   
2022  
Right shoulder injury,   
subsequent encounter   
[S4*2022  
Obesity, Class I, BMI   
30-34.9 [E66.9]   
2022  
Encounter Number:   
[id number]  
Encounter Status:Closed   
by DIANNA YU on   
23             Normal                                  [facility]  
   
                                                    25(OH)D3 Lauren-Manas 2023   
   
                                                    25-hydroxyvitamin D3   
[Mass/Vol]      62.7 ng/mL      Normal          31.0-80.0       [facility]  
   
                                        Comment on above:   Order Comment: Speci  
men Type: BLOOD SPECIMEN  
Ordering Facility: [facility]  
Address: [address] [place] [address]   
   
                                                            Result Comment: Clas  
sification of 25 OH Vitamin D status:  
Deficiency/Insufficiency: < or = 30 ng/ml.  
Sufficiency/Optimal Levels: 31-80 ng/mL  
Toxicity: > 100 ng/mL.  
Test performed by chemiluminescent immunoassay.   
   
                                                            Performed By: #### 1  
989-3 ####  
[facility]  
CLIA [id number]  
[address] [facility]  
[place] [address] [place]   
   
                                                    C-REACTIVE PROTEIN (CRP)on 2023   
   
                      CRP [Mass/Vol]                       <0.9 mg/dL [facility]  
   
                                                    CBC W Auto Differential pane  
l (Bld)on 2023   
   
                                                    Basophils (Bld)   
[#/Vol]         0.05 10*3/uL                    <0.11 k/uL      [facility]  
   
                                                    Basophils/100 WBC   
(Bld)           0.6 %                                           [facility]  
   
                                                    Differential cell   
count method Nom (Bld) Auto                                            [facility]  
   
                                                    Eosinophils (Bld)   
[#/Vol]         0.29 10*3/uL                    <0.46 k/uL      [facility]  
   
                                                    Eosinophils/100 WBC   
(Bld)           3.5 %                                           [facility]  
   
                                                    Erythrocyte   
distribution width   
(RBC) [Ratio]       13.5 %                                  11.5 - 15.0   
%                                       [facility]  
   
                                                    Hematocrit (Bld)   
[Volume fraction]   38.6 %                                  36.0 - 46.0   
%                                       [facility]  
   
                                                    Hemoglobin (Bld)   
[Mass/Vol]          12.3 g/dL                               11.5 - 15.5   
g/dL                                    [facility]  
   
                                                    Immature granulocytes   
(Bld) [#/Vol]                                   <0.10 k/uL      [facility]  
   
                                                    Immature   
granulocytes/100 WBC   
(Bld)           0.2 %                                           [facility]  
   
                                                    Lymphocytes (Bld)   
[#/Vol]             2.06 10*3/uL                            1.00 - 4.00   
k/uL                                    [facility]  
   
                                                    Lymphocytes/100 WBC   
(Bld)           24.9 %                                          [facility]  
   
                                                    MCH (RBC) [Entitic   
mass]               29.1 pg                                 26.0 - 34.0   
pg                                      [facility]  
   
                          MCHC (RBC) [Mass/Vol] 31.9 g/dL                 30.5 -  
 36.0   
g/dL                                    [facility]  
   
                                                    MCV (RBC) [Entitic   
vol]                91.3 fL                                 80.0 -   
100.0 fL                                [facility]  
   
                                                    Monocytes (Bld)   
[#/Vol]         0.52 10*3/uL                    <0.87 k/uL      [facility]  
   
                                                    Monocytes/100 WBC   
(Bld)           6.3 %                                           [facility]  
   
                                                    Neutrophils (Bld)   
[#/Vol]             5.33 10*3/uL                            1.45 - 7.50   
k/uL                                    [facility]  
   
                                                    Neutrophils/100 WBC   
(Bld)           64.5 %                                          [facility]  
   
                                                    Nucleated RBC (Bld)   
[#/Vol]                                         <0.01 k/uL      [facility]  
   
                                                    Nucleated RBC/100 WBC   
(Bld) [Ratio]   0.0 /100 WBC                                    [facility]  
   
                                                    Platelet mean volume   
(Bld) [Entitic vol] 11.8 fL                                 9.0 - 12.7   
fL                                      [facility]  
   
                                                    Platelets (Bld)   
[#/Vol]             229 10*3/uL                             150 - 400   
k/uL                                    [facility]  
   
                          RBC (Bld) [#/Vol] 4.23 10*6/uL              3.90 - 5.2  
0   
m/uL                                    [facility]  
   
                          WBC (Bld) [#/Vol] 8.27 10*3/uL              3.70 -   
11.00 k/uL                              [facility]  
   
                                                    Basophils (Bld)   
[#/Vol]         0.05 10*3/uL    Normal          <0.11           [facility]  
   
                                        Comment on above:   Order Comment: Speci  
men Type: BLOOD SPECIMENOrdering Facility:  
   
[facility] Address: [address] [place] [address]   
   
                                                            Performed By: #### 4  
537-7, 64521-4 ####[facility] LABCLIA [id number] [facility] [id number] [place]   
   
                                                    Basophils/100 WBC   
(Bld)           0.6 %           Normal                          [facility]  
   
                                        Comment on above:   Order Comment: Speci  
men Type: BLOOD SPECIMENOrdering Facility:  
   
[facility] Address: [address] [place] [address]   
   
                                                            Performed By: #### 4  
537-7, 17695-3 ####[facility] LABCLIA [id number] [place] [address] [place]   
   
                                                    Differential cell   
count method Nom (Bld) Auto            Normal                          [facility]  
   
                                        Comment on above:   Order Comment: Speci  
men Type: BLOOD SPECIMENOrdering Facility:  
   
[facility] Address: [address] [place] [address]   
   
                                                            Performed By: #### 4  
537-7, 06954-0 ####[facility] LABIA [id number] [place] [address] [place]   
   
                                                    Eosinophils (Bld)   
[#/Vol]         0.29 10*3/uL    Normal          <0.46           [facility]  
   
                                        Comment on above:   Order Comment: Speci  
men Type: BLOOD SPECIMENOrdering Facility:  
   
[facility] Address: [address] [place] [address]   
   
                                                            Performed By: #### 4  
537-7, 69372-3 ####[facility] LABCLIA [id number] [place] [address] [place]   
   
                                                    Eosinophils/100 WBC   
(Bld)           3.5 %           Normal                          [facility]  
   
                                        Comment on above:   Order Comment: Speci  
men Type: BLOOD SPECIMENOrdering Facility:  
   
[facility] Address:  [place] [address]   
   
                                                            Performed By: #### 4  
537-7, 72357-5 ####[facility] LABIA [id number] [place] [address] [place]   
   
                                                    Erythrocyte   
distribution width   
(RBC) [Ratio]   13.5 %          Normal          11.5-15.0       [facility]  
   
                                        Comment on above:   Order Comment: Speci  
men Type: BLOOD SPECIMENOrdering Facility:  
   
[facility] Address:  [place] [address]   
   
                                                            Performed By: #### 4  
537-7, 13088-1 ####[facility] LABCLIA [id number] [place] [address] [place]   
   
                                                    Hematocrit (Bld)   
[Volume fraction] 38.6 %          Normal          36.0-46.0       [facility]  
   
                                        Comment on above:   Order Comment: Speci  
men Type: BLOOD SPECIMENOrdering Facility:  
  
[facility] Address: [address] [place] [address]   
   
                                                            Performed By: #### 4  
537-7, 99715-2 ####[facility] LABCLIA [id number] [place] [address] [place]   
   
                                                    Hemoglobin (Bld)   
[Mass/Vol]      12.3 g/dL       Normal          11.5-15.5       [facility]  
   
                                        Comment on above:   Order Comment: Speci  
men Type: BLOOD SPECIMENOrdering Facility:  
   
[facility] Address: [address] [place] [address]   
   
                                                            Performed By: #### 4  
537-7, 94734-3 ####[facility] LABIA [id number] [place] [address] [place]   
   
                                                    Immature granulocytes   
(Bld) [#/Vol]   10*3/uL         Normal          <0.10           [facility]  
   
                                        Comment on above:   Order Comment: Speci  
men Type: BLOOD SPECIMENOrdering Facility:  
   
[facility] Address: [address] [place] [address]   
   
                                                            Performed By: #### 4  
537-7, 56039-8 ####[facility] LABCLIA [id number] [place] [address] [place]   
   
                                                    Immature   
granulocytes/100 WBC   
(Bld)           0.2 %           Normal                          [facility]  
   
                                        Comment on above:   Order Comment: Speci  
men Type: BLOOD SPECIMENOrdering Facility:  
   
[facility] Address: [address] [place] [address]   
   
                                                            Performed By: #### 4  
537-7, 80725-8 ####[facility] LABCLIA [id number] [place] [address] [place]   
   
                                                    Lymphocytes (Bld)   
[#/Vol]         2.06 10*3/uL    Normal          1.00-4.00       [facility]  
   
                                        Comment on above:   Order Comment: Speci  
men Type: BLOOD SPECIMENOrdering Facility:  
   
[facility] Address: [address] [place] [address]   
   
                                                            Performed By: #### 4  
537-7, 30209-2 ####[facility] LABCLIA [id number] [place] [address] [place]   
   
                                                    Lymphocytes/100 WBC   
(Bld)           24.9 %          Normal                          [facility]  
   
                                        Comment on above:   Order Comment: Speci  
men Type: BLOOD SPECIMENOrdering Facility:  
   
[facility] Address: [address] [place] [address]   
   
                                                            Performed By: #### 4  
537-7, 39235-3 ####[facility] LABCLIA [id number] [place] [address] [place]   
   
                                                    MCH (RBC) [Entitic   
mass]           29.1 pg         Normal          26.0-34.0       [facility]  
   
                                        Comment on above:   Order Comment: Speci  
men Type: BLOOD SPECIMENOrdering Facility:  
  
[facility] Address: [address] [place] [address]   
   
                                                            Performed By: #### 4  
537-7, 04112-1 ####[facility] LABCLIA [id number] [place] [address] [place]   
   
                      MCHC (RBC) [Mass/Vol] 31.9 g/dL  Normal     30.5-36.0  [facility]  
   
                                        Comment on above:   Order Comment: Speci  
men Type: BLOOD SPECIMENOrdering Facility:  
   
[facility] Address: [address] [place] [address]   
   
                                                            Performed By: #### 4  
537-7, 56454-3 ####[facility] LABCLIA [id number] [place] [address] [place]   
   
                                                    MCV (RBC) [Entitic   
vol]            91.3 fL         Normal          80.0-100.0      [facility]  
   
                                        Comment on above:   Order Comment: Speci  
men Type: BLOOD SPECIMENOrdering Facility:  
  
[facility] Address: [address] [place] [address]   
   
                                                            Performed By: #### 4  
537-7, 44044-4 ####[facility] LABCLIA [id number] [place] [address] [place]   
   
                                                    Monocytes (Bld)   
[#/Vol]         0.52 10*3/uL    Normal          <0.87           [facility]  
   
                                        Comment on above:   Order Comment: Speci  
men Type: BLOOD SPECIMENOrdering Facility:  
   
[facility] Address: [address] [place] [address]   
   
                                                            Performed By: #### 4  
537-7, 99284-8 ####[facility] LABCLIA [id number] [place] [address] [place]   
   
                                                    Monocytes/100 WBC   
(Bld)           6.3 %           Normal                          [facility]  
   
                                        Comment on above:   Order Comment: Speci  
men Type: BLOOD SPECIMENOrdering Facility:  
   
[facility] Address: [address] [place] [address]   
   
                                                            Performed By: #### 4  
537-7, 14332-4 ####[facility] LABCLIA [id number] [place] [address] [place]   
   
                                                    Neutrophils (Bld)   
[#/Vol]         5.33 10*3/uL    Normal          1.45-7.50       [facility]  
   
                                        Comment on above:   Order Comment: Speci  
men Type: BLOOD SPECIMENOrdering Facility:  
   
[facility] Address: [address] [place] [address]   
   
                                                            Performed By: #### 4  
537-7, 72365-9 ####[facility] LABCLIA [id number] [place] [address] [place]   
   
                                                    Neutrophils/100 WBC   
(Bld)           64.5 %          Normal                          [facility]  
   
                                        Comment on above:   Order Comment: Speci  
men Type: BLOOD SPECIMENOrdering Facility:  
   
[facility] Address: [address] [place] [address]   
   
                                                            Performed By: #### 4  
537-7, 63633-0 ####[facility] LABCLIA [id number] [place] [address] [place]   
   
                                                    Nucleated RBC (Bld)   
[#/Vol]         10*3/uL         Normal          <0.01           [facility]  
   
                                        Comment on above:   Order Comment: Speci  
men Type: BLOOD SPECIMENOrdering Facility:  
   
[facility] Address:  [place] [address]   
   
                                                            Performed By: #### 4  
537-7, 50152-8 ####[facility] LABCLIA [id number] [place] [address] [place]   
   
                                                    Nucleated RBC/100 WBC   
(Bld) [Ratio]   0.0 /100 WBC    Normal                          [facility]  
   
                                        Comment on above:   Order Comment: Speci  
men Type: BLOOD SPECIMENOrdering Facility:  
   
[facility] Address:  [place] [address]   
   
                                                            Performed By: #### 4  
537-7, 15311-9 ####[facility] LABIA [id number] [place] [address] [place]   
   
                                                    Platelet mean volume   
(Bld) [Entitic vol] 11.8 fL         Normal          9.0-12.7        [facility]  
   
                                        Comment on above:   Order Comment: Speci  
men Type: BLOOD SPECIMENOrdering Facility:  
  
[facility] Address:  [place] [address]   
   
                                                            Performed By: #### 4  
537-7, 25097-3 ####[facility] LABCLIA [id number] [place] [address] [place]   
   
                                                    Platelets (Bld)   
[#/Vol]         229 10*3/uL     Normal          150-400         [facility]  
   
                                        Comment on above:   Order Comment: Speci  
men Type: BLOOD SPECIMENOrdering Facility:  
   
[facility] Address:  [place] [address]   
   
                                                            Performed By: #### 4  
537-7, 82555-0 ####[facility] LABCLIA [id number] [place] [address] [place]   
   
                      RBC (Bld) [#/Vol] 4.23 10*6/uL Normal     3.90-5.20  [facility]  
   
                                        Comment on above:   Order Comment: Speci  
men Type: BLOOD SPECIMENOrdering Facility:  
   
[facility] Address: [address] [place] [address]   
   
                                                            Performed By: #### 4  
537-7, 93402-3 ####[facility] [id number] [place] [address] [place]   
   
                      WBC (Bld) [#/Vol] 8.27 10*3/uL Normal     3.70-11.00 [facility]  
   
                                        Comment on above:   Order Comment: Speci  
men Type: BLOOD SPECIMENOrdering Facility:  
   
[facility] Address: [address] [place] [address]   
   
                                                            Performed By: #### 4  
537-7, 88143-3 ####[facility] [id number] [place] [address] [place]   
   
                                                    CNOVon 2023   
   
                                        CNOV                [facility]  
[facility]  
------------------------  
--------  
ELMIRA WARNER   
([id number]) 1945 F  
Date Time Provider   
Department  
23 11:00 AM YANET SHEN FAMPWS  
During your visit today,   
we recorded the   
following information   
about you:  
Pulse Respiration Blood   
pressure Weight  
84/minute 16/minute   
122/70 77 kg  
Height  
1.6 m  
Yanet Shen APRN.CNP   
2023 1:31 PM Signed  
Chief Complaint  
Patient presents with:  
Physical  
HPI  
Elmira C Tannermendez is a   
78 year old female who   
presents here today for   
Above  
Complaints.  
Elmira is an established   
patient of Dr. Michael DO. She is a new patient   
to me  
today.  
Concerns today...  
Routine medicare   
wellness exam and review   
of chronic conditions.  
HPI:  
HTN --  
She states compliant   
with current blood   
pressure medication(s):   
2.5 mg daily --  
cutting tablet in half   
as instructed by PCP She   
does check BP at home.   
Average  
home readings: 120s/80s.   
Does report elevated a   
few days ago to 160/90s   
but  
contributes this to   
pushing neighbor in   
wheelchair that day   
which is strenuous  
activity for her.  
She denies chest pain,   
shortness of breath,   
palpitations, dizziness,   
leg edema,  
headaches, or vision   
changes.  
Last 14 Encounter BP   
Readings:  
Date: BP:  
2023 122/70  
2022 150/90  
2022 130/86  
2022 160/100  
2022 150/80  
2022 165/80  
2022 162/104  
2022 134/62  
04/15/2022 118/62  
2022 140/90  
2022 150/86  
01/15/2020 142/72  
2015 141/82  
2012 106/56  
HLD --  
Admits to not taking   
Crestor 5 mg as   
prescribed.  
Does not like to take   
medications and trying   
supplements instead.  
Asking about starting to   
take magnesium chloride   
for brain health. Read   
about  
benefits in preventing   
dementia. Wanting to   
check Mag level.  
Headaches --  
Intermittent sharp pain   
from mastoid bone to   
collarbone regimen x   
months.  
Sharp pain to frontal   
forehead and temporal   
area as well, L side > R   
side.  
Pain lasts up to 1   
minute at a time.  
Can happen a few times a   
day, but can also go   
days without any   
symptoms.  
Worse with leaning over.  
Pain sensation down back   
of neck.  
Seems worse when playing   
games on her phone.  
Does report sinus   
tenderness to L side of   
face, forehead and   
cheeks.  
Massage therapy to neck   
does help improve.  
Ice also helps.  
Has been to ENT for this   
and they said there is   
nothing more they can   
do.  
Very independent. Does   
all her own cooking,   
laundry, cleaning,   
finances, etc on  
her own.  
Does report limitations   
with mobility due to   
neck and shoulder   
surgery last  
year. Unable to lift  
Does use cane around   
house and out of house   
only when she feels   
unsteady.  
Last fall was last   
October (1 year ago).  
The pre-visit   
questionnaire has been   
completed/reviewed/scann  
ed. Concerns from  
the questionnaire: None  
HISTORY REVIEWED:   
(electronic chart   
appropriately updated)  
- current problem list   
(as below)  
- medications (as below)  
- allergies (as below)  
- current list of all   
providers  
PAST MEDICAL HISTORY  
Diagnosis Date  
Abdominal pain, right   
lower quadrant  
Abdominal pain, right   
upper quadrant  
Cerebrovascular disease,   
unspecified  
Coronary atherosclerosis   
of unspecified type of   
vessel, native or graft  
Diverticulosis of colon   
(without mention of   
hemorrhage)  
Essential hypertension,   
benign  
Intrinsic asthma with   
status asthmaticus  
Mixed hyperlipidemia  
Osteoporosis,   
unspecified  
Thoracic or lumbosacral   
neuritis or radiculitis,   
unspecified  
FAMILY HISTORY  
Problem Relation Age of   
Onset  
Stroke Father 52  
heart dx,osteoarthritis  
other (rheumatic fever)   
Sister  
other (rheumatic fever)   
Sister  
other (heart disease)   
Sister  
other (heart disease)   
Brother  
Social History  
Tobacco Use  
Smoking status: Never  
Smokeless tobacco: Never  
Vaping Use  
Vaping Use: Never used  
Substance Use Topics  
Alcohol use: Never  
Drug use: No  
Patient Care Team:  
Bebeto Rodriguez DO   
as PCP - General (Family   
Medicine)  
SAFETY ASSESSMENT:  
Need help with the   
phone, transportation,   
shopping, preparing   
meals, housework,  
laundry, medications or   
managing money? No  
Home have rugs in the   
hallway, lack of grab   
bars in the bathroom,   
lack of  
handrails on the stairs   
or have poor lighting?   
No  
Mini-Cog cognitive   
screen: Normal  
PHQ-2 screen ( little   
interest or pleasure in   
doing things  AND   
 feeling down,  
depressed or hopeless ):   
Negative  
PHYSICAL EXAMINATION:  
/70   Pulse 84     
Resp 16   Ht 160 cm (5'   
3 )   Wt 77 kg (169 lb   
12.8  
oz)   SpO2 97%   BMI   
30.08 kg/m?  
BMI 30.08 kg/(m2)  
General: alert and   
appropriate, in no   
distress, ambulates and   
transfers well,  
well-hydrated, well   
nourished  
Skin: skin color,   
texture, turgor normal,   
no rashes or lesions   
noted  
Head: normocephalic, no   
abnormality or lesion   
noted  
Ears: external ears   
normal without   
preauricular LNs or   
mastoid tenderness,  
canals clear, TM's   
normal, hearing grossly   
normal  
Nose: septum midline   
without erythem (more   
content not included)... Normal                                  [facility]  
   
                                                    CRP SerPl-mCncon 2023   
   
                      CRP [Mass/Vol] mg/L       Normal     <0.9       [facility]  
   
                                        Comment on above:   Order Comment: Speci  
men Type: BLOOD SPECIMEN  
Ordering Facility: [facility]  
Address: [address] [id number]   
   
                                                            Performed By: #### 2  
4323-8, 2532-0 ####  
[facility]  
CLIA [id number]  
[address] [place], [place] [id number] [place]   
   
                                                    Comprehensive metabolic 2000  
 panelon 2023   
   
                          Albumin [Mass/Vol] 4.0 g/dL                  3.9 - 4.9  
   
g/dL                                    [facility]  
   
                                                    ALP [Catalytic   
activity/Vol]       88 U/L                                  34 - 123   
U/L                                     [facility]  
   
                                                    ALT [Catalytic   
activity/Vol]   12 U/L                          7 - 38 U/L      [facility]  
   
                          Anion gap [Moles/Vol] 10 mmol/L                 9 - 18  
   
mmol/L                                  [facility]  
   
                                                    AST [Catalytic   
activity/Vol]   24 U/L                          13 - 35 U/L     [facility]  
   
                          Bilirubin [Mass/Vol] 0.3 mg/dL                 0.2 - 1  
.3   
mg/dL                                   [facility]  
   
                          Calcium [Mass/Vol] 10.6 mg/dL   High         8.5 - 10.  
2   
mg/dL                                   [facility]  
   
                          Chloride [Moles/Vol] 106 mmol/L   High         97 - 10  
5   
mmol/L                                  [facility]  
   
                          CO2 [Moles/Vol] 24 mmol/L                 22 - 30   
mmol/L                                  [facility]  
   
                          Creatinine [Mass/Vol] 1.01 mg/dL   High         0.58 -  
 0.96   
mg/dL                                   [facility]  
   
                                                    Estimated Glomerular   
Filtration Rate     57 mL/min/1.73m     Low                 >=60   
mL/min/1.73  
m                                       [facility]  
   
                          Glucose [Mass/Vol] 110 mg/dL    High         74 - 99   
mg/dL                                   [facility]  
   
                          Potassium [Moles/Vol] 4.4 mmol/L                3.7 -   
5.1   
mmol/L                                  [facility]  
   
                          Protein [Mass/Vol] 8.0 g/dL                  6.3 - 8.0  
   
g/dL                                    [facility]  
   
                          Sodium [Moles/Vol] 140 mmol/L                136 - 144  
   
mmol/L                                  [facility]  
   
                                                    Urea nitrogen   
[Mass/Vol]          15 mg/dL                                7 - 21   
mg/dL                                   [facility]  
   
                      Albumin [Mass/Vol] 4.0 g/dL   Normal     3.9-4.9    [facility]  
   
                                        Comment on above:   Order Comment: Speci  
men Type: BLOOD SPECIMEN  
Ordering Facility: [facility]  
Address: [address] [place] [address]   
   
                                                            Performed By: #### 2  
4323-8, 2-0 ####  
[facility]  
CLIA [id number]  
[address] [place] [address] [place]   
   
                                                    ALP [Catalytic   
activity/Vol]   88 U/L          Normal                    [facility]  
   
                                        Comment on above:   Order Comment: Speci  
men Type: BLOOD SPECIMEN  
Ordering Facility: [facility]  
Address: [address] [place] [address]   
   
                                                            Performed By: #### 2  
4323-8, -0 ####  
[facility]  
CLIA [id number]  
[address] [place] [address] [place]   
   
                                                    ALT [Catalytic   
activity/Vol]   12 U/L          Normal          7-38            [facility]  
   
                                        Comment on above:   Order Comment: Speci  
men Type: BLOOD SPECIMEN  
Ordering Facility: [facility]  
Address: [address] [place] [address]   
   
                                                            Performed By: #### 2  
4323-8, -0 ####  
[facility]  
CLIA [id number]  
[address] [place] [address] [place]   
   
                      Anion gap [Moles/Vol] 10 mmol/L  Normal     9-18       [facility]  
   
                                        Comment on above:   Order Comment: Speci  
men Type: BLOOD SPECIMEN  
Ordering Facility: [facility]  
Address: [address] [place] [address]   
   
                                                            Performed By: #### 2  
4323-8, -0 ####  
[facility]  
CLIA [id number]  
[address] [place] [address] [place]   
   
                                                    AST [Catalytic   
activity/Vol]   24 U/L          Normal          13-35           [facility]  
   
                                        Comment on above:   Order Comment: Speci  
men Type: BLOOD SPECIMEN  
Ordering Facility: [facility]  
Address: [address] [id number]   
   
                                                            Performed By: #### 2  
4323-8, -0 ####  
[facility]  
CLIA [id number]  
[address] [place] [address] [place]   
   
                      Bilirubin [Mass/Vol] 0.3 mg/dL  Normal     0.2-1.3    [facility]  
   
                                        Comment on above:   Order Comment: Speci  
men Type: BLOOD SPECIMEN  
Ordering Facility: [facility]  
Address: [address] [place] [address]   
   
                                                            Performed By: #### 2  
4323-8, -0 ####  
[facility]  
CLIA [id number]  
[address] [place] [address] [place]   
   
                      Calcium [Mass/Vol] 10.6 mg/dL High       8.5-10.2   [facility]  
   
                                        Comment on above:   Order Comment: Speci  
men Type: BLOOD SPECIMEN  
Ordering Facility: [facility]  
Address: [address] [place] [address]   
   
                                                            Performed By: #### 2  
4323-8, -0 ####  
[facility]  
CLIA [id number]  
[address] [place] [address] [place]   
   
                      Chloride [Moles/Vol] 106 mmol/L High            [facility]  
   
                                        Comment on above:   Order Comment: Speci  
men Type: BLOOD SPECIMEN  
Ordering Facility: [facility]  
Address: [address] [place] [address]   
   
                                                            Performed By: #### 2  
4323-8, 2-0 ####  
[facility]  
CLIA [id number]  
[address] [place] [address] [place]   
   
                      CO2 [Moles/Vol] 24 mmol/L  Normal     22-30      [facility]  
   
                                        Comment on above:   Order Comment: Speci  
men Type: BLOOD SPECIMEN  
Ordering Facility: [facility]  
Address: [address] [place] [address]   
   
                                                            Performed By: #### 2  
4323-8, 2532-0 ####  
[id number]  
[address] [place] [address] [place]   
   
                      Creatinine [Mass/Vol] 1.01 mg/dL High       0.58-0.96  [facility]  
   
                                        Comment on above:   Order Comment: Speci  
men Type: BLOOD SPECIMEN  
Ordering Facility: [facility]  
Address: [address] [place] [address]   
   
                                                            Performed By: #### 2  
4323-8, 0 ####  
[facility] [id number]  
[address] [place] [address] [place]   
   
                                                    Creatinine and   
Glomerular filtration   
rate.predicted panel   
(S/P/Bld)       57 mL/min/1.73m??? Low             >=60            [facility]  
   
                                        Comment on above:   Order Comment: Speci  
men Type: BLOOD SPECIMEN  
Ordering Facility: [facility]  
Address: [address] [place] [address]   
   
                                                            Result Comment: Jenna  
mated Glomerular Filtration Rate (eGFR) is   
calculated using the  CKD-EPI creatinine equation. This   
equation utilizes serum creatinine, sex, and age as parameters.   
The creatinine assay has traceable calibration to isotope   
dilution-mass spectrometry. Refer to KDIGO guidelines for   
clinical interpretation. In patients with unstable renal   
function, e.g. those with acute kidney injury, the eGFR may not   
accurately reflect actual GFR.   
   
                                                            Performed By: #### 2  
4323-8, 0 ####  
[facility] [id number]  
[address] [place] [address] [place]   
   
                      Glucose [Mass/Vol] 110 mg/dL  High       74-99      [facility]  
   
                                        Comment on above:   Order Comment: Speci  
men Type: BLOOD SPECIMEN  
Ordering Facility: [facility]  
Address: [address] [place] [address]   
   
                                                            Result Comment: The   
American Diabetes Association (ADA)   
provides guidance for cutoff values for fasting glucose and   
random glucose. The ADA defines fasting as no caloric intake   
for at least 8 hours. Fasting plasma glucose results between   
100 to 125 mg/dL indicate increased risk for diabetes   
(prediabetes).  
Fasting plasma glucose results greater than or equal to 126   
mg/dL meet the criteria for diagnosis of diabetes. In the   
absence of unequivocal hyperglycemia, results should be   
confirmed by repeat testing. In a patient with classic symptoms   
of hyperglycemia or hyperglycemic crisis, random plasma glucose   
results greater than or equal to 200 mg/dL meet the criteria   
for diagnosis of diabetes.  
Reference: Standards of Medical Care in Diabetes 2016, American   
Diabetes Association. Diabetes Care. 2016.39(Suppl 1).   
   
                                                            Performed By: #### 2  
432[address], -0 ####  
[facility]  
CLIA [id number]  
[address] [place] [address] [place]   
   
                      Potassium [Moles/Vol] 4.4 mmol/L Normal     3.7-5.1    [facility]  
   
                                        Comment on above:   Order Comment: Speci  
men Type: BLOOD SPECIMEN  
Ordering Facility: [facility]  
Address: [address] [place], [place] [id number]   
   
                                                            Performed By: #### 2  
4323-8, 0 ####  
[facility]  
CLIA [id number]  
[address] [place] [address] [place]   
   
                      Protein [Mass/Vol] 8.0 g/dL   Normal     6.3-8.0    [facility]  
   
                                        Comment on above:   Order Comment: Speci  
men Type: BLOOD SPECIMEN  
Ordering Facility: [facility]  
Address: [address] [place] [address]   
   
                                                            Performed By: #### 2  
4323-8, 0 ####  
[facility]  
CLIA [id number]  
[address] [place] [address] [place]   
   
                      Sodium [Moles/Vol] 140 mmol/L Normal     136-144    [facility]  
   
                                        Comment on above:   Order Comment: Speci  
men Type: BLOOD SPECIMEN  
Ordering Facility: [facility]  
Address: [address] [place] [address]   
   
                                                            Performed By: #### 2  
43238, 0 ####  
[facility]  
CLIA [id number]  
[address] [place] [address] [place]   
   
                                                    Urea nitrogen   
[Mass/Vol]      15 mg/dL        Normal          7-21            [facility]  
   
                                        Comment on above:   Order Comment: Speci  
men Type: BLOOD SPECIMEN  
Ordering Facility: [facility]  
Address: [address] [place] [address]   
   
                                                            Performed By: #### 2  
4323-8, 0 ####  
[facility] [id number]  
[address] [place] [address] [place]   
   
                                                    ESR Westergren method (Bld)   
[Velocity]on 2023   
   
                      ESR (Bld) [Velocity] 28 mm/h    High       0-20       [facility]  
   
                                        Comment on above:   Order Comment: Gamal  
men Type: BLOOD SPECIMENOrdering Facility:  
   
[facility] Address: [address] [place] [address]   
   
                                                            Performed By: #### 4  
537-7, 28500-7 ####[facility] LAB[facility] [id number] [place] [address] [place]   
   
                                                    HbA1c (Bld)on 2023   
   
                                                    Average glucose   
Estimated from   
glycated hemoglobin   
(Bld) [Mass/Vol] 114 mg/dL                                       [facility]  
   
                                                    HbA1c (Bld) [Mass   
fraction]       5.6 %                           4.3 - 5.6 %     [facility]  
   
                                                    Average glucose   
Estimated from   
glycated hemoglobin   
(Bld) [Mass/Vol] 114 mg/dL       Normal                          [facility]  
   
                                        Comment on above:   Order Comment: Speci  
men Type: BLOOD SPECIMEN  
Ordering Facility: [facility]  
Address: [address] [place] [address]   
   
                                                            Result Comment: eAG:  
 (Estimated average glucose) is a   
calculated value from HgbA1c and is representative of the   
average blood glucose level in the last 2-3 month period.   
   
                                                            Performed By: #### 2  
4323-8, 0 ####  
[facility] [id number]  
[address] [place] [address] [place]   
   
                                                    HbA1c (Bld) [Mass   
fraction]       5.6 %           Normal          4.3-5.6         [facility]  
   
                                        Comment on above:   Order Comment: Speci  
men Type: BLOOD SPECIMEN  
Ordering Facility: [facility]  
Address: [address] [place], [place] [id number]   
   
                                                            Result Comment: Amer  
[facility] Diabetes Association guidelines   
indicate that patients with HgbA1c in the range 5.7-6.4% are at   
increased risk for development of diabetes, and intervention by   
lifestyle modification may be beneficial. HgbA1c greater or   
equal to 6.5% is considered diagnostic of diabetes.   
   
                                                            Performed By: #### 2  
4323-8, -0 ####  
[facility]  
CLIA [id number]  
[address] [place] [address] [place]   
   
                                                    Lipid 1996 panelon   
3   
   
                      Cholesterol [Mass/Vol] 170 mg/dL             <200 mg/dL [facility]  
   
                                                    Cholesterol in HDL   
[Mass/Vol]      40 mg/dL                        >39 mg/dL       [facility]  
   
                                                    Cholesterol in LDL   
[Mass/Vol]      107 mg/dL       High            <100 mg/dL      [facility]  
   
                                                    Cholesterol in   
LDL/Cholesterol in HDL   
[Mass ratio]    2.68 {ratio}    High            <2.54           [facility]  
   
                                                    Cholesterol in VLDL   
[Mass/Vol]      23 mg/dL                        <30 mg/dL       [facility]  
   
                                                    Cholesterol non HDL   
[Mass/Vol]      130 mg/dL       High            <130 mg/dL      [facility]  
   
                                                    Cholesterol.total/Chol  
esterol in HDL [Mass   
ratio]          4.25 {ratio}                    <5.10           [facility]  
   
                      Fasting Time 16 hrs                           [facility]  
   
                                                    Triglyceride   
[Mass/Vol]      114 mg/dL                       <150 mg/dL      [facility]  
   
                      Cholesterol [Mass/Vol] 170 mg/dL  Normal     <200       [facility]  
   
                                        Comment on above:   Order Comment: Speci  
men Type: BLOOD SPECIMEN  
Ordering Facility: [facility]  
Address: [address] [place] [address]   
   
                                                            Result Comment: <200  
 mg/dL, Desirable  
200-239 mg/dL, Borderline high  
>239 mg/dL, High   
   
                                                            Performed By: #### 2  
4323-8, -0 ####  
[facility]  
CLIA [id number]  
[address] [place] [address] [place]   
   
                                                    Cholesterol in HDL   
[Mass/Vol]      40 mg/dL        Normal          >39             [facility]  
   
                                        Comment on above:   Order Comment: Speci  
men Type: BLOOD SPECIMEN  
Ordering Facility: [facility]  
Address: [address] [place], [place] [address] [place] [address]   
   
                                                            Result Comment: 40-5  
9 mg/dL, Acceptable  
>59 mg/dL, High: Negative risk factor for coronary heart   
disease  
<40 mg/dL, Low: Positive risk factor for coronary heart disease   
   
                                                            Performed By: #### 2  
4323-8, 0 ####  
[facility]  
CLIA [id number]  
[address] [place] [address] [place]   
   
                                                    Cholesterol in LDL   
[Mass/Vol]      107 mg/dL       High            <100            [facility]  
   
                                        Comment on above:   Order Comment: Speci  
men Type: BLOOD SPECIMEN  
Ordering Facility: [facility]  
Address: [address] [place] [address]   
   
                                                            Result Comment: <100  
 mg/dL, Optimal  
100-129 mg/dL, Near optimal/above optimal  
130-159 mg/dL, Borderline high  
160-189 mg/dL, High  
>189 mg/dL, Very high  
Secondary prevention optimal LDL Cholesterol levels are   
recommended to be < 70 mg/dL   
   
                                                            Performed By: #### 2  
4323-8,  ####  
[facility]  
CLIA [id number]  
[address] [place] [address] [place]   
   
                                                    Cholesterol in   
LDL/Cholesterol in HDL   
[Mass ratio]    2.68 {ratio}    High            <2.54           [facility]  
   
                                        Comment on above:   Order Comment: Speci  
men Type: BLOOD SPECIMEN  
Ordering Facility: [facility]  
Address: [address] [place] [address]   
   
                                                            Result Comment: Refeva brownlee:  
1. National Cholesterol Education Program ATP III Guideline   
At-A-Glance Quick Desk Reference: National Heart, Lung, and   
Blood [place]. National Institutes of Health. 2001: NIH   
Publication No. .  
2. An International Atherosclerosis Society position paper:   
global recommendations for the management of dyslipidemia:   
executive summary, Atherosclerosis. 2014: 232(2):410-413.   
   
                                                            Performed By: #### 2  
4323-8, 0 ####  
[facility]  
CLIA [id number]  
[address] [place] [address] [place]   
   
                                                    Cholesterol in VLDL   
[Mass/Vol]      23 mg/dL        Normal          <30             [facility]  
   
                                        Comment on above:   Order Comment: Speci  
men Type: BLOOD SPECIMEN  
Ordering Facility: [facility]  
Address: [address] [place] [address]   
   
                                                            Performed By: #### 2  
4323-8, -0 ####  
[facility]  
CLIA [id number]  
[address] [place] [address] [place]   
   
                                                    Cholesterol non HDL   
[Mass/Vol]      130 mg/dL       High            <130            [facility]  
   
                                        Comment on above:   Order Comment: Speci  
men Type: BLOOD SPECIMEN  
Ordering Facility: [facility]  
Address: [address] [place] [address]   
   
                                                            Result Comment: <130  
 mg/dL, Optimal  
130-159 mg/dL, Near optimal/above optimal  
160-189 mg/dL, Borderline high  
190-219 mg/dL, High  
>219 mg/dL, Very high  
Secondary prevention optimal non HDL Cholesterol levels are   
recommended to be <100 mg/dL   
   
                                                            Performed By: #### 2  
4323-8, -0 ####  
[facility]  
CLIA [id number]  
[address] [place] [address] [place]   
   
                                                    Cholesterol.total/Chol  
esterol in HDL [Mass   
ratio]          4.25 {ratio}    Normal          <5.10           [facility]  
   
                                        Comment on above:   Order Comment: Speci  
men Type: BLOOD SPECIMEN  
Ordering Facility: [facility]  
Address: [address] [place] [address]   
   
                                                            Performed By: #### 2  
4323-8, 0 ####  
[facility]  
CLIA [id number]  
[address] [place] [address] [place]   
   
                      FASTING TIME 16 hrs     Normal                [facility]  
   
                                        Comment on above:   Order Comment: Speci  
men Type: BLOOD SPECIMEN  
Ordering Facility: [facility]  
Address: [address] [place] [address]   
   
                                                            Performed By: #### 2  
4323-8, -0 ####  
[facility]  
CLIA [id number]  
[address] [place] [address] [place]   
   
                                                    Triglyceride   
[Mass/Vol]      114 mg/dL       Normal          <150            [facility]  
   
                                        Comment on above:   Order Comment: Speci  
men Type: BLOOD SPECIMEN  
Ordering Facility: [facility]  
Address: [address] [place] [address]   
   
                                                            Result Comment: <150  
 mg/dL, Normal  
150-199 mg/dL, Borderline high  
200-499 mg/dL, High  
>499 mg/dL, Very high   
   
                                                            Performed By: #### 2  
4323-8, 2532-0 ####  
[facility]  
CLIA [id number]  
[address] [place] [address] [place]   
   
                                                    MAGNESIUM BLDon 2023   
   
                          Magnesium [Mass/Vol] 2.0 mg/dL                 1.7 - 2  
.3   
mg/dL                                   [facility]  
   
                                                    Magnesium SerPl-mCncon    
   
                      Magnesium [Mass/Vol] 2.0 mg/dL  Normal     1.7-2.3    [facility]  
   
                                        Comment on above:   Order Comment: Speci  
men Type: BLOOD SPECIMEN  
Ordering Facility: [facility]  
Address: [address] [place] [address]   
   
                                                            Performed By: #### 1  
989-3 ####  
[facility]  
CLIA [id number]  
[address] [place] [address] [place]   
   
                                                    VITAMIN D 25 HYDROXYon    
   
                                                    25-hydroxyvitamin D3   
[Mass/Vol]          62.7 ng/mL                              31.0 - 80.0   
ng/mL                                   [facility]  
   
                                                    XR CERVICAL SPINE COMPLETE 4  
-5 VIEWSon 2023   
   
                                                    XR CERVICAL SPINE   
COMPLETE 4-5 VIEWS                      Date of Exam: 2023  
Views: 4 views of the   
cervical spine (AP and   
lateral, flexion,   
extension)  
Findings: Interbody   
spacers are present at   
C4-5, C5-6, and C6-7   
along with  
anterior plate and   
screws spanning C4-7.   
Alignment appears   
similar  
compared to prior   
radiographs. There is   
perhaps some subsidence   
of the  
interbody spacers.   
Although, this appears   
relatively stable   
compared to  
her prior radiographs.   
There is minimal   
widening of the spinous   
processes  
between C4-5, C5-6, and   
C6-7 with   
flexion/extension views.   
Moderate  
degenerative changes   
throughout the remainder   
of the cervical spine. Normal                                  [facility]  
   
                                                    Office Visiton 2023   
   
                                        Follow-up visit     [id number] KAM Warnerbud   
WANDA 1945 F  
Date Provider Department   
Center  
20[facility] [id number]   
None  
No family history on   
file  
Level of Service:53428   
MD OFFICE/OUTPATIENT   
ESTABLISHED LOW MDM   
20-29 MIN  
Reason for Visit and   
Comments:  
Neck Pain [525720] -   
0/10  
Arm Pain [138323] - R   
1/10                Normal                                  [facility]  
   
                                                    Progress Noteon 2023   
   
                                        Progress Note       [facility]  
[address] [place] [address]  
[address]  
[facility] [address]  
Dept: [phone]  
Dept Fax: [phone]  
Elmira MUÑOZ Timmy  
1945  
[id number]  
2023  
Problem List:  
ACDF C4-7, 22  
Neck pain  
Cervical radiculopathy  
Cervical myelopathy  
Cervical spondylosis  
Grade 1   
spondylolisthesis at   
C7-T1  
Diagnoses:  
(M47.812) Cervical   
spondylosis  
(M54.2) Neck pain  
(Z98.1) Status post   
cervical spinal fusion  
Chief Complaint  
Patient presents with  
Neck Pain  
0/10  
Arm Pain  
R 1/10  
HPI:  
Elmira is a 78 y.o.   
female who is here today   
for 12-month PO ACDF   
C4-7, 22 .  
Current Symptoms:  
1) occasionally   
pain/pressure on left   
side of neck  
2) occasional numbness   
and tingling in her   
upper extremities  
Changes since last   
visit:  
1) No falls since   
2022  
Aggravating factors:  
1) Nothing  
Alleviating Factors:  
1) Tylenol  
Current Treatment:  
1) ACDF C4-7 DOS:   
2022  
2) Tylenol  
Review of Systems  
Allergies  
Allergen Reactions  
Iodine GI intolerance,   
Nausea And Vomiting and   
Other  
Other reaction(s): GI   
Intolerance, Other (See   
Comments), Other: See   
Comments  
Other reaction(s): GI   
Intolerance, GI Upset,   
Other: See Comments  
Other reaction(s):   
Other: See Comments  
sick /headaches  
sick /headaches  
Other reaction(s):   
Other: See Comments  
sick /headaches  
sick /headaches  
Other reaction(s):   
Other: See Comments  
sick /headaches  
Other reaction(s):   
Other: See Comments  
sick /headaches  
Meperidine GI   
intolerance, Nausea And   
Vomiting, Other and   
Vomiting Only  
Other reaction(s): AOF,   
GI Intolerance, Upset   
Stomach, Vomiting  
Other reaction(s): GI   
Intolerance, GI Upset,   
Other - comment   
required, Vomiting  
Other reaction(s): Other   
- comment required  
Terrible headache after   
demerol after myelogram  
Terrible headache after   
demerol after myelogram  
Other reaction(s):   
Vomiting  
migraine  
migraine  
Terrible headache after   
demerol after myelogram  
Other reaction(s): Other   
- comment required  
Terrible headache after   
demerol after myelogram  
Other reaction(s):   
Vomiting  
migraine  
migraine  
Other reaction(s): Other   
- comment required  
Terrible headache after   
demerol after myelogram  
Other reaction(s):   
Vomiting  
migraine  
Other reaction(s): Other   
- comment required  
Terrible headache after   
demerol after myelogram  
Terrible headache after   
demerol after myelogram  
Other reaction(s):   
Vomiting  
migraine  
migraine  
Terrible headache after   
demerol after myelogram  
migraine  
Meperidine Hcl Nausea   
And Vomiting  
Penicillins Hives, Rash   
and Swelling  
Other reaction(s): AOF,   
Hives  
Swelling in throat,   
lips, mouth, hands  
throat and mouth   
swelling  
Swelling in throat,   
lips, mouth, hands  
throat and mouth   
swelling  
throat and mouth   
swelling  
Swelling in throat,   
lips, mouth, hands  
Swelling in throat,   
lips, mouth, hands  
throat and mouth   
swelling  
Atorvastatin  
Other reaction(s):   
Myalgia  
Other reaction(s):   
Myalgia, Myalgia  
Other reaction(s):   
Myalgia  
Other reaction(s):   
Myalgia  
Other reaction(s):   
Myalgia  
Other reaction(s):   
Myalgia  
Iodinated Contrast Media   
Nausea And Vomiting  
Other reaction(s): Other   
- comment required  
Other reaction(s): Other   
- comment required  
Doesn't know what   
happened, but has done   
okay with premed with   
heart caths  
Doesn't know what   
happened, but has done   
okay with premed with   
heart caths  
Other reaction(s): Other   
- comment required  
Doesn't know what   
happened, but has done   
okay with premed with   
heart caths  
Doesn't know what   
happened, but has done   
okay with premed with   
heart caths  
Shellfish-Derived   
Products  
Tape Hives  
Latex Itching and Rash  
Current Outpatient   
Medications  
Medication Sig Dispense   
Refill  
albuterol (2.5 MG/3ML)   
0.083% nebulizer   
solution 2.5 mg.  
ascorbic acid (Vitamin   
C) 1000 MG tablet Take   
by mouth.  
aspirin 81 MG chewable   
tablet Chew 81 mg.  
Aspirin-Calcium   
Carbonate  MG   
tablet Take 81 mg by   
mouth.  
bisoprolol (Zebeta) 5 MG   
tablet Take 5 mg by   
mouth.  
CALCIUM   
CARB-CHOLECALCIFEROL PO   
Take by mouth.  
Flaxseed, Linseed, (Flax   
Seed Oil) 1000 MG   
capsule Take 1 capsule   
by mouth  
daily.  
fluocinolone (DermOtic)   
0.01 % ear drops   
Administer 4 drops into   
ears Monday,  
Wednesday, Friday Start   
22.  
lisinopril 2.5 MG tablet   
Take 2.5 mg by mouth   
daily.  
Multiple   
Vitamins-Minerals   
(Centrum Silver   
50+Women) tablet Take by   
mouth.  
naproxen (Naprosyn) 500   
MG tablet Take 1 tablet   
by mouth twice daily as   
needed  
(for pain/inflammation)   
for up to 14 days. Take   
with food.  
nitroglycerin   
(Nitrostat) 0.4 MG SL   
tablet nitroglycerin 0.4   
mg sublingual  
tablet  
Potassium Gluconate 2.5   
MEQ tablet Take 1 tablet   
by mouth daily.  
Vitamin A (beta   
carotene) 3 MG (39370   
UT) tablet Take by   
mouth.  
No current   
facility-administered   
medications for this   
visit.  
Past Medical History:  
Diagnosis Date  
Anxiety  
Arthritis  
Asthma  
CAD (coronary artery   
disease)   
7 stents total, HX MI  
History (more content   
not included)...    Normal                                  [facility]  
   
                                                    Basic metabolic 2000 panelon  
 2022   
   
                          Anion gap [Moles/Vol] 7 mmol/L     Low          9 - 18  
   
mmol/L                                  [facility]  
   
                          Calcium [Mass/Vol] 10.0 mg/dL                8.5 - 10.  
2   
mg/dL                                   [facility]  
   
                          Chloride [Moles/Vol] 106 mmol/L   High         97 - 10  
5   
mmol/L                                  [facility]  
   
                          CO2 [Moles/Vol] 25 mmol/L                 22 - 30   
mmol/L                                  [facility]  
   
                          Creatinine [Mass/Vol] 0.90 mg/dL                0.58 -  
 0.96   
mg/dL                                   [facility]  
   
                                                    Estimated Glomerular   
Filtration Rate     66 mL/min/1.73m                         >=60   
mL/min/1.73  
m                                       [facility]  
   
                          Glucose [Mass/Vol] 95 mg/dL                  74 - 99   
mg/dL                                   [facility]  
   
                          Potassium [Moles/Vol] 4.1 mmol/L                3.7 -   
5.1   
mmol/L                                  [facility]  
   
                          Sodium [Moles/Vol] 138 mmol/L                136 - 144  
   
mmol/L                                  [facility]  
   
                                                    Urea nitrogen   
[Mass/Vol]          23 mg/dL            High                7 - 21   
mg/dL                                   [facility]  
   
                                                    CBC panel Auto (Bld)on    
   
                                                    Erythrocyte   
distribution width   
(RBC) [Ratio]       14.0 %                                  11.5 - 15.0   
%                                       [facility]  
   
                                                    Hematocrit (Bld)   
[Volume fraction]   36.2 %                                  36.0 - 46.0   
%                                       [facility]  
   
                                                    Hemoglobin (Bld)   
[Mass/Vol]          11.9 g/dL                               11.5 - 15.5   
g/dL                                    [facility]  
   
                                                    MCH (RBC) [Entitic   
mass]               28.9 pg                                 26.0 - 34.0   
pg                                      [facility]  
   
                          MCHC (RBC) [Mass/Vol] 32.9 g/dL                 30.5 -  
 36.0   
g/dL                                    [facility]  
   
                                                    MCV (RBC) [Entitic   
vol]                87.9 fL                                 80.0 -   
100.0 fL                                [facility]  
   
                                                    Nucleated RBC (Bld)   
[#/Vol]                                         <0.01 k/uL      [facility]  
   
                                                    Platelet mean volume   
(Bld) [Entitic vol] 11.3 fL                                 9.0 - 12.7   
fL                                      [facility]  
   
                                                    Platelets (Bld)   
[#/Vol]             221 10*3/uL                             150 - 400   
k/uL                                    [facility]  
   
                          RBC (Bld) [#/Vol] 4.12 10*6/uL              3.90 - 5.2  
0   
m/uL                                    [facility]  
   
                          WBC (Bld) [#/Vol] 9.24 10*3/uL              3.70 -   
11.00 k/uL                              [facility]  
   
                                                    Basic Metabolic Panelon 2  
   
   
                      Calcium [Mass/Vol] 10.5 mg/dL High       8.4-10.4   [facility]  
   
                                        Comment on above:   Performed By: #### H  
NORMA ESCALANTE3 ####  
[facility]  
[address] [place] [address]   
   
                      Glucose [Mass/Vol] 129 mg/dL  High            [facility]  
   
                                        Comment on above:   Performed By: #### H  
BORIS BMP3 ####  
[facility]  
[address] [place] [address]   
   
                      Anion gap [Moles/Vol] 8 mmol/L   Normal     3-13       [facility]  
   
                                        Comment on above:   Performed By: #### H  
BORIS BMP3 ####  
[address] [address]   
   
                      CO2 [Moles/Vol] 25 mmol/L  Normal     22-30      [facility]   
System  
   
                                        Comment on above:   Performed By: #### H  
YESSIF, BMP3 ####  
[address] [address]   
   
                      Creatinine [Mass/Vol] 0.89 mg/dL Normal     0.52-1.25  [facility]  
   
                                        Comment on above:   Performed By: #### H  
BORIS BMP3 ####  
[address] [address]   
   
                                                    GFR/1.73 sq   
M.predicted among   
blacks MDRD (S/P/Bld)   
[Vol rate/Area] 71.8 mL/min/{1.73_m2} Normal          >60             [facility]   
System  
   
                                        Comment on above:   Performed By: #### H  
BORIS BMP3 ####  
[address] [address]   
   
                                                    GFR/1.73 sq   
M.predicted among   
non-blacks MDRD   
(S/P/Bld) [Vol   
rate/Area]      62.0 mL/min/{1.73_m2} Normal          >60             [facility]   
System  
   
                                        Comment on above:   Result Comment: KDIG  
O guidelines provide the following GFR   
categories:  
Stage GFR(ml/min/1.73 m2) Terms  
G1 >=90 Normal or high  
G2 60-89 Mildly decreased*  
G3a 45-59 Mildly to moderately decreased  
G3b 30-44 Moderately to severely decreased  
G4 15-29 Severely decreased  
G5 <15 Kidney failure  
*Relative to young adult level.  
In the absence of evidence of kidney damage, neither GFR  
category G1 nor G2 fulfill the criteria for CKD.  
The CKD-EPI equation is validated in individuals 18 years  
of age and older. Currently the best equation for  
estimating glomerular filtration rate (GFR) from serum  
creatinine in children is the Bedside Alvarez equation.  
It is less accurate in patients with extremes of muscle  
mass, restriction of dietary protein, ingestion of creatine,  
extra-renal metabolism of creatinine, or treatment with  
medications that affect renal tubular creatinine secretion.   
   
                                                            Performed By: #### H  
BORIS BMP3 ####  
[facility]  
[address] [place] [address]   
   
                                                    Urea nitrogen   
[Mass/Vol]      15 mg/dL        Normal          9-20            [facility]  
   
                                        Comment on above:   Performed By: #### H  
BORIS BMP3 ####  
[facility]  
[address] [place] [address]   
   
                      Chloride [Moles/Vol] 103 mmol/L Normal          [facility]  
A  
   
                                        Comment on above:   Performed By: #### H  
BORIS BMP3 ####  
[place] [address] [place] [address]   
   
                      Potassium [Moles/Vol] 4.1 mmol/L Normal     3.5-5.1    [facility]  
[facility]  
   
                                        Comment on above:   Performed By: #### H  
BORIS BMP3 ####  
[place] [address] [place] [address]   
   
                      Sodium [Moles/Vol] 137 mmol/L Normal     135-145    [facility]  
   
                                        Comment on above:   Performed By: #### H  
BORIS BMP3 ####  
[address] [address]   
   
                          Anion gap [Moles/Vol] 8 mmol/L                  3 - 13  
   
mmol/L                                  [facility]  
   
                          Calcium [Mass/Vol] 10.5 mg/dL   High         8.4 - 10.  
4   
mg/dL                                   [facility]  
   
                          CO2 [Moles/Vol] 25 mmol/L                 22 - 30   
mmol/L                                  [facility]  
   
                          Creatinine [Mass/Vol] 0.89 mg/dL                0.52 -  
 1.25   
mg/dL                                   [facility]  
   
                                                    EGFR IF NonAfrican   
American        62.0 mL/min                     >60             [facility]  
   
                                        Comment on above:   KDIGO guidelines pro  
vide the following GFR categories:  
Stage GFR(ml/min/1.73 m2) Terms  
G1 >=90 Normal or high  
G2 60-89 Mildly decreased*  
G3a 45-59 Mildly to moderately decreased  
G3b 30-44 Moderately to severely decreased  
G4 15-29 Severely decreased  
G5 <15 Kidney failure  
  
*Relative to young adult level.  
In the absence of evidence of kidney damage, neither GFR  
category G1 nor G2 fulfill the criteria for CKD.  
  
The CKD-EPI equation is validated in individuals 18 years  
of age and older. Currently the best equation for  
estimating glomerular filtration rate (GFR) from serum  
creatinine in children is the Bedside Alvarez equation.  
It is less accurate in patients with extremes of muscle  
mass, restriction of dietary protein, ingestion of creatine,  
extra-renal metabolism of creatinine, or treatment with  
medications that affect renal tubular creatinine secretion.  
  
   
   
                                                    GFR/1.73 sq   
M.predicted among   
blacks MDRD (S/P/Bld)   
[Vol rate/Area] 71.8 mL/min/{1.73_m2}                 >60             [facility]  
   
                          Glucose [Mass/Vol] 129 mg/dL    High         70 - 100   
mg/dL                                   [facility]  
   
                                                    Interpretation and   
review of laboratory   
results         Abnormal                                        [facility]  
   
                                                    Urea nitrogen (BldV)   
[Mass/Vol]          15 mg/dL                                9 - 20   
mg/dL                                   [facility]  
   
                                                            Test Performed by [facility], [address] [place] [address]   
LAB  
   
                                                                  [facility]  
   
                                                    CBC with Auto Differentialon  
 2022   
   
                          Absolute Baso # 0.0 10*3/uL               0.0 - 0.2   
10*3/uL                                 [facility]  
   
                          Absolute Neut # 17.9 10*3/uL High         1.8 - 7.0   
10*3/uL                                 [facility]  
   
                                                    Basophils/100 WBC   
(Bld)           0.1 %                           0.0 - 2.0 %     [facility]  
   
                                                    Eosinophils (Bld)   
[#/Vol]             0.0 10*3/uL                             0.0 - 0.5   
10*3/uL                                 [facility]  
   
                                                    Eosinophils/100 WBC   
(Bld)           0.0 %           Low             1.0 - 6.0 %     [facility]  
   
                                                    Granulocytes/100 WBC   
(Bld)               92.2 %              High                40.0 - 80.0   
%                                       [facility]  
   
                                                    Hematocrit (Bld)   
[Volume fraction]   31.6 %              Low                 35.0 - 47.0   
%                                       [facility]  
   
                                                    Hemoglobin.gastrointes  
tinal spec 1 Ql (Stl) 10.1 g/dL           Low                 11.7 - 16.0   
g/dL                                    [facility]  
   
                                                    Interpretation and   
review of laboratory   
results         Abnormal                                        [facility]  
   
                                                    Lymphocytes (Bld)   
[#/Vol]             1.2 10*3/uL                             1.0 - 4.3   
10*3/uL                                 [facility]  
   
                                                    Lymphocytes/100 WBC   
(Bld)               6.2 %               Low                 20.0 - 40.0   
%                                       [facility]  
   
                                                    MCH (RBC) [Entitic   
mass]               26.0 pg                                 26.0 - 34.0   
pg                                      [facility]  
   
                          MCHC (RBC) [Mass/Vol] 31.9 %       Low          32.0 -  
 36.0   
%                                       [facility]  
   
                                                    MCV (RBC) [Entitic   
vol]                81.7 fL                                 79.0 - 98.0   
fL                                      [facility]  
   
                                                    Monocytes (Bld)   
[#/Vol]             0.3 10*3/uL                             0.0 - 0.8   
10*3/uL                                 [facility]  
   
                                                    Monocytes/100 WBC   
(Bld)               1.5 %               Low                 2.0 - 10.0   
%                                       [facility]  
   
                                                    Platelet distribution   
width (Bld) [Ratio] 17.3 %              High                11.5 - 14.5   
%                                       [facility]  
   
                                                    Platelet mean volume   
(Bld) [Entitic vol] 9.6 fL                                  7.4 - 10.4   
fL                                      [facility]  
   
                                                    Platelets (Bld)   
[#/Vol]             312 10*3/uL                             140 - 440   
10*3/uL                                 [facility]  
   
                          RBC (Bld) [#/Vol] 3.86 10*6/uL              3.80 - 5.2  
0   
10*6/uL                                 [facility]  
   
                          WBC (Bld) [#/Vol] 19.5 10*3/uL High         3.6 - 10.7  
   
10*3/uL                                 [facility]  
   
                                                            Test Performed by [facility], [address] [place] [address]   
[id number] [place] [address]   
LAB  
   
                                                                  [facility]  
   
                                                    Hemogram w/ Autodiffon    
   
                      Abs Baso Cnt 0.0 10*3/uL Normal     0.0-0.2    [facility]  
h   
System  
   
                                        Comment on above:   Performed By: #### H  
BORIS BMP3 ####  
[address] [address] [place] [address]   
   
                      Abs Neutrophile Cnt 17.9 10*3/uL High       1.8-7.0    [facility]  
   
                                        Comment on above:   Performed By: #### H  
BORIS BMP3 ####  
[address] [address]   
   
                                                    Basophils/100 WBC   
(Bld)           0.1 %           Normal          0.0-2.0         [facility]  
   
                                        Comment on above:   Performed By: #### H  
BORIS BMP3 ####  
[address] [address]   
   
                                                    Eosinophils (Bld)   
[#/Vol]         0.0 10*3/uL     Normal          0.0-0.5         [facility]  
   
                                        Comment on above:   Performed By: #### H  
BORIS BMP3 ####  
[facility]  
[address] [place] [address]   
   
                                                    Eosinophils/100 WBC   
(Bld)           0.0 %           Low             1.0-6.0         [facility]  
   
                                        Comment on above:   Performed By: #### H  
EMDF, BMP3 ####  
[facility]  
[address] [place], [place]    
   
                                                    Erythrocyte   
distribution width   
(RBC) [Ratio]   17.3 %          High            11.5-14.5       [facility]  
   
                                        Comment on above:   Performed By: #### H  
EMDF, BMP3 ####  
[facility]  
[address] [place] [address]   
   
                                                    Granulocytes/100 WBC   
(Bld)           92.2 %          High            40.0-80.0       [facility]  
   
                                        Comment on above:   Performed By: #### H  
EMDF, BMP3 ####  
[address] [address] [address] [place]    
   
                                                    Hematocrit (Bld)   
[Volume fraction] 31.6 %          Low             35.0-47.0       [facility]  
   
                                        Comment on above:   Performed By: #### H  
EMDF, BMP3 ####  
[facility]  
[address] [place], [place]    
   
                                                    Hemoglobin (Bld)   
[Mass/Vol]      10.1 g/dL       Low             11.7-16.0       [facility]  
   
                                        Comment on above:   Performed By: #### H  
EMDF, BMP3 ####  
[facility]  
[address] [place], [place]    
   
                                                    Lymphocytes (Bld)   
[#/Vol]         1.2 10*3/uL     Normal          1.0-4.3         [facility]  
   
                                        Comment on above:   Performed By: #### H  
EMDF, BMP3 ####  
[facility]  
[address] [place] [address]   
   
                                                    Lymphocytes/100 WBC   
(Bld)           6.2 %           Low             20.0-40.0       [facility]  
   
                                        Comment on above:   Performed By: #### H  
EMDF, BMP3 ####  
[facility]  
[address] [place], [place]    
   
                                                    MCH (RBC) [Entitic   
mass]           26.0 pg         Normal          26.0-34.0       [facility]  
   
                                        Comment on above:   Performed By: #### H  
EMDF, BMP3 ####  
[facility]  
[address] [place], [place]    
   
                      MCHC       31.9 %     Low        32.0-36.0  [facility]  
   
                                        Comment on above:   Performed By: #### H  
EMDF, BMP3 ####  
[facility]  
[address] [place]    
   
                                                    MCV (RBC) [Entitic   
vol]            81.7 fL         Normal          79.0-98.0       [facility]  
   
                                        Comment on above:   Performed By: #### H  
EMDF, BMP3 ####  
[address] [address] [address] [place]    
   
                                                    Monocytes (Bld)   
[#/Vol]         0.3 10*3/uL     Normal          0.0-0.8         [facility]  
   
                                        Comment on above:   Performed By: #### H  
EMDF, BMP3 ####  
[address] [address] [address] [place]    
   
                                                    Monocytes/100 WBC   
(Bld)           1.5 %           Low             2.0-10.0        [facility]  
   
                                        Comment on above:   Performed By: #### H  
EMDF, BMP3 ####  
[address] [address] [address] [place]    
   
                                                    Platelet mean volume   
(Bld) [Entitic vol] 9.6 fL          Normal          7.4-10.4        [facility]  
   
                                        Comment on above:   Performed By: #### H  
EMDF, BMP3 ####  
[address] [address] [address] [place]    
   
                                                    Platelets (Bld)   
[#/Vol]         312 10*3/uL     Normal          140-440         [facility]  
   
                                        Comment on above:   Performed By: #### H  
EMDF, BMP3 ####  
[facility]  
[address] [place], [place]    
   
                      RBC (Bld) [#/Vol] 3.86 10*6/uL Normal     3.80-5.20  [facility]  
   
                                        Comment on above:   Performed By: #### H  
EMDF, BMP3 ####  
[address] [address] [address] [place]    
   
                      WBC (Bld) [#/Vol] 19.5 10*3/uL High       3.6-10.7   [facility]  
   
                                        Comment on above:   Performed By: #### H  
EMD, BMP3 ####  
[address]    
   
                                                    OPERATIVE REPORTon    
   
                                                            Ordered by an   
unspecified provider.                                         [facility]  
   
                                                    Vit D, 1,25-Dihydroxyon    
   
                      Vit D, 1,25-Dihydroxy 41.6 pg/mL Normal     19.9-79.3  [facility]  
   
                                        Comment on above:   Result Comment: INTE  
RPRETIVE INFORMATION: Vitamin D,   
1,25-Dihydroxy  
This test is primarily indicated during patient evaluation for  
hypercalcemia and renal failure. A normal result does not rule   
out  
Vitamin D deficiency. The recommended test for diagnosing   
Vitamin  
D deficiency is Vitamin D 25-hydroxy.  
Performed By: [facility]  
[address] [place] [address]  
: Shirley Valencia MD   
   
                                                            Performed By: #### D  
125O ####  
The performing lab is in the report.  
#### CMP3 ####  
[address]    
   
                                                    CBC with Auto Differentialon  
 2022   
   
                          Absolute Baso # 0.0 10*3/uL               0.0 - 0.2   
10*3/uL                                 [facility]  
   
                          Absolute Neut # 6.6 10*3/uL               1.8 - 7.0   
10*3/uL                                 [facility]  
   
                                                    Basophils/100 WBC   
(Bld)           0.4 %                           0.0 - 2.0 %     [facility]  
   
                                                    Eosinophils (Bld)   
[#/Vol]             0.3 10*3/uL                             0.0 - 0.5   
10*3/uL                                 [facility]  
   
                                                    Eosinophils/100 WBC   
(Bld)           3.0 %                           1.0 - 6.0 %     [facility]  
   
                                                    Granulocytes/100 WBC   
(Bld)               68.8 %                                  40.0 - 80.0   
%                                       [facility]  
   
                                                    Hematocrit (Bld)   
[Volume fraction]   31.5 %              Low                 35.0 - 47.0   
%                                       [facility]  
   
                                                    Hemoglobin.gastrointes  
tinal spec 1 Ql (Stl) 10.3 g/dL           Low                 11.7 - 16.0   
g/dL                                    [facility]  
   
                                                    Interpretation and   
review of laboratory   
results         Abnormal                                        [facility]  
   
                                                    Lymphocytes (Bld)   
[#/Vol]             2.0 10*3/uL                             1.0 - 4.3   
10*3/uL                                 [facility]  
   
                                                    Lymphocytes/100 WBC   
(Bld)               21.3 %                                  20.0 - 40.0   
%                                       [facility]  
   
                                                    MCH (RBC) [Entitic   
mass]               26.5 pg                                 26.0 - 34.0   
pg                                      [facility]  
   
                          MCHC (RBC) [Mass/Vol] 32.8 %                    32.0 -  
 36.0   
%                                       [facility]  
   
                                                    MCV (RBC) [Entitic   
vol]                81.0 fL                                 79.0 - 98.0   
fL                                      [facility]  
   
                                                    Monocytes (Bld)   
[#/Vol]             0.6 10*3/uL                             0.0 - 0.8   
10*3/uL                                 [facility]  
   
                                                    Monocytes/100 WBC   
(Bld)               6.5 %                                   2.0 - 10.0   
%                                       [facility]  
   
                                                    Platelet distribution   
width (Bld) [Ratio] 16.7 %              High                11.5 - 14.5   
%                                       [facility]  
   
                                                    Platelet mean volume   
(Bld) [Entitic vol] 9.2 fL                                  7.4 - 10.4   
fL                                      [facility]  
   
                                                    Platelets (Bld)   
[#/Vol]             304 10*3/uL                             140 - 440   
10*3/uL                                 [facility]  
   
                          RBC (Bld) [#/Vol] 3.89 10*6/uL              3.80 - 5.2  
0   
10*6/uL                                 [facility]  
   
                          WBC (Bld) [#/Vol] 9.6 10*3/uL               3.6 - 10.7  
   
10*3/uL                                 [facility]  
   
                                                            Test Performed by [facility], [address] [place] [address]   
LAB  
   
                                                                  [facility]  
   
                                                    Comp Metabolic Panelon    
   
                      Calcium [Mass/Vol] 10.1 mg/dL Normal     8.4-10.4   [facility]  
   
                                        Comment on above:   Performed By: #### D  
125O ####  
The performing lab is in the report.  
#### CMP3 ####  
[address] [address]   
   
                                                    ALP [Catalytic   
activity/Vol]   82 U/L          Normal                    [facility]  
   
                                        Comment on above:   Performed By: #### D  
125O ####  
The performing lab is in the report.  
#### CMP3 ####  
[address]    
   
                                                    ALT [Catalytic   
activity/Vol]   14 U/L          Normal          0-34            [facility]  
   
                                        Comment on above:   Result Comment: The   
ALT test is performed by an updated assay   
method.  
Please note that the reference intervals have been  
changed and are now sex specific.   
   
                                                            Performed By: #### D  
125O ####  
The performing lab is in the report.  
#### CMP3 ####  
[facility]  
[address] [place], [place]    
   
                      Anion gap [Moles/Vol] 10 mmol/L  Normal     3-13       [facility]  
   
                                        Comment on above:   Performed By: #### D  
125O ####  
The performing lab is in the report.  
#### CMP3 ####  
[address] [address] [place], [place]    
   
                                                    AST [Catalytic   
activity/Vol]   30 U/L          Normal          15-46           [facility]  
   
                                        Comment on above:   Performed By: #### D  
125O ####  
The performing lab is in the report.  
#### CMP3 ####  
[address] [address] [place], [place]    
   
                      Bilirubin [Mass/Vol] 0.3 mg/dL  Normal     0.2-1.3    [facility]  
   
                                        Comment on above:   Performed By: #### D  
125O ####  
The performing lab is in the report.  
#### CMP3 ####  
[address] [address] [place]    
   
                      CO2 [Moles/Vol] 22 mmol/L  Normal     22-30      [facility]   
System  
   
                                        Comment on above:   Performed By: #### D  
125O ####  
The performing lab is in the report.  
#### CMP3 ####  
[address] [address] [place]    
   
                      Creatinine [Mass/Vol] 0.83 mg/dL Normal     0.52-1.25  [facility]  
   
                                        Comment on above:   Performed By: #### D  
125O ####  
The performing lab is in the report.  
#### CMP3 ####  
[address] [address] [place]    
   
                                                    GFR/1.73 sq   
M.predicted among   
blacks MDRD (S/P/Bld)   
[Vol rate/Area] 78.2 mL/min/{1.73_m2} Normal          >60             [facility]   
System  
   
                                        Comment on above:   Performed By: #### D  
125O ####  
The performing lab is in the report.  
#### CMP3 ####  
[address] [address]   
   
                                                    GFR/1.73 sq   
M.predicted among   
non-blacks MDRD   
(S/P/Bld) [Vol   
rate/Area]      67.5 mL/min/{1.73_m2} Normal          >60             [facility]   
System  
   
                                        Comment on above:   Result Comment: KDIG  
O guidelines provide the following GFR   
categories:  
Stage GFR(ml/min/1.73 m2) Terms  
G1 >=90 Normal or high  
G2 60-89 Mildly decreased*  
G3a 45-59 Mildly to moderately decreased  
G3b 30-44 Moderately to severely decreased  
G4 15-29 Severely decreased  
G5 <15 Kidney failure  
*Relative to young adult level.  
In the absence of evidence of kidney damage, neither GFR  
category G1 nor G2 fulfill the criteria for CKD.  
The CKD-EPI equation is validated in individuals 18 years  
of age and older. Currently the best equation for  
estimating glomerular filtration rate (GFR) from serum  
creatinine in children is the Bedside Alvarez equation.  
It is less accurate in patients with extremes of muscle  
mass, restriction of dietary protein, ingestion of creatine,  
extra-renal metabolism of creatinine, or treatment with  
medications that affect renal tubular creatinine secretion.   
   
                                                            Performed By: #### D  
125O ####  
The performing lab is in the report.  
#### CMP3 ####  
[address]    
   
                      Glucose [Mass/Vol] 106 mg/dL  High            [facility]  
   
                                        Comment on above:   Performed By: #### D  
125O ####  
The performing lab is in the report.  
#### CMP3 ####  
[address]    
   
                      Protein [Mass/Vol] 7.9 g/dL   Normal     6.3-8.2    [facility]  
   
                                        Comment on above:   Performed By: #### D  
125O ####  
The performing lab is in the report.  
#### CMP3 ####  
[address] [address] [place]    
   
                                                    Urea nitrogen   
[Mass/Vol]      19 mg/dL        Normal          9-20            [facility]  
   
                                        Comment on above:   Performed By: #### D  
125O ####  
The performing lab is in the report.  
#### CMP3 ####  
[address] [place]    
   
                      Potassium [Moles/Vol] 3.9 mmol/L Normal     3.5-5.1    [facility]  
   
                                        Comment on above:   Performed By: #### D  
125O ####  
The performing lab is in the report.  
#### CMP3 ####  
[address] [place]    
   
                      Albumin [Mass/Vol] 3.8 g/dL   Normal     3.5-5.0    [facility]  
   
                                        Comment on above:   Performed By: #### D  
125O ####  
The performing lab is in the report.  
#### CMP3 ####  
[address] [address] [place]    
   
                      Chloride [Moles/Vol] 105 mmol/L Normal          [facility]  
   
                                        Comment on above:   Performed By: #### D  
125O ####  
The performing lab is in the report.  
#### CMP3 ####  
[address] [place]    
   
                      Sodium [Moles/Vol] 137 mmol/L Normal     135-145    [facility]  
   
                                        Comment on above:   Performed By: #### D  
125O ####  
The performing lab is in the report.  
#### CMP3 ####  
[address] [place]    
   
                                                    Complete Urinalysison 2022   
   
                      Appearance (U) Turbid     Abnormal   Clear      [facility]  
   
                                        Comment on above:   Result Comment: .   
   
                                                            Performed By: #### C  
UA2 ####  
[address] [address] [place]    
   
                      Bacteria   Few        Abnormal   Negative   [facility]  
   
                                        Comment on above:   Result Comment: .   
   
                                                            Performed By: #### C  
UA2 ####  
[address] [place]    
   
                      Bilirubin,Urine Negative   Normal     Negative   [facility]  
[facility]   
System  
   
                                        Comment on above:   Result Comment: .   
   
                                                            Performed By: #### C  
UA2 ####  
[facility]  
[address] [place], [place]    
   
                      Ca Oxylate Crystals Moderate   Abnormal   Negative   [facility]  
 Health   
System  
   
                                        Comment on above:   Result Comment: .   
   
                                                            Performed By: #### C  
UA2 ####  
[facility]  
[address] [place], [place]    
   
                      Cast, Hyaline Negative   Normal     Negative   [facility]   
System  
   
                                        Comment on above:   Result Comment: .   
   
                                                            Performed By: #### C  
UA2 ####  
[address] [address] [place]    
   
                      Color (U)  Yellow     Normal     [place] [facility]   
System  
   
                                        Comment on above:   Result Comment: .   
   
                                                            Performed By: #### C  
UA2 ####  
[address] [address] [place], [place]    
   
                          Glucose Ql (U) Normal       Normal       Normal   
(<70)                                   [facility]   
System  
   
                                        Comment on above:   Result Comment: .   
   
                                                            Performed By: #### C  
UA2 ####  
[facility]  
[facility] [address] [place], [place]    
   
                      Ketone,Urine Negative   Normal     Negative   [facility]  
   
System  
   
                                        Comment on above:   Result Comment: .   
   
                                                            Performed By: #### C  
UA2 ####  
[address] [address] [address] [place], [place]    
   
                      Leukocytes,Urine 500 Vivien/uL Abnormal   Negative   [facility]  
alth   
System  
   
                                        Comment on above:   Result Comment: .   
   
                                                            Performed By: #### C  
UA2 ####  
[facility]  
[address] [place], [place]    
   
                      Mucous Threads Few        Normal     Negative   [facility]  
   
System  
   
                                        Comment on above:   Result Comment: .   
   
                                                            Performed By: #### C  
UA2 ####  
[facility]  
[address] [place], [place]    
   
                      Nitrites,Urine Positive   Abnormal   Negative   [facility]  
   
System  
   
                                        Comment on above:   Result Comment: .   
   
                                                            Performed By: #### C  
UA2 ####  
[address] [address] [address] [place], [place]    
   
                      Occult Blood,Urine Negative   Normal     Negative   [facility]   
System  
   
                                        Comment on above:   Result Comment: .   
   
                                                            Performed By: #### C  
UA2 ####  
[facility]  
[address] [place], [place]    
   
                      pH,Urine   5.5        Normal     5.0-8.0    [facility]  
   
                                        Comment on above:   Result Comment: .   
   
                                                            Performed By: #### C  
UA2 ####  
[address] [address] [address] [place], [place]    
   
                      RBC, Urine 6 - 10     Abnormal   0-2        [facility]  
   
                                        Comment on above:   Result Comment: .   
   
                                                            Performed By: #### C  
UA2 ####  
[address] [place], [place]    
   
                          Specific Gravity,Urine 1.018        Normal       1.005  
 -   
1.030                                   [facility]  
   
                                        Comment on above:   Result Comment: .   
   
                                                            Performed By: #### C  
UA2 ####  
[address] [address] [address] [address] [place], [place]    
   
                      Squamous Epithelial Negative   Normal     3-5        [facility]  
   
                                        Comment on above:   Result Comment: .   
   
                                                            Performed By: #### C  
UA2 ####  
[address] [place]    
   
                      Total Protein,Urine Negative   Normal     Negative   [facility]  
   
                                        Comment on above:   Result Comment: .   
   
                                                            Performed By: #### C  
UA2 ####  
[address] [address] [place]    
   
                          Urobilinogen,Urine Normal       Normal       Normal   
(0-1)                                   [facility]  
   
                                        Comment on above:   Result Comment: .   
   
                                                            Performed By: #### C  
UA2 ####  
[address] [address] [place]    
   
                                                    WBC LM.HPF (Urine sed)   
[#/Area]        /[HPF]          Abnormal        0-5             [facility]  
   
                                        Comment on above:   Result Comment: .   
   
                                                            Performed By: #### C  
UA2 ####  
[address] [address] [address] [address] [place]    
   
                                                    Comprehensive Metabolic Pane  
lynn 2022   
   
                          Albumin [Mass/Vol] 3.8 g/dL                  3.5 - 5.0  
   
g/dL                                    [facility]  
   
                                                    ALP (Bld) [Catalytic   
activity/Vol]       82 U/L                                  38 - 126   
U/L                                     [facility]  
   
                                                    ALT [Catalytic   
activity/Vol]   14 U/L                          0 - 34 U/L      [facility]  
   
                                        Comment on above:   The ALT test is perf  
ormed by an updated assay method.  
Please note that the reference intervals have been  
changed and are now sex specific.  
  
   
   
                          Anion gap [Moles/Vol] 10 mmol/L                 3 - 13  
   
mmol/L                                  [facility]  
   
                                                    AST [Catalytic   
activity/Vol]   30 U/L                          15 - 46 U/L     [facility]  
   
                          Bilirubin [Mass/Vol] 0.3 mg/dL                 0.2 - 1  
.3   
mg/dL                                   [facility]  
   
                          Calcium [Mass/Vol] 10.1 mg/dL                8.4 - 10.  
4   
mg/dL                                   [facility]  
   
                          Chloride [Moles/Vol] 105 mmol/L                98 - 10  
7   
mmol/L                                  [facility]  
   
                          CO2 [Moles/Vol] 22 mmol/L                 22 - 30   
mmol/L                                  [facility]  
   
                          Creatinine [Mass/Vol] 0.83 mg/dL                0.52 -  
 1.25   
mg/dL                                   [facility]  
   
                                                    EGFR IF NonAfrican   
American        67.5 mL/min                     >60             [facility]  
   
                                        Comment on above:   KDIGO guidelines pro  
vide the following GFR categories:  
Stage GFR(ml/min/1.73 m2) Terms  
G1 >=90 Normal or high  
G2 60-89 Mildly decreased*  
G3a 45-59 Mildly to moderately decreased  
G3b 30-44 Moderately to severely decreased  
G4 15-29 Severely decreased  
G5 <15  Kidney failure  
  
*Relative to young adult level.  
In the absence of evidence of kidney damage, neither GFR  
category G1 nor G2 fulfill the criteria for CKD.  
  
The CKD-EPI equation is validated in individuals 18 years  
of age and older. Currently the best equation for  
estimating glomerular filtration rate (GFR) from serum  
creatinine in children is the Bedside Alvarez equation.  
It is less accurate in patients with extremes of muscle  
mass, restriction of dietary protein, ingestion of creatine,  
extra-renal metabolism of creatinine, or treatment with  
medications that affect renal tubular creatinine secretion.  
  
   
   
                                                    Free PSA/Total PSA   
[Mass fraction]     7.9 g/dL                                6.3 - 8.2   
g/dL                                    [facility]  
   
                                                    GFR/1.73 sq   
M.predicted among   
blacks MDRD (S/P/Bld)   
[Vol rate/Area] 78.2 mL/min/{1.73_m2}                 >60             [facility]  
   
                          Glucose [Mass/Vol] 106 mg/dL    High         70 - 100   
mg/dL                                   [facility]  
   
                                                    Interpretation and   
review of laboratory   
results         Abnormal                                        [facility]  
   
                          Potassium [Moles/Vol] 3.9 mmol/L                3.5 -   
5.1   
mmol/L                                  [facility]  
   
                          Sodium [Moles/Vol] 137 mmol/L                135 - 145  
   
mmol/L                                  [facility]  
   
                                                    Urea nitrogen (BldV)   
[Mass/Vol]          19 mg/dL                                9 - 20   
mg/dL                                   [facility]  
   
                                                    Hemoglobin A1Con 2022   
   
                      Glucose [Mass/Vol] 120 mg/dL  Normal                [facility]  
   
                                        Comment on above:   Performed By: #### P  
T, HEMDF, MONTY, HA1C2 ####[address]    
   
                                                    HbA1c (Bld) [Mass   
fraction]       5.8 %           Abnormal                        [facility]  
   
                                        Comment on above:   Result Comment: Norm  
al less than 5.7%  
Prediabetes 5.7% to 6.4%  
Diabetes 6.5% or higher  
--HgbA1C levels may not be accurate in patients who have  
renal disease, received recent blood transfusions, are anemic,  
or who have dyshemoglobinemia.   
   
                                                            Performed By: #### P  
T, HEMDF, MONTY, HA1C2 ####[address]    
   
                                                    Hemoglobin A1con 2022   
   
                                                    HbA1c (Bld) [Mass   
fraction]       5.8 %           Abnormal                        [facility]  
   
                                        Comment on above:   Normal less than 5.7  
%  
Prediabetes 5.7% to 6.4%  
Diabetes 6.5% or higher  
  
  
--HgbA1C levels may not be accurate in patients who have  
renal disease, received recent blood transfusions, are anemic,  
or who have dyshemoglobinemia.  
  
   
   
                                                    Interpretation and   
review of laboratory   
results         Abnormal                                        [facility]  
   
                      Magnesium [Mass/Vol] 120 mg/dL                        [facility]  
A  
   
                                                            Test Performed by [address]   
[id number]                                                       [facility]   
LAB  
   
                                                                  [facility]  
   
                                                    Hemogram w/ Autodiffon    
   
                      Abs Baso Cnt 0.0 10*3/uL Normal     0.0-0.2    [facility]  
h   
System  
   
                                        Comment on above:   Performed By: #### P  
T, HEMDF, PAB, HA1C2 ####  
[address]    
   
                      Abs Neutrophile Cnt 6.6 10*3/uL Normal     1.8-7.0    [facility]   
[facility]  
   
                                        Comment on above:   Performed By: #### P  
T, HEMDF, PAB, HA1C2 ####  
[address] [address] [address] [place], [place]    
   
                                                    Basophils/100 WBC   
(Bld)           0.4 %           Normal          0.0-2.0         [facility]  
   
                                        Comment on above:   Performed By: #### P  
T, HEMDF, PAB, HA1C2 ####  
[address] [address] [place]    
   
                                                    Eosinophils (Bld)   
[#/Vol]         0.3 10*3/uL     Normal          0.0-0.5         [facility]  
   
                                        Comment on above:   Performed By: #### P  
T, HEMDF, PAB, HA1C2 ####  
[address] [address] [place]    
   
                                                    Eosinophils/100 WBC   
(Bld)           3.0 %           Normal          1.0-6.0         [facility]  
   
                                        Comment on above:   Performed By: #### P  
T, HEMDF, PAB, HA1C2 ####  
[address]    
   
                                                    Erythrocyte   
distribution width   
(RBC) [Ratio]   16.7 %          High            11.5-14.5       [facility]  
   
                                        Comment on above:   Performed By: #### P  
T, HEMDF, PAB, HA1C2 ####  
[address]    
   
                                                    Granulocytes/100 WBC   
(Bld)           68.8 %          Normal          40.0-80.0       [facility]  
   
                                        Comment on above:   Performed By: #### P  
T, HEMDF, PAB, HA1C2 ####  
[address] [place]    
   
                                                    Hematocrit (Bld)   
[Volume fraction] 31.5 %          Low             35.0-47.0       [facility]  
   
                                        Comment on above:   Performed By: #### P  
T, HEMDF, PAB, HA1C2 ####  
[address]    
   
                                                    Hemoglobin (Bld)   
[Mass/Vol]      10.3 g/dL       Low             11.7-16.0       [facility]  
   
                                        Comment on above:   Performed By: #### P  
T, HEMDF, PAB, HA1C2 ####  
[address] [place]    
   
                                                    Lymphocytes (Bld)   
[#/Vol]         2.0 10*3/uL     Normal          1.0-4.3         [facility]  
   
                                        Comment on above:   Performed By: #### P  
T, HEMDF, PAB, HA1C2 ####  
[place] [address] [place]    
   
                                                    Lymphocytes/100 WBC   
(Bld)           21.3 %          Normal          20.0-40.0       [facility]  
   
                                        Comment on above:   Performed By: #### P  
T, HEMDF, PAB, HA1C2 ####  
[address]    
   
                                                    MCH (RBC) [Entitic   
mass]           26.5 pg         Normal          26.0-34.0       [facility]  
   
                                        Comment on above:   Performed By: #### P  
T, HEMDF, PAB, HA1C2 ####  
[place] [address] [place]    
   
                      MCHC       32.8 %     Normal     32.0-36.0  [facility]  
   
                                        Comment on above:   Performed By: #### P  
T, HEMDF, PAB, HA1C2 ####  
[address]    
   
                                                    MCV (RBC) [Entitic   
vol]            81.0 fL         Normal          79.0-98.0       [facility]  
   
                                        Comment on above:   Performed By: #### P  
T, HEMDF, PAB, HA1C2 ####  
[address]    
   
                                                    Monocytes (Bld)   
[#/Vol]         0.6 10*3/uL     Normal          0.0-0.8         [facility]  
   
                                        Comment on above:   Performed By: #### P  
T, HEMDF, PAB, HA1C2 ####  
[address]    
   
                                                    Monocytes/100 WBC   
(Bld)           6.5 %           Normal          2.0-10.0        [facility]  
   
                                        Comment on above:   Performed By: #### P  
T, HEMDF, PAB, HA1C2 ####  
[address] [address] [place]    
   
                                                    Platelet mean volume   
(Bld) [Entitic vol] 9.2 fL          Normal          7.4-10.4        [facility]  
   
                                        Comment on above:   Performed By: #### P  
T, HEMDF, PAB, HA1C2 ####  
[address] [address] [place]    
   
                                                    Platelets (Bld)   
[#/Vol]         304 10*3/uL     Normal          140-440         [facility]  
   
                                        Comment on above:   Performed By: #### P  
T, HEMDF, PAB, HA1C2 ####  
[address] [place]    
   
                      RBC (Bld) [#/Vol] 3.89 10*6/uL Normal     3.80-5.20  [facility]  
   
                                        Comment on above:   Performed By: #### P  
T, HEMDF, PAB, HA1C2 ####  
[address] [place]    
   
                      WBC (Bld) [#/Vol] 9.6 10*3/uL Normal     3.6-10.7   [facility]  
   
                                        Comment on above:   Performed By: #### P  
T, HEMDF, PAB, HA1C2 ####  
[address] [address] [place]    
   
                                                    MRSA by PCRon 2022   
   
                                        Staph Aureus Sc     No S. aureus detecte  
d.  
Negative nasal MRSA PCR   
has a high negative   
predictive  
value for MRSA   
pneumonia. Consider   
stopping vancomycin  
if no other clinical   
indication. Contact   
Antimicrobial  
Stewardship for further   
recommendations.  
The analytical   
performance   
characteristics of this  
assay have been   
determined by [facility] in  
accordance with CLIA   
regulations. The   
modifications  
have not been cleared or   
approved by the [place]   
Food  
and Drug Administration;   
however, the FDA has  
determined that such   
clearance or approval is   
not  
necessary.  
                                                            [facility]  
   
                                                            Test Performed by [address]   
[id number] [place] [address]   
LAB  
   
                                                                  [facility]  
   
                                                    No Panel Informationon    
   
                                                            Test Performed by [address]   
[id number] [place] [address]   
LAB  
   
                                                                  [facility]  
   
                                                    Prealbuminon 2022   
   
                      Prealbumin [Mass/Vol] 19.8 mg/dL Normal     17.6-36.0  [facility]  
   
                                        Comment on above:   Performed By: #### P  
PEARL CONCEPCION PAB, HA1C2 ####  
[address]    
   
                          Prealbumin [Mass/Vol] 19.8 mg/dL                17.6 -  
 36.0   
mg/dL                                   [facility]  
   
                                                            Test Performed by [facility], [address] [place] [address]   
[id number] [place] [address]   
LAB  
   
                                                                  [facility]  
   
                                                    Prothrombin Timeon    
   
                      INR        1.0        Normal     0.9-1.1    [facility]  
   
                                        Comment on above:   Result Comment: [place]  
mmended Anticoagulant Therapy:  
SEE BELOW  
----- INR of 2.0 - 3.0 :  
- Prophylaxis of Venous Thrombosis (high-risk surgery)  
- Treatment of Venous Thrombosis  
- Treatment of Pulmonary Embolism (Includes tissue heart  
valves, Acute Myocardial Infarction to prevent systemic  
embolism, Valvular Heart Disease, and Atrial Fibrillation)  
----- INR of 2.5 - 3.5 :  
- Mechanical Prosthetic Valves (high risk)  
- If oral anticoagulant therapy is used to prevent  
Myocardial Infarction   
   
                                                            Performed By: #### PEARL VOSS PAB, HA1C2 ####  
[address]    
   
                      PT Coag (PPP) [Time] 10.9 s     Normal     9.0-12.0   [facility]  
   
                                        Comment on above:   Result Comment: .   
   
                                                            Performed By: #### P  
TPEARL PAB, HA1C2 ####  
[address]    
   
                                                    Protime-INRon 2022   
   
                                                    INR Coag (Bld)   
[Relative time] 1.0 {INR}                                       [facility]  
   
                                        Comment on above:   Recommended Anticoag  
ulant Therapy:  
SEE BELOW  
----- INR of 2.0 - 3.0 :  
- Prophylaxis of Venous Thrombosis (high-risk surgery)  
- Treatment of Venous Thrombosis  
- Treatment of Pulmonary Embolism (Includes tissue heart  
valves, Acute Myocardial Infarction to prevent systemic  
embolism, Valvular Heart Disease, and Atrial Fibrillation)  
----- INR of 2.5 - 3.5 :  
- Mechanical Prosthetic Valves (high risk)  
- If oral anticoagulant therapy is used to prevent  
Myocardial Infarction  
  
   
   
                          PT Coag (PPP) [Time] 10.9 s                    9.0 - 1  
2.0   
s                                       [facility]  
   
                                        Comment on above:   .   
   
                                                    Staph Aureus Complete Nasalo  
n 2022   
   
                                                    Staph Aureus Complete   
Nasal                                   Staph Screen --> Status:   
F  
No S. aureus detected.  
Negative nasal MRSA PCR   
has a high negative   
predictive  
value for MRSA   
pneumonia. Consider   
stopping vancomycin  
if no other clinical   
indication. Contact   
Antimicrobial  
Stewardship for further   
recommendations.  
The analytical   
performance   
characteristics of this  
assay have been   
determined by [facility] in  
accordance with CLIA   
regulations. The   
modifications  
have not been cleared or   
approved by the [place]   
Food  
and Drug Administration;   
however, the FDA has  
determined that such   
clearance or approval is   
not  
necessary. Negative   
nasal MRSA PCR has a   
high negative predictive  
value for MRSA   
pneumonia. Consider   
stopping vancomycin  
if no other clinical   
indication. Contact   
Antimicrobial  
Stewardship for further   
recommendations.  
The analytical   
performance   
characteristics of this  
assay have been   
determined by [facility] in  
accordance with CLIA   
regulations. The   
modifications  
have not been cleared or   
approved by the [place] S.   
Food  
and Drug Administration;   
however, the FDA has  
determined that such   
clearance or approval is   
not  
necessary.          Normal                                  [facility]  
   
                                        Comment on above:   Performed By: #### S  
APCR ####  
[address] [id number]   
   
                                                    TS GELon 2022   
   
                                        TS GEL              ABO Group:  
A  
Rh, Gel:  
NEG  
Antibody Screen Gel:  
NEG                 Normal                                  [facility]  
   
                                        Comment on above:   Performed By: #### T  
SGL ####[facility]   
   
                                                    TYPE AND SCREENon 2022  
   
   
                      ABO Grouping A                                [facility]  
   
                      Rh Type    Negative                         [facility]  
   
                                                            Test Performed by [facility], [address] [place] [address]   
LAB  
   
                                                                  [facility]  
   
                                                    Urinalysison 2022   
   
                      Appearance (U) Turbid     Abnormal   Clear NA   [facility]  
   
                                        Comment on above:   .   
   
                          Bacteria, UA Few          Abnormal     Negative   
/[HPF]                                  [facility]  
   
                                        Comment on above:   .   
   
                          Bilirubin Urine Negative                  Negative   
mg/dL                                   [facility]  
   
                                        Comment on above:   .   
   
                          Ca Oxalate Lexi, UA Moderate     Abnormal     Negative  
   
/[HPF]                                  [facility]  
   
                                        Comment on above:   .   
   
                          Color (U)    Yellow                    Lt. Yellow   
NA                                      [facility]  
   
                                        Comment on above:   .   
   
                          Glucose, Ur  Normal                    Normal   
(<70) mg/dL                             [facility]  
   
                                        Comment on above:   .   
   
                          Hyaline Casts, UA Negative                  Negative   
/[LPF]                                  [facility]  
   
                                        Comment on above:   .   
   
                                                    Interpretation and   
review of laboratory   
results         Abnormal                                        [facility]  
   
                          Ketones Ql (U) Negative                  Negative   
mg/dL                                   [facility]  
   
                                        Comment on above:   .   
   
                          LEUKOCYTES,           Abnormal     Negative   
Vivien/uL                                  [facility]  
   
                                        Comment on above:   .   
   
                          Mucous Threads Few                       Negative   
/[LPF]                                  [facility]  
   
                                        Comment on above:   .   
   
                      Nitrite, Urine Positive   Abnormal   Negative NA [facility]  
   
                                        Comment on above:   .   
   
                          Occult Blood,Urine Negative                  Negative   
mg/dL                                   [facility]  
   
                                        Comment on above:   .   
   
                      pH (U)     5.5 [pH]                         [facility]  
   
                                        Comment on above:   .   
   
                          RBC, UA      6-10         Abnormal     0 - 2   
/[HPF]                                  [facility]  
   
                                        Comment on above:   .   
   
                                                    Specific Gravity,   
Urine           1.018                                           [facility]  
   
                                        Comment on above:   .   
   
                          Squam Epithel, UA Negative                  3 - 5   
/[HPF]                                  [facility]  
   
                                        Comment on above:   .   
   
                          Total Protein, Urine Negative                  Negativ  
e   
mg/dL                                   [facility]  
   
                                        Comment on above:   .   
   
                          Urobilinogen, Urine Normal                    Normal   
(0-1) mg/dL                             [facility]  
   
                                        Comment on above:   .   
   
                          WBC, UA      >100         Abnormal     0 - 5   
/[HPF]                                  [facility]  
   
                                        Comment on above:   .   
   
                                                            Test Performed by [facility], [address] [place] [address]   
[id number] [place] [address]   
LAB  
   
                                                                  [facility]  
   
                                                    NM CARDIAC PERF STRESS/PHARM  
on 2022   
   
                                                    NM CARDIAC PERF   
STRESS/PHARM                            * * *Final Report* * *  
DATE OF EXAM: 2022 9:12AM  
[facility] 0006 - NM CARDIAC   
PERF STRESS/PHARM /   
ACCESSION # [id number]  
PROCEDURE REASON:   
multiple diagnoses  
  
* * * * Physician   
Interpretation * * * *  
PATIENT:  
Name: MS. ELMIRA WARNER  
MRN: [id number]  
Age: 77 years  
Gender: F  
CONCLUSIONS:  
1. SPECT Perfusion   
Study: Normal.  
2. There is no   
scintigraphic evidence   
for inducible ischemia.  
3. No evidence of   
scarred myocardium.   
There is a moderate   
sized apparent  
perfusion defect of the   
inferior mid and basilar   
segments that contracts  
normally favoring   
artifact.  
4. Left ventricle is   
normal in size. The left   
ventricle systolic  
function is normal.  
5. Right ventricle is   
normal in size. The   
right ventricle systolic  
function is normal.  
6. This is a low risk   
scan.  
Gated Stress FBP  
LVEF % 77  
Prior Study Comparison   
No prior nuclear   
cardiology exam   
available for  
comparison.  
Nuclear Med Report:1-Day   
Tc-Tetrofosmin Gated   
SPECT Myocardial   
Perfusion  
with Regadenoson Stress:   
Myocardial perfusion   
imaging was performed at  
rest 30 minutes   
following the IV   
injection of Tc-99m   
tetrofosmin. The  
patient received 0.4 mg   
of regadenoson, via   
rapid IV push,   
immediately  
followed by Tc-99m   
tetrofosmin IV. Gated   
post stress tomographic   
imaging  
was performed 30 to 60   
minutes later. See   
administered doses   
below.  
[facility]  
Date of service:   
2022 6:30:00 AM  
Accession #: [id number]  
Ordering Physician:   
JOLENE LIANG.  
Requesting Physician:  
Indication: Assessment   
for known CAD and Preop   
eval for noncard surg   
with  
high risk, poor exercise   
tolerance  
Fellow: Dov Oh MD  
Interpreting physician:   
Carlos Mcgowan MD  
Previous Cardiovascular   
Interventions:  
PCI () : THEA TO LAD   
LCX RCA  
PCI () : THEA TO RCA  
Height: 160.00 cm BSA:   
1.83 m?  
Weight: 75.00 kg BMI:   
29.3 kg/m?  
Imaging Protocol  
Limitation Reason   
Patient motion and   
Diaphragmatic   
attenuation.  
Exam Type: Rest Stress  
Radiopharm: Tc-99m   
Tetrofosmin Tc-99m   
Tetrofosmin  
Dosage(mCi): 12.3 31.8  
Stress Agent:   
Regadenoson 0.4mg Supply   
provided from  
Central  
Pharmacy  
Resting Blood Press:   
155/92 mmHg  
Image Quality  
The overall study   
imaging quality was   
deemed to be fair. The   
following  
technical issues were   
noted: Patient motion   
and Diaphragmatic   
attenuation.  
FINDINGS:  
Left Ventricle  
Wall Motion:  
Stress IR:3D - All   
segments are normal.  
Rest IR:3D -  
Gated Stress FBP -  
Reversibility -  
Stress IR:3D  
Stress IR:3D Gated   
Stress FBP  
LVEF: 77 %  
ED Volume: 75 ml  
ES Volume: 17 ml  
TID: 1.17  
Perfusion Findings  
Stress IR:3D - Summed   
Score=0  
All segments demonstrate   
normal perfusion.  
Rest IR:3D - Summed   
Score=0  
All segments demonstrate   
normal perfusion.  
Stress IR:3D Rest IR:3D  
Summed Score=0 Summed   
Score=0  
LEFT VENTRICLE  
The left ventricle is   
normal in size. Left   
ventricular systolic   
function  
is normal.  
Right Ventricle  
The right ventricle is   
normal in size. Right   
ventricle systolic   
function  
is normal.  
Stress Test Findings:  
There is no   
scintigraphic evidence   
for inducible ischemia.   
There is no  
evidence of scarring.  
The left ventricular   
cavity size is unchanged   
with stress.  
Electronically signed by   
Carlos Mcgowan MD on   
2022 at 10:12:12 AM  
*** Final ***  
------------------------  
------------------------  
--  
Stress ECG Report:  
[facility]  
Date of service:   
2022 6:30:00 AM  
Accession #: [id number]  
Ordering physician:   
JOLENE LIANG  
Exercise specialist:   
Kanika Parr RN  
Assistant: Allyson Villatoro RN  
Fellow: Dov Oh MD  
Interpreting physician:   
Irlanda Lambert MD  
Patient name: MS. ELMIRA WARNER  
MRN: [id number]  
Age: 77 years  
Gender: F  
Height: 160.00 cm BSA:   
1.83 m?  
Weight: 75.00 kg BMI:   
29.3 kg/m?  
Indication: Encounter   
for screening for   
cardiovascular disorders   
and  
History of percutaneous   
coronary angioplasty  
Stress ECG Conclusion:  
Conclusion: Normal  
Comments: Perfusion   
images to be interpreted   
separately  
Stress ECG Summary:  
The patient's resting   
heart rate was 74 bpm   
and blood pressure was   
155/92  
mmHg. The test was   
terminated due to end of   
protocol. Other symptoms  
during the test included   
chest discomfort,   
headache, nausea and   
throat  
discomfort. The maximum   
heart rate was 104 bpm,   
which is 73% of the  
predicted heart rate for   
age. Peak blood pressure   
was 152/74 mmHg. The  
double product achieved   
was 37826.  
Previous cardiovascular   
interventions:  
PCI () : THEA TO LAD   
LCX RCA  
PCI () : THEA TO RCA  
Medications:  
Last Used  
PREOVENTIL CRESTOR MOBIC   
ZINC LISINOPRIL MAG OX   
K+  
Resting ECG: Normal   
Sinus Rhythm, Sinus   
Arrhythmia, Rare PACs   
(<3/Min)  
and Rare PVCs (<3/Min)  
Symptoms at rest: No   
symptoms  
Pharamcologic Protocol:   
Regadenoson  
Stress Exercise Table:  
+-----+---+---+---+  
 Stage HR  SYS ALBERTO   
+-----+---+---+---+  
 1  104 128 65    
+-----+---+---+---+  
 2  99  137 56    
+-----+---+---+---+  
 3  92  145 66    
+-----+---+---+---+  
  (more content not   
included)...        Normal                                  [facility]  
   
                                                    MRI CERVICAL SPINE MARTHA Villagran 2022   
   
                                                            Patient Name: ELMIRA ALMARAZ  
  
  
MRN: [id number]  
  
  
Acct#: [id number]  
  
  
Magnetic Resonance   
Imaging  
  
  
ACCESSION EXAM DATE/TIME   
PROCEDURE ORDERING   
PROVIDER  
[id number] 3/16/2022   
08:22 EDT MRI Spine   
Cervical w/o JO ANN BONILLA  
Contrast  
  
  
CPT code  
97915  
  
  
Reason For Exam  
(MRI Spine Cervical w/o   
Contrast) see diagnosis  
  
  
Report  
  
Examination:  
MRI cervical spine  
  
Clinical Indication:  
Radiculopathy  
  
Comparison:  
Correlation with   
radiographs 2022  
  
Findings:  
Multiplanar   
multisequence MRI images   
were obtained through   
the cervical spine  
without intravenous   
gadolinium.  
  
Straightening of the   
normal cervical   
lordosis. 3 mm C4-C5 and   
2 mm C5-C6 and  
C7-T1 anterolisthesis. 3   
mm C6-C7 retrolisthesis.   
Multilevel   
intervertebral disc  
space narrowing with   
reactive vertebral body   
endplate changes,   
especially at  
C5-C6 and C6-C7.   
Otherwise the cervical   
spine demonstrates has   
normal signal  
intensity. The vertebral   
body heights are   
maintained. No abnormal   
cord signal  
intensity.  
  
Nonspecific supraspinous   
soft tissue edema from   
approximately C5 to the   
C7 T1  
level; correlate with   
strain.  
  
Craniocervical and   
atlantoaxial   
articulations are within   
normal limits.  
  
C2-C3: No disc bulge or   
disc protrusion. No   
central spinal canal   
stenosis. No  
neural foraminal   
narrowing.  
  
  
C3-C4: 3 mm broad-based   
disc osteophyte complex,   
extending laterally.   
Facet  
arthropathy contributes   
to moderate-severe   
bilateral neural   
foraminal narrowing.  
Mild central canal   
stenosis.  
  
C4-C5: 3 mm   
anterolisthesis,   
slightly uncovering the   
posterior aspect of C5.   
No  
disc bulge or   
protrusion. Facet   
arthropathy and   
ligamentous hypertrophy  
contributes to mild   
central canal stenosis.   
Moderate to severe   
bilateral neural  
foraminal narrowing.  
  
C5-C6: 2 mm   
anterolisthesis. No disc   
bulge or protrusion.   
Trace marginal  
osteophytosis. Facet   
arthropathy, left   
greater than right, and   
ligamentous  
hypertrophy contributes   
to mild central canal   
stenosis.   
Moderate-severe  
bilateral neural   
foraminal narrowing,   
left greater than right.  
  
Magnetic Resonance   
Imaging  
  
  
Report  
C6-C7: 2 mm   
retrolisthesis. 3 mm   
broad-based disc   
osteophyte complex.   
Facet  
arthropathy and   
ligamentous hypertrophy   
contributes to complete   
thecal sac  
effacement-severe   
central canal stenosis.   
Slight cord flattening.   
Severe  
bilateral neural   
foraminal narrowing.  
  
C7-T1: 2 mm   
anterolisthesis. Facet   
arthropathy contributes   
to mild bilateral  
neural foraminal   
narrowing. No central   
canal stenosis.  
  
Impression:  
1. Multilevel   
degenerative change.   
Nonspecific supraspinous   
soft tissue edema  
from approximately C5 to   
the C7 T1 level;   
correlate with strain.  
2. At C3-C4 at C4-C5   
there is mild central   
canal stenosis.   
Moderate-severe  
bilateral foraminal   
narrowing.  
3. At C5-C6 there is   
mild central canal   
stenosis. Moderate to   
severe bilateral  
neural foraminal   
narrowing.  
4. At C6-C7 there is   
severe central canal   
stenosis, slight cord   
flattening, and  
complete thecal sac   
effacement. Severe   
bilateral neural   
foraminal narrowing.  
5. At C7-T1 there is   
mild bilateral neural   
foraminal narrowing.  
Report Dictated on   
Workstation:   
[id number]  
  
---** Final ---**  
  
  
Dictating Physician:   
MD CARREON JASON  
Signed Date and Time:   
2022 11:25 am  
Signed by: MD CARREON JASON  
Transcribed Date and   
Time: 2022 11:26                                         [facility]  
Work Phone:   
[phone]  
   
                                                            Suresh Carreon MD  
 -   
2022 Formatting of   
this note might be   
different from the   
original.  
Patient Name: ELMIRA WARNER  
  
  
MRN: [id number]  
  
  
Acct#: [id number]  
  
  
Magnetic Resonance   
Imaging  
  
  
ACCESSION EXAM DATE/TIME   
PROCEDURE ORDERING   
PROVIDER  
[id number] 3/16/2022   
08:22 EDT MRI Spine   
Cervical w/o JO ANN BONILLA  
Contrast  
  
  
CPT code  
[id number]  
  
  
Reason For Exam  
(MRI Spine Cervical w/o   
Contrast) see diagnosis  
  
  
Report  
  
Examination:  
MRI cervical spine  
  
Clinical Indication:  
Radiculopathy  
  
Comparison:  
Correlation with   
radiographs 2022  
  
Findings:  
Multiplanar   
multisequence MRI images   
were obtained through   
the cervical spine  
without intravenous   
gadolinium.  
  
Straightening of the   
normal cervical   
lordosis. 3 mm C4-C5 and   
2 mm C5-C6 and  
C7-T1 anterolisthesis. 3   
mm C6-C7 retrolisthesis.   
Multilevel   
intervertebral disc  
space narrowing with   
reactive vertebral body   
endplate changes,   
especially at  
C5-C6 and C6-C7.   
Otherwise the cervical   
spine demonstrates has   
normal signal  
intensity. The vertebral   
body heights are   
maintained. No abnormal   
cord signal  
intensity.  
  
Nonspecific supraspinous   
soft tissue edema from   
approximately C5 to the   
C7 T1  
level; correlate with   
strain.  
  
Craniocervical and   
atlantoaxial   
articulations are within   
normal limits.  
  
C2-C3: No disc bulge or   
disc protrusion. No   
central spinal canal   
stenosis. No  
neural foraminal   
narrowing.  
  
  
C3-C4: 3 mm broad-based   
disc osteophyte complex,   
extending laterally.   
Facet  
arthropathy contributes   
to moderate-severe   
bilateral neural   
foraminal narrowing.  
Mild central canal   
stenosis.  
  
C4-C5: 3 mm   
anterolisthesis,   
slightly uncovering the   
posterior aspect of C5.   
No  
disc bulge or   
protrusion. Facet   
arthropathy and   
ligamentous hypertrophy  
contributes to mild   
central canal stenosis.   
Moderate to severe   
bilateral neural  
foraminal narrowing.  
  
C5-C6: 2 mm   
anterolisthesis. No disc   
bulge or protrusion.   
Trace marginal  
osteophytosis. Facet   
arthropathy, left   
greater than right, and   
ligamentous  
hypertrophy contributes   
to mild central canal   
stenosis.   
Moderate-severe  
bilateral neural   
foraminal narrowing,   
left greater than right.  
  
Magnetic Resonance   
Imaging  
  
  
Report  
C6-C7: 2 mm   
retrolisthesis. 3 mm   
broad-based disc   
osteophyte complex.   
Facet  
arthropathy and   
ligamentous hypertrophy   
contributes to complete   
thecal sac  
effacement-severe   
central canal stenosis.   
Slight cord flattening.   
Severe  
bilateral neural   
foraminal narrowing.  
  
C7-T1: 2 mm   
anterolisthesis. Facet   
arthropathy contributes   
to mild bilateral  
neural foraminal   
narrowing. No central   
canal stenosis.  
  
Impression:  
1. Multilevel   
degenerative change.   
Nonspecific supraspinous   
soft tissue edema  
from approximately C5 to   
the C7 T1 level;   
correlate with strain.  
2. At C3-C4 at C4-C5   
there is mild central   
canal stenosis.   
Moderate-severe  
bilateral foraminal   
narrowing.  
3. At C5-C6 there is   
mild central canal   
stenosis. Moderate to   
severe bilateral  
neural foraminal   
narrowing.  
4. At C6-C7 there is   
severe central canal   
stenosis, slight cord   
flattening, and  
complete thecal sac   
effacement. Severe   
bilateral neural   
foraminal narrowing.  
5. At C7-T1 there is   
mild bilateral neural   
foraminal narrowing.  
Report Dictated on   
Workstation:   
[id number]  
  
---** Final ---**  
  
  
Dictating Physician:   
MD CARREON JASON  
Signed Date and Time:   
2022 11:25 am  
Signed by: MD CARREON JASON  
Transcribed Date and   
Time: 2022 11:26                                         [facility]  
Work Phone:   
[phone]  
   
                                                    Radiology Study   
observation   
(narrative)                                                     [facility]  
Work Phone:   
[phone]  
   
                                                    MRI CERVICAL SPINE WO CONTRA  
STOrdered By: Suresh Carreon on 2022   
   
                                                                  [facility]  
Work Phone:   
[phone]  
   
                                                    MRI Spine Cervical w/o Contr  
ama 2022   
   
                                                    MRI Spine Cervical w/o   
Contrast                                Patient Name: ELMIRA WARNER  
MRN: [id number]  
Acct#: [id number]  
Magnetic Resonance   
Imaging  
ACCESSION EXAM DATE/TIME   
PROCEDURE ORDERING   
PROVIDER  
[id number] 3/16/2022   
08:22 EDT MRI Spine   
Cervical w/o CHAD   
JO ANN DUONG  
Contrast  
CPT code  
26035  
Reason For Exam  
(MRI Spine Cervical w/o   
Contrast) see diagnosis  
Report  
Examination:  
MRI cervical spine  
Clinical Indication:  
Radiculopathy  
Comparison:  
Correlation with   
radiographs 2022  
Findings:  
Multiplanar   
multisequence MRI images   
were obtained through   
the cervical spine  
without intravenous   
gadolinium.  
Straightening of the   
normal cervical   
lordosis. 3 mm C4-C5 and   
2 mm C5-C6 and  
C7-T1 anterolisthesis. 3   
mm C6-C7 retrolisthesis.   
Multilevel   
intervertebral disc  
space narrowing with   
reactive vertebral body   
endplate changes,   
especially at  
C5-C6 and C6-C7.   
Otherwise the cervical   
spine demonstrates has   
normal signal  
intensity. The vertebral   
body heights are   
maintained. No abnormal   
cord signal  
intensity.  
Nonspecific supraspinous   
soft tissue edema from   
approximately C5 to the   
C7 T1  
level; correlate with   
strain.  
Craniocervical and   
atlantoaxial   
articulations are within   
normal limits.  
C2-C3: No disc bulge or   
disc protrusion. No   
central spinal canal   
stenosis. No  
neural foraminal   
narrowing.  
C3-C4: 3 mm broad-based   
disc osteophyte complex,   
extending laterally.   
Facet  
arthropathy contributes   
to moderate-severe   
bilateral neural   
foraminal narrowing.  
Mild central canal   
stenosis.  
C4-C5: 3 mm   
anterolisthesis,   
slightly uncovering the   
posterior aspect of C5.   
No  
disc bulge or   
protrusion. Facet   
arthropathy and   
ligamentous hypertrophy  
contributes to mild   
central canal stenosis.   
Moderate to severe   
bilateral neural  
foraminal narrowing.  
C5-C6: 2 mm   
anterolisthesis. No disc   
bulge or protrusion.   
Trace marginal  
osteophytosis. Facet   
arthropathy, left   
greater than right, and   
ligamentous  
hypertrophy contributes   
to mild central canal   
stenosis.   
Moderate-severe  
bilateral neural   
foraminal narrowing,   
left greater than right.  
Magnetic Resonance   
Imaging  
Report  
C6-C7: 2 mm   
retrolisthesis. 3 mm   
broad-based disc   
osteophyte complex.   
Facet  
arthropathy and   
ligamentous hypertrophy   
contributes to complete   
thecal sac  
effacement-severe   
central canal stenosis.   
Slight cord flattening.   
Severe  
bilateral neural   
foraminal narrowing.  
C7-T1: 2 mm   
anterolisthesis. Facet   
arthropathy contributes   
to mild bilateral  
neural foraminal   
narrowing. No central   
canal stenosis.  
Impression:  
1. Multilevel   
degenerative change.   
Nonspecific supraspinous   
soft tissue edema  
from approximately C5 to   
the C7 T1 level;   
correlate with strain.  
2. At C3-C4 at C4-C5   
there is mild central   
canal stenosis.   
Moderate-severe  
bilateral foraminal   
narrowing.  
3. At C5-C6 there is   
mild central canal   
stenosis. Moderate to   
severe bilateral  
neural foraminal   
narrowing.  
4. At C6-C7 there is   
severe central canal   
stenosis, slight cord   
flattening, and  
complete thecal sac   
effacement. Severe   
bilateral neural   
foraminal narrowing.  
5. At C7-T1 there is   
mild bilateral neural   
foraminal narrowing.  
Report Dictated on   
Workstation:   
[id number]  
***** Final *****  
  
  
Dictating Physician:   
MD CARREON JASON  
Signed Date and Time:   
2022 11:25 am  
Signed by: MD CARREON JASON  
Transcribed Date and   
Time: 2022 11:26 Normal                                  [facility]  
   
                                                    CR Spine Cervical 2 or 3 Vie  
wson 2022   
   
                                                    CR Spine Cervical 2 or   
3 Views                                 Patient Name: ELMIRA WARNER  
MRN: [id number]  
Acct#: [id number]  
Diagnostic Radiology  
ACCESSION EXAM DATE/TIME   
PROCEDURE ORDERING   
PROVIDER  
[id number] 2022   
16:20 EST CR Spine   
Cervical 2 or 3   
JO ANN BONILLA S  
Views  
CPT code  
[id number]  
Reason For Exam  
(CR Spine Cervical 2 or   
3 Views) FLEX and EXT   
ONLY  
Report  
CERVICAL SPINE,   
FLEXION/EXTENSION VIEWS:  
INDICATION: Neck pain  
COMPARISON: No previous   
studies are available   
for comparison.  
TECHNIQUE: Flexion and   
extension lateral views   
are submitted for   
interpretation.  
FINDINGS: Alignment,   
curvature and   
segmentation of the   
cervical spine are  
within normal limits.   
The prevertebral soft   
tissues are intact. 4 mm   
of  
subluxation is present   
at C4-C5 and C5-C6 with   
5 mm of anterolisthesis   
at C7-T1.  
Disc space narrowing is   
present at C5-C6 and   
C6-C7. There is no   
instability  
with flexion or   
extension.  
IMPRESSION: Multilevel   
arthritic changes.  
Report Dictated on   
Workstation:   
[id number]  
***** Final *****  
  
  
Dictating Physician:   
DO HOUSE ALFRED  
Signed Date and Time:   
2022 11:09 pm  
Signed by: DO HOUSE ALFRED  
Transcribed Date and   
Time: 2022 11:11 [facility] 2022   
   
                                        [facility]                Telephone (AGCARDHWG  
)  
------------------------  
--------  
TIMMYELMIRA C   
([id number]) 1945   
F  
Date Time Provider   
Department  
22 IRLANDA CHAN AGCARDHWG  
During your visit today,   
we recorded the   
following information   
about you:  
JACKIE Abebe   
2022 2:29 PM Signed  
----- Message from   
Sara Ibanez sent at   
2022 1:54 PM EST   
-----  
Regarding: New patient   
with Dr. Chan  
Subject Line Format:   
Cardiology/ Dr. Chan/   
New patient appointment  
Patient has been   
identified by name and   
Date of birth (Y/N): Y  
Patient: Elmira MUÑOZ   
Timmy  
YOB: 1945  
MRN: [id number]  
Provider for this   
encounter: Bebeto Rodriguez DO  
Reason for the   
call/escalation: Patient   
needs a new patient   
appointment with  
Dr. Chan  
Was Patient Referred to   
[facility]   
Treatment (Y/N): N/A  
Did Patient Agree (Y/N):   
N/A  
Was An Attempt Made To   
Transfer The Patient To   
The Office (Y/N): N  
Were You Able To Reach   
Someone At The Office   
(Y/N): N/A  
If Yes - Patient Was   
Transferred To   
(Caregivers Name): N/A  
If No - Which [facility]   
Leadership Team Member   
Did You Speak With   
Regarding This  
Patient: N/A  
Was an appointment   
scheduled (Y/N): N  
Reason patient was   
requesting visit   
(RFV/signs and   
symptoms/diagnosis) :  
Patient needs a new   
patient appointment with   
Dr. Chan  
Person calling if other   
than patient: no  
Return call to if other   
than patient: no  
Best contact number:   
[phone] or pt's   
daughter Marquise at   
[phone]  
Thank you,  
Sara Ibanez  
2022 1:55   
PM  
Chirag Murray JACKIE   
2022 2:30 PM Signed  
Called patient, she   
stated she wanted to   
schedule with Dr Chan   
in the  
[place] office. I   
provided the patient   
with the number for   
[place].  
Chirag Murray  
2022 2:30   
PM  
Allergies As of Date:   
2022 Noted Allergy   
Reaction  
ADHESIVE TAPE-SILICONES   
01/15/2020 2 - Rash  
DEMEROL (MEPERIDINE   
(PF)) 2012 11 -   
Vomiting  
Comments: migraine  
INFLUENZA VACCINE TRI-SP   
-10 2012 6 -   
Diarrhea  
Comments: vomiting/fever  
IODINE 2012 14 -   
Other: See Comments  
Comments: sick   
/headaches  
PENICILLINS 2012 4   
- Hives  
Comments: throat and   
mouth swelling  
SHELLFISH 2012 4 -   
Hives  
Date Reviewed:   
2022  
Reviewed by: UMER King.CNP -   
Fully Assessed  
Reason for Visit:  
Appointment [186]  
Prescriptions as of   
2022  
- vit B complex   
no.12/niacin,B3,   
(VITAMIN B COMPLEX   
NO.12-NIACIN ORAL)  
Take by mouth.  
-   
TUMERIC-GING-OLIVE-OREG-  
CAPRYL ORAL  
Take by mouth.  
- zinc sulfate (ZINC-15   
ORAL)  
Take by mouth.  
- lisinopril (ZESTRIL,   
PRINIVIL) 5 mg tablet  
Take 0.5 tablets by   
mouth once daily.  
- polyethylene glycol   
3350 (MIRALAX, GLYCOLAX)   
17 gram/dose powder  
Use as directed for   
Miralax / Gatorade Bowel   
Prep Kit  
- Gatorade Sports Drink  
Use as directed for   
Miralax / Gatorade Bowel   
Prep Kit  
- Bisacodyl (DULCOLAX) 5   
mg tab  
Use as directed for   
Miralax / Gatorade Bowel   
Prep Kit  
- levalbuterol tartrate   
HFA (XOPENEX HFA) 45   
mcg/actuation inhaler  
Inhale 2 Puffs as   
instructed every 6 hours   
as needed.  
- Nebulizers (AERONEB GO   
NEBULIZER)  
1 Each as directed.   
Portable Nebulizer with   
tubing, Dx: mild   
persistent asthma  
- ubidecarenone Q-10 (CO   
Q-10) 10 mg cap  
Take 10 mg by mouth once   
daily.  
- magnesium oxide   
(MAG-OX) 400 mg (241.3   
mg magnesium) tablet  
Take 400 mg by mouth   
once daily.  
- Potassium 99 mg tab  
Take 1 tablet by mouth   
once daily.  
- Lactobac   
no.41/Bifidobact no.7   
(PROBIOTIC-10 ORAL)  
Take by mouth.  
- nitroglycerin   
sublingual (NITROSTAT)   
0.4 mg SL tablet  
Dissolve 1 tablet under   
the tongue every 5   
minutes as needed for   
Chest Pain.  
Max of 3 doses. If no   
relief, call 911.  
- potassium chloride   
(K-TAB) 10 mEq tablet  
Take 1 tablet by mouth   
daily with breakfast.  
- albuterol (PROVENTIL)   
2.5 mg /3 mL (0.083 %)   
nebulizer solution  
Use 3 mL via nebulizer   
every 6 hours as needed.  
- MULTIVITAMIN (VITAMIN   
A DAY ORAL)  
Take by mouth.  
- Cholecalciferol,   
Vitamin D3, (VITAMIN D)   
1,000 unit Cap  
Take 1,000 Units by   
mouth once daily.  
- Ascorbic Acid (VITAMIN   
C) 1,000 mg tablet  
Take 1,000 mg by mouth   
once daily.  
- Aspirin 81 mg Tab  
Take 81 mg by mouth once   
daily.  
- clopidogrel (PLAVIX)   
75 mg tablet  
Take 75 mg by mouth once   
daily.  
- multivitamin tablet  
Take 1 tablet by mouth   
once daily.  
- GLUC COLBERT/CHONDRO COLBERT   
A/VIT C/MN (GLUCOSAMINE   
1500 COMPLEX ORAL)  
Take by mouth.  
- CALCIUM   
CARBONATE/VITAMIN D3   
(CALCIUM + D ORAL)  
Take by mouth.  
Problem List As Of Date   
2022 Noted   
Resolved  
Hyperlipidemia, mixed   
[E78.2] 01/15/2020  
Coronary artery disease   
involving native heart   
*01/15/2020  
Hypokalemia [E87.6]   
01/15/2020  
Asymptomatic   
postmenopausal status   
[Z78.0] 01/15/2020  
TIA (transient ischemic   
attack) [G45.9]   
01/15/2020  
Mild intermittent   
asthma, uncomplicated   
[J45.20]01/15/2020  
Essential hypertension   
[I10] 01/15/2020  
Encounter Number:   
[id number]  
Encounter Status:Closed   
by CHIRAG MURRAY on    
(more content not   
included)...        Normal                                  [facility]  
   
                                                    Progress Noteson 2019   
   
                                        Protein mass conc   Encounter Department  
:   
[facility] SPORTS   
MEDICINE  
Progress Notes by   
ALTAGRACIA Disla   
at 2019 9:00 AM  
Author: ROMAN Dislaervice:   
(none)Author Type:   
Trainer  
Filed: 2019 11:08   
AMEncounter Date:   
2019Status: Signed  
: ALTAGRACIA Disla (Trainer)  
Patient was provided   
with a handout of   
Med[employer] exercises   
during their visit   
today. The Patient  
was instructed on how to   
properly perform all   
exercises and had no   
further questions. An   
access  
code, to videos of each   
exercise, was also   
provided.  
Access Code: [email or web address]  
URL:   
[email or web address]  
[email or web address]  
Date: 2019  
Prepared by: (TS) ALTAGRACIA Martinez  
Exercises  
Supine Heel Slide with   
Strap - 10 Reps - 10   
Hold (sec) - 3x daily  
Supine Quad Set - 10   
Reps - 10 Hold (sec) -   
3x daily  
Seated Knee Extension   
Stretch with Chair - 10   
Reps - 10 Hold (sec) -   
3x daily  
Standing Terminal Knee   
Extension with   
Resistance - 10 reps - 3   
sets - 5 hold - 2x daily   
- 5x weekly  
Supine Active Straight   
Leg Raise - 10 reps - 3   
sets - 5 hold - 2x daily   
- 5x weekly  
Prone Hip Extension - 10   
reps - 3 sets - 5 hold -   
2x daily - 5x weekly  
Sidelying Hip Abduction   
- 10 reps - 3 sets - 5   
hold - 2x daily - 5x   
weekly  
Patient received a   
Donjoy Brace with   
instruction and   
demonstration for proper   
use and fit  
Type: hinged knee  
Size: large  
Return process is   
located at the bottom of   
the pink sheet that you   
received at the time of   
your  
visit  
Questions regarding fit   
and use should be   
addressed as soon as the   
issue is noticed    Normal                                  [facility]  
   
                                        Protein mass conc   Encounter Department  
:   
[facility] SPORTS   
MEDICINE  
Progress Notes by Irlanda Fuchs MD at   
2019 9:00 AM  
Author: HERLINDA Zelayaervice: (none)Author   
Type: Physician  
Filed: 2019 11:08   
AMEncounter Date:   
2019Status: Signed  
: Irlanda Fuchs MD (Physician)  
Subjective:  
Patient ID: Elmira Warner is a 73 y.o.   
female.  
Chief Complaint  
Patient presents with  
-Knee Pain  
twisted left knee 3   
weeks ago  
Knee Pain  
This is a new problem.   
The current episode   
started 1 to 4 weeks ago   
(72 yo female c/o left   
knee  
pain, swelling, and   
difficulty walking s/p   
twisting knee injury 3   
weeks ago. Pt. took   
misstep  
chasing 10 mo grandchild   
and had acute pain. No   
pop or crack. Has PMH of   
OA knees. Here for   
eval.).  
The problem occurs   
constantly. The problem   
has been waxing and   
waning (Pain described   
as sharp,  
aching, throbbing,   
shooting. Rated 8/10 at   
worst; 2/10 at best.   
Mild to severe   
intensity. Located  
lateral aspect,   
popliteal fossa, and   
behind patella.).   
Associated symptoms   
include arthralgias,  
joint swelling, myalgias   
and weakness. Pertinent   
negatives include no   
chills, congestion,   
coughing,  
fever, headaches,   
nausea, numbness, rash   
or vomiting. Associated   
symptoms comments: +   
knee limited  
ROM  
+ knee stiffness  
+ difficulty walking  
+ knee crepitance  
- knee clicking  
- knee catching  
- knee instability  
- knee buckling  
- knee popping. The   
symptoms are aggravated   
by walking and bending   
(weight bearing, direct  
pressure, climbing   
stairs). She has tried   
rest, immobilization,   
ice and NSAIDs (ACE   
wrap, Neoprene  
knee sleeve) for the   
symptoms. The treatment   
provided no relief.  
Review of Systems  
Constitutional: Negative   
for chills and fever.  
HENT: Negative for   
congestion and   
rhinorrhea.  
Eyes: Negative for   
visual disturbance.  
Respiratory: Negative   
for cough.  
Cardiovascular: Negative   
for leg swelling.  
Gastrointestinal:   
Negative for nausea and   
vomiting.  
Genitourinary: Negative   
for difficulty   
urinating.  
Musculoskeletal:   
Positive for   
arthralgias, gait   
problem, joint swelling   
and myalgias.  
See HPI  
Skin: Negative for color   
change and rash.  
Neurological: Positive   
for weakness. Negative   
for numbness and   
headaches.  
Psychiatric/Behavioral:   
Negative for agitation   
and sleep disturbance.  
Objective:  
Physical Exam  
Constitutional: She is   
oriented to person,   
place, and time. She   
appears well-developed   
and  
well-nourished. No   
distress.  
HENT:  
Head: Normocephalic and   
atraumatic.  
Eyes: Conjunctivae and   
EOM are normal.  
Pulmonary/Chest: Effort   
normal. No respiratory   
distress.  
Musculoskeletal:  
Right knee: She exhibits   
no effusion.  
Left knee: She exhibits   
no effusion.  
Right lower leg: Normal.   
She exhibits no   
tenderness, no bony   
tenderness, no swelling,   
no  
edema, no deformity and   
no laceration.  
Left lower leg: She   
exhibits tenderness. She   
exhibits no bony   
tenderness, no swelling,   
no  
edema, no deformity and   
no laceration.  
Legs:  
Neurological: She is   
alert and oriented to   
person, place, and time.   
No sensory deficit. She  
exhibits normal muscle   
tone. Gait abnormal.  
Antalgic gait  
Skin: Skin is warm and   
dry. No bruising and no   
ecchymosis noted. She is   
not diaphoretic. No  
cyanosis or erythema.   
Nails show no clubbing.  
Scattered spider veins   
BLE  
Psychiatric: She has a   
normal mood and affect.   
Her speech is normal and   
behavior is normal.  
Vitals reviewed.  
Right Knee Exam  
Tenderness  
The patient is   
experiencing tenderness   
in the medial joint line   
and lateral joint line.  
Range of Motion  
The patient has normal   
right knee ROM.  
Extension: 0  
Flexion: 140  
Muscle Strength  
The patient has normal   
right knee strength.  
Tests  
Davidson: Medial -   
negative Lateral -   
negative  
Lachman: Anterior -   
negative  
Drawer: Anterior -   
negative Posterior -   
negative  
Varus: negative  
Valgus: negative  
Patellar Apprehension:   
negative  
Other  
Erythema: absent  
Sensation: normal  
Swelling: none  
Other tests: no effusion   
present  
Comments: Moderate   
patellofemoral   
crepitance  
Left Knee Exam  
Tenderness  
The patient is   
experiencing tenderness   
in the patella, LCL,   
lateral joint line,   
medial joint line  
and medial hamstring   
(distal lateral   
hamstring, lateral   
tibial plateau, distal   
ITB).  
Range of Motion  
Extension: 0  
Flexion: 120 (limited by   
pain)  
Tests  
Davidson: Medial -   
negative Lateral -   
positive  
Lachman: Anterior -   
negative  
Drawer: Anterior -   
negative Posterior -   
negative  
Varus: positive  
Valgus: negative  
Patellar Apprehension:   
negative  
Other  
Erythema: absent  
Sensation: normal  
Swelling: mild   
(inferolateral soft   
tissue)  
Effusion: no effusion   
present  
Comments: Knee extension   
4/5  
Mild patellofemoral   
crepitance  
Mild pain with lateral   
Davidson testing; but,   
no click/catch  
Mild pain with Varus   
testing without laxity  
Right Hip Exam  
Range of Motion  
Extension: normal  
Flexion: normal  
Internal Rotation:   
normal  
External Rotation:   
normal  
Muscle Strength  
Right hip normal muscle   
strength: Extension 5/5.  
Flexion: 5/5  
Tests  
ZHAO: negative  
Other  
Sensation: normal  
Comments: FADIR negative  
Leg roll negative  
Able to perform supine   
SLR  
Left Hip Exam  
Range of Motion  
Extension: normal  
Flexion: normal  
Internal Rotation:   
normal  
External Rotation:   
normal  
Muscle Strength  
Left hip normal muscle   
strength: Extension   
4+/5.  
Flexion: 4/5  
Tests  
ZHAO: negative  
Other  
Sensation: normal  
Comments: FADIR negative  
Leg roll negative  
Able to perform supine   
SLR  
Radiologic Findings:  
XR-KNEE LEFT 3 VIEWS  
Date Of Service:   
2019 10:01 AM  
Reason for study:   
S89.92XA: Unspecified   
injury of left lower   
leg, initial encounter  
M25.562: Pain in left   
knee  
M25.462: Effusion, left   
knee  
M23.8X9: Other internal   
derangements of   
unspecified knee  
M25.9: Joint disorder,   
unspecified  
R26.2: Difficulty in   
walking, not elsewhere   
classified  
M25.562: Pain in left   
knee  
M25.562: Pain in left   
knee, History: 72 yo   
female c/o left knee   
pain, swelling, and   
difficulty  
walking s/p twisting   
knee injury 3 weeks ago.   
Eval for bony   
abnormalities, acute fx,   
joint  
effusion, degenerative   
changes. TTP medial and   
lateral joint line, LCL,   
and lateral tibial  
plateau.. Number of   
Series/Images:  
3.  
Comparison: None  
Findings:  
3 views left knee   
including standing PA   
comparison views and   
sunrise comparison views   
of the  
bilateral knees   
demonstrate joint spaces   
to be preserved. Minimal   
osteophyte formation   
medial and  
lateral compartments on   
the left and lateral   
compartment on the   
right. Patellofemoral   
osteophyte  
formation mild   
bilaterally without   
patellar tilt or   
subluxation. No joint   
effusion or fracture   
noted  
IMPRESSION:  
1. Mild osteoarthritis   
both knees as above  
Electronically Signed   
by: Alcon Carolina MD,   
2019 10:08 AM  
Assessment and Plan:  
ICD-10-CM  
1.Sprain of lateral   
collateral ligament of   
left knee, initial   
bnzazpyckV98.422A  
2.Patellofemoral   
arthritis of left knee,   
mildM17.12  
3.Left knee injury,   
initial   
xksuxgvldX85.92XA  
XR-KNEE LEFT 3 VIEWS  
4.Acute pain of left   
kneeM25.562  
XR-KNEE LEFT 3 VIEWS  
5.Lateral joint line   
tenderness of left   
kneeM25.562  
XR-KNEE LEFT 3 VIEWS  
6.Medial joint line   
tenderness of left   
kneeM25.562  
XR-KNEE LEFT 3 VIEWS  
7.Swelling of left knee   
cjphpZ26.462  
XR-KNEE LEFT 3 VIEWS  
8.Pain of left lower   
legM79.662  
9.Patellofemoral   
crepitus,   
fgaavujesM51.8X9  
XR-KNEE LEFT 3 VIEWS  
10.Walking difficulty   
due to knee joint   
clcztwanD68.9  
XR-KNEE LEFT 3 VIEWS  
R26.2  
11.Patellofemoral   
arthritis of right knee,   
mildM17.11  
Left knee X-rays  
Suspect Left pain likely   
due to Grade 1 LCL   
sprain  
Diff Dx also includes   
but not limited too:   
knee capsular sprain,   
lateral meniscal tear,  
chondromalacia of knee,   
patellofemoral arthritis   
flare, synovitis  
Rec hinge knee brace  
Knee ROM and   
strengthening HEP -   
provided and reviewed by   
AT  
Ice prn pain  
Aleve 220 mg 1-2 tabs po   
bid with food prn pain  
OTC Tylenol  mg   
1-2 tabs po tid prn pain  
Relative rest  
Encourage daily low   
impact physical activity  
RTO in 3 weeks for f/u   
or f/u sooner if needed Normal                                  [facility]  
   
                                        Protein mass conc   Encounter Department  
:   
[facility] SPORTS   
MEDICINE  
Progress Notes by Irlanda Fuchs MD at   
2019 9:00 AM  
Author: HERLINDA Zelayaervice: (none)Author   
Type: Physician  
Filed: 2019 10:46   
AMEncounter Date:   
2019Status: Signed  
: Irlanda Fuchs MD (Physician)  
Reviewed Left knee   
X-rays and Radiologist's   
impression with pt. And   
pt's family during   
office  
visit. Provided pt. With   
copy of results for   
personal record.    Normal                                  [facility]  
   
                                                    XR-KNEE LEFT 3 VIEWSon    
   
                                        XR-KNEE LEFT 3 VIEWS XR-KNEE LEFT 3 VIEW  
S  
Date Of Service:   
2019 10:01 AM  
Reason for study:   
S89.92XA: Unspecified   
injury of left lower   
leg, initial encounter  
M25.562: Pain in left   
knee  
M25.462: Effusion, left   
knee  
M23.8X9: Other internal   
derangements of   
unspecified knee  
M25.9: Joint disorder,   
unspecified  
R26.2: Difficulty in   
walking, not elsewhere   
classified  
M25.562: Pain in left   
knee  
M25.562: Pain in left   
knee, History: 72 yo   
female c/o left knee   
pain, swelling, and   
difficulty walking s/p   
twisting knee injury 3   
weeks ago. Eval for bony   
abnormalities, acute fx,   
joint effusion,   
degenerative changes.   
TTP medial and lateral   
joint line,  
LCL, and lateral tibial   
plateau.. Number of   
Series/Images: 3.  
Comparison: None  
Findings:  
3 views left knee   
including standing PA   
comparison views and   
sunrise comparison views   
of the bilateral knees   
demonstrate joint spaces   
to be preserved. Minimal   
osteophyte formation   
medial and lateral   
compartments on the left   
and lateral compartment   
on the  
right. Patellofemoral   
osteophyte formation   
mild bilaterally without   
patellar tilt or   
subluxation. No joint   
effusion or fracture   
noted  
IMPRESSION:  
IMPRESSION:  
1. Mild osteoarthritis   
both knees as above  
Electronically Signed   
by: Alcon Carolina MD,   
2019 10:08 AM  [facility]  
   
                                                    ECG 12 [place] 2018   
   
                                Atrial Rate                     Invalid   
Interpretation   
Code                                                [facility]  
   
                                P Axis                          Invalid   
Interpretation   
Code                                                [facility]  
   
                                P-R Interval                    Invalid   
Interpretation   
Code                                                [facility]  
   
                                Q-T Interval                    Invalid   
Interpretation   
Code                                                [facility]  
   
                                                    Q-T Interval   
(corrected)                                         Invalid   
Interpretation   
Code                                                [facility]  
   
                                QRS Duration                    Invalid   
Interpretation   
Code                                                [facility]  
   
                                                    QTC Calculation   
(Bezet)                                             Invalid   
Interpretation   
Code                                                [facility]  
   
                                R Axis                          Invalid   
Interpretation   
Code                                                [facility]  
   
                                T Axis                          Invalid   
Interpretation   
Code                                                [facility]  
   
                                Ventricular Rate                 Invalid   
Interpretation   
Code                                                [facility]  
   
                                                    POC Hematocriton 2018   
   
                                Hematocrit (HCT) 37.0 %          Invalid   
Interpretation   
Code                      36 - 46 %                 [facility]  
   
                                                    POC Hemoglobinon 2018   
   
                                Hemoglobin (HGB) 12.6 g/dL       Invalid   
Interpretation   
Code                                    12 - 16   
g/dL                                    [facility]  
   
                                                    Interpretation and   
review of laboratory   
results                   Normal                    Invalid   
Interpretation   
Code                                                [facility]  
   
                                                    NUCLEAR MEDICINE STRESS TEST  
on 2017   
   
                                                    NUCLEAR MEDICINE   
STRESS TEST                             [facility]   
Nuclear Cardiology [facility] [address], [id number]   
[phone] Fax:   
[phone] MPI-One Day   
Rest/Stress with   
LexiscanName: ELMIRA WARNERStudy Date:   
2017MRN:   
[id number] #:   
[id number] Date:   
1945Ordering   
Provider: DAIJA Orantes: 72   
year(s)Patient Status:   
lOutpatient l Gender:   
FemaleReading Physician:   
Carter Gale MD Height: 64   
in. (162.6 cm)Clinical   
History: HTN, HLD, SOB,   
MI, PCI, Fam Hx, Asthma   
Weight: 182 lbs.   
(82.55kg)Examination:   
CARDIAC STRESS IMAGING W   
WALL MOTION & EJECT   
FRACTIndication: CAD,   
Hypertension,   
Hyperlipidemia Stress   
data & ECG findingsType   
of Stress: Lexiscan:   
0.4mg IV over 10-15   
seconds duration. Rest   
BP: 127/92mmHg Max BP:   
146/91 mmHg Rest HR: 51   
bpm Max HR: 78 bpm   
Symptoms: Short   
ofbreath, Nausea,   
Headache, Throat   
fullness Reason for   
Termination:   
Pharmacologicprotocol   
Rest ECG: Normal sinus   
rhythm Stress ECG:   
Normal ST segment   
response topharmacologic   
stress Recovery ECG: No   
ischemic changes HR   
Resp. Appropriate   
BPResp. Normal resting   
BP - appropriate   
response Stress   
Impression: Normal   
stresstest by EKG   
criteria Myocardial   
Perfusion Imaging   
Protocol Details   
ProcedureDescription   
Stage Agent IV Dose   
(mCi) Injection Date   
Injection Time Image   
TimeRest-stress Rest   
Tetrofosmin 9.80   
2017 08:46 AM   
09:04 AM Gated   
SPECTStress Tetrofosmin   
31.90 2017 09:46   
AM 10:29 AM LV Size   
NormalVentricular   
Function EF: 59%EDV: 71   
mlESV: 29 mlTID: 0.96   
Stress Imaging: Small   
mild apical inferior   
defect which appears to   
beimproved with stress   
imagingRest Imaging:   
Small mild apical and   
inferior defectWall   
Motion LV function is   
normal at 59   
percentFinal   
Conclusions/SummaryStres  
s ECG Impressions:   
Normal stress test by   
EKG criteriaSummary:   
Probably normal stress   
test with small mild   
apical and inferior   
defectwithout any   
definite   
ischemiaElectronically   
signed by MD Carter Gale   
on 2017 at 02:23   
PM (No SignatureObject) [facility]  
  
  
  
Vital Signs  
  
  
                      Date Time  Vital Sign Value      [employer] Rosy kaplan  
   
                                                    09-   
14:                              Body mass index (BMI)   
[Ratio]                   30.4 kg/m2                Lizette Browne PA-C  
Work Phone:   
[phone]                          [facility]  
   
                                                    09-   
14:          Body weight         77.84 kg            Lieztte Browne PA-C  
Work Phone:   
[phone]                          [facility]  
   
                                                    09-   
14:                              Diastolic blood   
pressure                  76 mm[Hg]                 Lizette FREEDMAN-WANDA  
Work Phone:   
[phone]                          [facility]  
   
                                                    09-   
14:          Heart rate          82 /min             Lizette Queener PA-C  
Work Phone:   
[phone]                          [facility]  
   
                                                    09-   
14:          Respiratory rate    18 /min             Lizette Browne PA-C  
Work Phone:   
[phone]                          [facility]  
   
                                                    09-   
14:                              SaO2% (BldA) [Mass   
fraction]                 98 %                      Lizette Browne PA-C  
Work Phone:   
[phone]                          [facility]  
   
                                                    09-   
14:                              Systolic blood   
pressure                  158 mm[Hg]                Lizette Browne PA-C  
Work Phone:   
[phone]                          [facility]  
   
                                                    2024   
10:                              Body mass index (BMI)   
[Ratio]                   30.36 kg/m2               Maria E Betsey   
APRN.CNP  
Work Phone:   
[phone]                          [facility]  
   
                                                    2024   
10:          Body weight         77.75 kg            Maria E Betsey   
APRN.CNP  
Work Phone:   
[phone]                          [facility]  
   
                                                    2024   
10:                              Diastolic blood   
pressure                  84 mm[Hg]                 Maria E Betsey   
APRN.CNP  
Work Phone:   
[phone]                          [facility]  
   
                                                    2024   
10:          Heart rate          73 /min             Maria E Betsey   
APRN.CNP  
Work Phone:   
[phone]                          [facility]  
   
                                                    2024   
10:          Respiratory rate    20 /min             Maria E Betsey   
APRN.CNP  
Work Phone:   
[phone]                          [facility]  
   
                                                    2024   
10:                              SaO2% (BldA) [Mass   
fraction]                 96 %                      Maria E Betsey   
APRN.CNP  
Work Phone:   
[phone]                          [facility]  
   
                                                    2024   
10:                              Systolic blood   
pressure                  162 mm[Hg]                Maria E Betsey   
APRN.CNP  
Work Phone:   
[phone]                          [facility]  
   
                                                    2024   
10:                              Body mass index (BMI)   
[Ratio]                   30.15 kg/m2               Yanet Shen   
APRN.CNP  
Work Phone:   
[phone]                          [facility]  
   
                                                    2024   
10:          Body weight         77.2 kg             Yanet Shen   
APRN.CNP  
Work Phone:   
[phone]                          [facility]  
   
                                                    2024   
10:                              Diastolic blood   
pressure                  80 mm[Hg]                 Yanet Shen   
APRN.CNP  
Work Phone:   
[phone]                          [facility]  
   
                                                    2024   
10:          Heart rate          72 /min             Yanet Shen   
APRN.CNP  
Work Phone:   
[phone]                          [facility]  
   
                                                    2024   
10:          Respiratory rate    14 /min             Yanet Shen   
APRN.CNP  
Work Phone:   
[phone]                          [facility]  
   
                                                    2024   
10:                              SaO2% (BldA) [Mass   
fraction]                 95 %                      Yanet Shen   
APRN.CNP  
Work Phone:   
[phone]                          [facility]  
   
                                                    2024   
10:                              Systolic blood   
pressure                  158 mm[Hg]                Yanet Shen   
APRN.CNP  
Work Phone:   
[phone]                          [facility]  
   
                                                    2024   
10:                              Body mass index (BMI)   
[Ratio]                   29.83 kg/m2               Eugene Sepulveda Jr., MD  
Work Phone:   
[phone]                          [facility]  
   
                                                    2024   
10:          Body weight         76.39 kg            Eugene Sepulveda Jr., MD  
Work Phone:   
[phone]                          [facility]  
   
                                                    2024   
10:                              Diastolic blood   
pressure                  82 mm[Hg]                 Eugene Sepulveda Jr., MD  
Work Phone:   
[phone]                          [facility]  
   
                                                    2024   
10:          Heart rate          75 /min             Eugene Sepulveda Jr., MD  
Work Phone:   
[phone]                          [facility]  
   
                                                    2024   
10:          Respiratory rate    16 /min             Eugene Sepulveda Jr., MD  
Work Phone:   
[phone]                          [facility]  
   
                                                    2024   
10:                              SaO2% (BldA) [Mass   
fraction]                 94 %                      Eugene Sepulveda Jr., MD  
Work Phone:   
[phone]                          [facility]  
   
                                                    2024   
10:                              Systolic blood   
pressure                  138 mm[Hg]                Eugene Sepulveda Jr., MD  
Work Phone:   
[phone]                          [facility]  
   
                                                    2024   
12:                              Diastolic blood   
pressure                  70 mm[Hg]                 Bebeto Rodriguez DO  
Work Phone:   
[phone]                          [facility]  
   
                                                    2024   
12:                              Systolic blood   
pressure                  150 mm[Hg]                Bebeto Rodriguez DO  
Work Phone:   
[phone]                          [facility]  
   
                                                    2024   
11:                              Body mass index (BMI)   
[Ratio]                   30.29 kg/m2               Bebeto Rodriguez DO  
Work Phone:   
[phone]                          [facility]  
   
                                                    2024   
11:          Body temperature    98.1 [degF]         Bebeto Rodriguez DO  
Work Phone:   
[phone]                          [facility]  
   
                                                    2024   
11:          Body weight         77.56 kg            Bebeto Rodriguez DO  
Work Phone:   
[phone]                          [facility]  
   
                                                    2024   
11:          Heart rate          80 /min             Bebeto Rodriguez DO  
Work Phone:   
[phone]                          [facility]  
   
                                                    2024   
11:          Respiratory rate    16 /min             Bebeto Rodriguez DO  
Work Phone:   
[phone]                          [facility]  
   
                                                    2023   
10:          Body height         160 cm              Yanet Shen   
APRN.CNP  
Work Phone:   
[phone]                          [facility]  
   
                                                    2023   
10:          Body weight         77.02 kg            Yanet Shen   
APRN.CNP  
Work Phone:   
[phone]                          [facility]  
   
                                                    2023   
10:                              Diastolic blood   
pressure                  70 mm[Hg]                 Yanet Shen   
APRN.CNP  
Work Phone:   
[phone]                          [facility]  
   
                                                    2023   
10:          Heart rate          84 /min             Yanet Shen   
APRN.CNP  
Work Phone:   
[phone]                          [facility]  
   
                                                    2023   
10:          Respiratory rate    16 /min             Yanet Shen   
APRN.CNP  
Work Phone:   
[phone]                          [facility]  
   
                                                    2023   
10:                              SaO2% (BldA) [Mass   
fraction]                 97 %                      Yanet Shen   
APRN.CNP  
Work Phone:   
[phone]                          [facility]  
   
                                                    2023   
10:                              Systolic blood   
pressure                  122 mm[Hg]                Yanet Shen   
APRN.CNP  
Work Phone:   
[phone]                          [facility]  
   
                                                    2023   
14:          Body height         162.6 cm            Laura Tinajero MD  
Work Phone:   
[phone]                          [facility]  
   
                                                    2023   
14:                              Body mass index (BMI)   
[Ratio]                   29.18 kg/m2               Laura Tinajero MD  
Work Phone:   
[phone]                          [facility]  
   
                                                    2023   
14:          Body weight         77.11 kg            Laura Tinajero MD  
Work Phone:   
[phone]                          [facility]  
   
                                                    2023   
15:                              Body mass index (BMI)   
[Ratio]                   30.06 kg/m2               John Kumari MD  
Work Phone:   
[phone]                          [facility]  
   
                                                    2023   
15:          Body temperature    98.1 [degF]         John Kumari MD  
Work Phone:   
[phone]                          [facility]  
   
                                                    2023   
15:          Body weight         79.42 kg            John Kumari MD  
Work Phone:   
[phone]                          [facility]  
   
                                                    2023   
15:                              Diastolic blood   
pressure                  103 mm[Hg]                John Kumari MD  
Work Phone:   
[phone]                          [facility]  
   
                                                    2023   
15:          Heart rate          116 /min            John Kumari MD  
Work Phone:   
[phone]                          [facility]  
   
                                                    2023   
15:          Respiratory rate    18 /min             John Kumari MD  
Work Phone:   
[phone]                          [facility]  
   
                                                    2023   
15:                              SaO2% (BldA) [Mass   
fraction]                 97 %                      John Kumari MD  
Work Phone:   
[phone]                          [facility]  
   
                                                    2023   
15:                              Systolic blood   
pressure                  165 mm[Hg]                John Kumari MD  
Work Phone:   
[phone]                          [facility]  
   
                                                    2022   
15:          Body weight         77.11 kg            Kinjal Carmen MD  
Work Phone:   
[phone]                          [facility]  
   
                                                    2022   
15:                              Diastolic blood   
pressure                  90 mm[Hg]                 Kinjal Carmen MD  
Work Phone:   
[phone]                          [facility]  
   
                                                    2022   
15:          Heart rate          130 /min            Kinjal Carmen MD  
Work Phone:   
[phone]                          [facility]  
   
                                                    2022   
15:                              Systolic blood   
pressure                  150 mm[Hg]                Kinjal Carmen MD  
Work Phone:   
[phone]                          [facility]  
   
                                                    2022   
11:          Body weight         76.75 kg            Maria E Betsey   
APRN.CNP  
Work Phone:   
[phone]                          [facility]  
   
                                                    2022   
11:                              Diastolic blood   
pressure                  86 mm[Hg]                 Maria E Betsey   
APRN.CNP  
Work Phone:   
[phone]                          [facility]  
   
                                                    2022   
11:          Heart rate          81 /min             Maria E Betsey   
APRN.CNP  
Work Phone:   
[phone]                          [facility]  
   
                                                    2022   
11:          Respiratory rate    18 /min             Maria E Betsey   
APRN.CNP  
Work Phone:   
[phone]                          [facility]  
   
                                                    2022   
11:                              SaO2% (BldA) [Mass   
fraction]                 98 %                      Maria E Betsey   
APRN.CNP  
Work Phone:   
[phone]                          [facility]  
   
                                                    2022   
11:                              Systolic blood   
pressure                  130 mm[Hg]                Maria E Betsey   
APRN.CNP  
Work Phone:   
[phone]                          [facility]  
   
                                                    2022   
14:                              Diastolic blood   
pressure                  100 mm[Hg]                Georgina Cleveland PTA  
Work Phone:   
[phone]                          [facility]  
   
                                                    2022   
14:          Heart rate          86 /min             Georgina Cleveland PTA  
Work Phone:   
[phone]                          [facility]  
   
                                                    2022   
14:                              SaO2% (BldA) [Mass   
fraction]                 93 %                      Georgina Cleveland PTA  
Work Phone:   
[phone]                          [facility]  
   
                                                    2022   
14:                              Systolic blood   
pressure                  160 mm[Hg]                Georgina Cleveland PTA  
Work Phone:   
[phone]                          [facility]  
   
                                                    2022   
11:          Body height         162.6 cm            John Kumari MD  
Work Phone:   
[phone]                          [facility]  
   
                                                    2022   
11:                              Body mass index (BMI)   
[Ratio]                   29.18 kg/m2               John Kumari MD  
Work Phone:   
[phone]                          [facility]  
   
                                                    2022   
11:          Body weight         77.11 kg            John Kumari MD  
Work Phone:   
[phone]                          [facility]  
   
                                                    2022   
11:                              Diastolic blood   
pressure                  85 mm[Hg]                 John Kumari MD  
Work Phone:   
[phone]                          [facility]  
   
                                                    2022   
11:          Heart rate          77 /min             John Kumari MD  
Work Phone:   
[phone]                          [facility]  
   
                                                    2022   
11:                              SaO2% (BldA) [Mass   
fraction]                 100 %                     John Kumari MD  
Work Phone:   
[phone]                          [facility]  
   
                                                    2022   
11:                              Systolic blood   
pressure                  181 mm[Hg]                John Kumari MD  
Work Phone:   
[phone]                          [facility]  
   
                                                    2022   
15:          Body weight         77.11 kg            Jolene NAVACNP  
Work Phone:   
[phone]                          [facility]  
   
                                                    2022   
15:                              Diastolic blood   
pressure                  80 mm[Hg]                 Jolene Shireen   
APRN.CNP  
Work Phone:   
[phone]                          [facility]  
   
                                                    2022   
15:          Heart rate          84 /min             Jolenezara Liang   
APRN.CNP  
Work Phone:   
[phone]                          [facility]  
   
                                                    2022   
15:                              Systolic blood   
pressure                  150 mm[Hg]                Jolene Shireen   
APRN.CNP  
Work Phone:   
[phone]                          [facility]  
   
                                                    2022   
11:                              Diastolic blood   
pressure                  80 mm[Hg]                 Joon Golias PT  
Work Phone:   
[phone]                          [facility]  
   
                                                    2022   
11:          Heart rate          122 /min            Joon Golias PT  
Work Phone:   
[phone]                          [facility]  
   
                                                    2022   
11:                              SaO2% (BldA) [Mass   
fraction]                 96 %                      Joon Golias PT  
Work Phone:   
[phone]                          [facility]  
   
                                                    2022   
11:                              Systolic blood   
pressure                  165 mm[Hg]                Joon Golias PT  
Work Phone:   
[phone]                          [facility]  
   
                                                    2022   
12:          Body weight         75.48 kg            Maria E Betsey   
APRN.CNP  
Work Phone:   
[phone]                          [facility]  
   
                                                    2022   
12:                              Diastolic blood   
pressure                  104 mm[Hg]                Maria E Betsey   
APRN.CNP  
Work Phone:   
[phone]                          [facility]  
   
                                                    2022   
12:          Heart rate          121 /min            Maria E Betsey   
APRN.CNP  
Work Phone:   
[phone]                          [facility]  
   
                                                    2022   
12:                              SaO2% (BldA) [Mass   
fraction]                 98 %                      Maria E Betsey   
APRN.CNP  
Work Phone:   
[phone]                          [facility]  
   
                                                    2022   
12:                              Systolic blood   
pressure                  162 mm[Hg]                Maria E Betsey   
APRN.CNP  
Work Phone:   
[phone]                          [facility]  
   
                                                    2022   
08:          Body weight         73.03 kg            Irlanda Chan MD  
Work Phone:   
[phone]                          [facility]  
   
                                                    2022   
08:                              Diastolic blood   
pressure                  62 mm[Hg]                 Irlanda Chan MD  
Work Phone:   
[phone]                          [facility]  
   
                                                    2022   
08:          Heart rate          112 /min            Irlanda Chan MD  
Work Phone:   
[phone]                          [facility]  
   
                                                    2022   
08:                              SaO2% (BldA) [Mass   
fraction]                 97 %                      Irlanda Chan MD  
Work Phone:   
[phone]                          [facility]  
   
                                                    2022   
08:                              Systolic blood   
pressure                  134 mm[Hg]                Irlanda Chan MD  
Work Phone:   
[phone]                          [facility]  
   
                                                    2022   
12:          Heart rate          82 /min             Laura Tinajero MD  
Work Phone:   
[phone]                          [facility]  
   
                                                    2022   
12:                              SaO2% (BldA) [Mass   
fraction]                 94 %                      Laura Tinajero MD  
Work Phone:   
[phone]                          [facility]  
   
                                                    2022   
08:          Body temperature    97 [degF]           Larua Tinajero MD  
Work Phone:   
[phone]                          [facility]  
   
                                                    2022   
08:                              Diastolic blood   
pressure                  78 mm[Hg]                 Laura Tinajero MD  
Work Phone:   
[phone]                          [facility]  
   
                                                    2022   
08:          Respiratory rate    20 /min             Laura Tinajero MD  
Work Phone:   
[phone]                          [facility]  
   
                                                    2022   
08:                              Systolic blood   
pressure                  144 mm[Hg]                Laura Tinajero MD  
Work Phone:   
[phone]                          [facility]  
   
                                                    2022   
12:          Body temperature    97 [degF]           Laura Tinajero MD  
Work Phone:   
[phone]                          [facility]  
   
                                                    2022   
12:                              Diastolic blood   
pressure                  71 mm[Hg]                 Laura Tinajero MD  
Work Phone:   
[phone]                          [facility]  
   
                                                    2022   
12:          Heart rate          80 /min             Laura Tinajero MD  
Work Phone:   
[phone]                          [facility]  
   
                                                    2022   
12:          Respiratory rate    14 /min             Laura Tinajero MD  
Work Phone:   
[phone]                          [facility]  
   
                                                    2022   
12:                              SaO2% (BldA) [Mass   
fraction]                 100 %                     Laura Tinajero MD  
Work Phone:   
[phone]                          [facility]  
   
                                                    2022   
12:                              Systolic blood   
pressure                  136 mm[Hg]                Laura Tinajero MD  
Work Phone:   
[phone]                          [facility]  
   
                                                    2022   
05:          Body height         162.6 cm            Laura Tinajero MD  
Work Phone:   
[phone]                          [facility]  
   
                                                    2022   
05:                              Body mass index (BMI)   
[Ratio]                   28.15 kg/m2               Laura Tinajero MD  
Work Phone:   
[phone]                          [facility]  
   
                                                    2022   
05:          Body weight         74.39 kg            Laura Tinajero MD  
Work Phone:   
[phone]                          [facility]  
   
                                                    2022   
11:          Body height         162.6 cm            Laura Tinajero MD  
Work Phone:   
[phone]                          [facility]  
   
                                                    2022   
11:                              Body mass index (BMI)   
[Ratio]                   28.15 kg/m2               Laura Tinajero MD  
Work Phone:   
[phone]                          [facility]  
   
                                                    2022   
11:          Body temperature    97.9 [degF]         Laura Tinajero MD  
Work Phone:   
[phone]                          [facility]  
   
                                                    2022   
11:          Body weight         74.39 kg            Laura Tinajero MD  
Work Phone:   
[phone]                          [facility]  
   
                                                    2022   
11:                              Diastolic blood   
pressure                  66 mm[Hg]                 Laura Tinajero MD  
Work Phone:   
[phone]                          [facility]  
   
                                                    2022   
11:          Heart rate          104 /min            Laura Tinajero MD  
Work Phone:   
[phone]                          [facility]  
   
                                                    2022   
11:          Respiratory rate    16 /min             Laura Tinajero MD  
Work Phone:   
[phone]                          [facility]  
   
                                                    2022   
11:                              SaO2% (BldA) [Mass   
fraction]                 97 %                      Laura Tinajero MD  
Work Phone:   
[phone]                          [facility]  
   
                                                    2022   
11:                              Systolic blood   
pressure                  135 mm[Hg]                Laura Tinajero MD  
Work Phone:   
[phone]                          [facility]  
   
                                                    04-   
13:          Body weight         74.75 kg            Yanet Zurawick   
APRN.CNP  
Work Phone:   
[phone]                          [facility]  
   
                                                    04-   
13:                              Diastolic blood   
pressure                  62 mm[Hg]                 Yanet Zurawick   
APRN.CNP  
Work Phone:   
[phone]                          [facility]  
   
                                                    04-   
13:          Heart rate          68 /min             Yanet Zurawick   
APRN.CNP  
Work Phone:   
[phone]                          [facility]  
   
                                                    04-   
13:          Respiratory rate    16 /min             Yanet Zurawick   
APRN.CNP  
Work Phone:   
[phone]                          [facility]  
   
                                                    04-   
13:                              Systolic blood   
pressure                  118 mm[Hg]                Yanet Zurawick   
APRN.CNP  
Work Phone:   
[phone]                          [facility]  
   
                                                    2018   
12:      BMI (Body Mass Index) 31.41 kg/m2     [facility]  
   
                                                    [facility]   
12:      Body Temperature 97.9 [degF]     [facility]  
   
                                                    2018   
12:      BP Diastolic    70 mm[Hg]       Monique Hawley  [facility]  
   
                                                    2018   
12:      BP Systolic     139 mm[Hg]      Monique Hawley  [facility]  
   
                                                    2018   
12:      Height          162.6 cm        [facility]  
   
                                                    2018   
12:      Pulse (Heart Rate) 55 /min         [facility]  
   
                                                    2018   
12:      Pulse Oximetry  97 %            Monique Hawley  [facility]  
   
                                                    2018   
12:      Respiratory Rate 14 /min         Monique Hawley  [facility]  
   
                                                    2018   
12:      Weight          83.01 kg        Monique Hawley  [facility]  
  
  
  
Encounters  
  
  
                          Encounter Date Encounter Type Care Provider Facility  
   
                                                    Start: 09-  
End: 09-           Patient encounter procedure Lizette Browne PA-C  
Work Phone:   
[phone]                          Neurology  
   
                                        Comment on above:   Swallowing disorder   
(Primary Dx);  
Hoarseness;  
Dysphagia, unspecified type;  
S/P cervical spinal fusion;  
Episode of change in speech [R47.89];  
Wheezing [R06.2];  
Intractable migraine with aura without status migrainosus;  
Lacunar stroke (HCC)   
   
                                                    Start: 09-  
End: 09-     ambulatory          BEBETO L RODRIGUEZ   Facility:[facility]  
   
                                                    Start: 2024  
End: 2024     ambulatory          BEBETO L RODRIGUEZ   Facility:[facility]  
   
                                                    Start: 2024  
End: 2024                         Office outpatient visit 25   
minutes                                 Maria E OWUSU.CNP  
Work Phone:   
[phone]                          [facility]  
   
                                        Comment on above:   Essential hypertensi  
on (Primary Dx);  
Mild intermittent asthma without complication;  
Seasonal allergies   
   
                                Start: 2024 Telephone encounter Eugene Sepulveda MD  
Work Phone:   
[phone]                          Neurology  
   
                                        Comment on above:   Results   
   
                                                    Start: 2024  
End: 2024     ambulatory          BEBETO L RODRIGUEZ   Facility:[facility]  
   
                                                    Start: 2024  
End: 2024                         Subsequent hospital visit   
by physician                            [employer] [facility]   
(I-Stat/1.5t)  
Work Phone:   
[phone]                          Radiology  
   
                                        Comment on above:   Hoarseness [R49.0]   
   
                                Start: 2024 Telephone encounter Eugene Sepulveda MD  
Work Phone:   
[phone]                          Neurology  
   
                                        Comment on above:   Medication Request   
   
                                                    Start: 2024  
End: 2024           Patient encounter procedure Yanet OWUSU.CNP  
Work Phone:   
[phone]                          Family Medicine   
[place]  
   
                                        Comment on above:   Essential hypertensi  
on (Primary Dx)   
   
                                                    Start: 2024  
End: 2024     ambulatory          BEBETO RODRIGUEZ   Facility:[facility]  
   
                                Start: 2024 Telephone encounter Bebeto baker   
DO  
Work Phone:   
[phone]                          [facility]   
[place]  
   
                                        Comment on above:   Patient Update (Bloo  
d Pressure)   
   
                                Start: 07- Refill          Bebeto lord   
DO  
Work Phone:   
[phone]                          [facility]   
[place]  
   
                                        Comment on above:   Refill Request   
   
                                Start: 2024 Telephone encounter Eugene Sepulveda MD  
Work Phone:   
[phone]                          Neurology  
   
                                        Comment on above:   medication informati  
on   
   
                                                    Start: 2024  
End: 2024     ambulatory          EUGENE SEPULVEDA JR  Facility:[facility]  
   
                                                    Start: 2024  
End: 2024           Patient encounter procedure Eugene Sepulveda MD  
Work Phone:   
[phone]                          Neurology  
   
                                        Comment on above:   Hoarseness (Primary   
Dx);  
Dysphagia, unspecified type;  
Swallowing disorder;  
S/P cervical spinal fusion;  
Wheezing [R06.2];  
Episode of change in speech [R47.89]   
   
                                Start: 2024 Refill          Bebeto lord   
DO  
Work Phone:   
[phone]                          [facility]   
[place]  
   
                                        Comment on above:   Refill Request   
   
                                Start: 2024 Refill          Bebeto lord   
DO  
Work Phone:   
[phone]                          [facility]   
[place]  
   
                                        Comment on above:   Refill Request   
   
                                Start: 2024 Telephone encounter Bebeto baker   
DO  
Work Phone:   
[phone]                          [facility]   
[place]  
   
                                        Comment on above:   Results   
   
                                                    Start: 2024  
End: 2024     ambulatory          BEBETO RODRIGUEZ   Facility:[facility]  
   
                                Start: 2024 Telephone encounter Sammy Coates PA-C  
Work Phone:   
[phone]                          [facility]   
[place]  
   
                                        Comment on above:   Results   
   
                                Start: 2024 Telephone encounter Bebeto baker   
DO  
Work Phone:   
[phone]                          [facility]   
[place]  
   
                                        Comment on above:   Results   
   
                                                    Start: 2024  
End: 2024                         Subsequent hospital visit   
by physician                            [facility] Katie Olmos  
Work Phone:   
[phone]                          Radiology  
   
                                        Comment on above:   Chronic cough [R05.3  
]   
   
                                                    Start: 2024  
End: 2024     ambulatory          BEBETO RODRIGUEZ   Facility:[facility]  
   
                                                    Start: 2024  
End: 2024           Patient encounter procedure Bebeto Garciaon   
DO  
Work Phone:   
[phone]                          [facility]   
[place]  
   
                                        Comment on above:   Chronic cough (Prima  
ry Dx);  
Hoarseness;  
Dysphagia, unspecified type;  
Swallowing disorder;  
SOB (shortness of breath);  
Coronary artery disease due to lipid rich plaque;  
Hyperlipidemia, mixed;  
Essential hypertension;  
Hypercalcemia   
   
                                                    Start: 2024  
End: 2024     ambulatory          BEBETO RODRIGUEZ   Facility:[facility]  
   
                                Start: 2023 Telephone encounter Bebeto baker   
DO  
Work Phone:   
[phone]                          [facility]   
[place]  
   
                                        Comment on above:   Refill Request   
   
                                                    Start: 2023  
End: 2023     ambulatory          BEBETO RODRIGUEZ   Facility:[facility]  
   
                                                    Start: 2023  
End: 2023           Patient encounter procedure Yanet Harrisrisa NAVACNP  
Work Phone:   
[phone]                          [facility]   
[place]  
   
                                        Comment on above:   Medicare annual well  
ness visit, subsequent (Primary Dx);  
Mild intermittent asthma without complication;  
Essential hypertension;  
Hyperlipidemia, mixed;  
IFG (impaired fasting glucose);  
Mild intermittent asthma, uncomplicated;  
Headaches;  
Vitamin D deficiency;  
Hypomagnesemia   
   
                                Start: 10- Refill          Bebeto lord   
DO  
Work Phone:   
[phone]                          [facility]   
[place]  
   
                                        Comment on above:   Refill Request   
   
                                                    Start: 2023  
End: 2023     ambulatory          LAURA TINAJERO      [facility]  
   
                                                    Start: 2023  
End: 2023                         Office outpatient visit 15   
minutes                                 Laura Tinajero MD  
Work Phone:   
[phone]                          [facility]   
[facility]   
[facility]  
   
                                        Comment on above:   Cervical spondylosis  
 (Primary Dx);  
Neck pain;  
Status post cervical spinal fusion   
   
                                                    Start: 2023  
End: 2023     ambulatory          JOHN KUMARI [facility]   
Ambulatory  
   
                                                    Start: 2023  
End: 2023                         Office outpatient visit 15   
minutes                                 John Kumari MD  
Work Phone:   
[phone]                          [facility]  
   
                                        Comment on above:   Hoarse (Primary Dx);  
Pharyngoesophageal dysphagia;  
Asthma, unspecified asthma severity, unspecified whether   
complicated, unspecified whether persistent   
   
                                                    Start: 2022  
End: 2022           Patient encounter procedure Kinjal Carmen MD  
Work Phone:   
[phone]                          Cardiology  
   
                                        Comment on above:   Hyperlipidemia, mixe  
d (Primary Dx);  
Coronary artery disease involving native coronary artery of native   
heart without angina pectoris;  
Essential hypertension;  
Palpitations;  
Obesity, Class I, BMI 30-34.9   
   
                                                    Start: 2022  
End: 2022           Patient encounter procedure Maria E Guzmán APRN.CNP  
Work Phone:   
[phone]                          [facility]  
   
                                        Comment on above:   Essential hypertensi  
on (Primary Dx)   
   
                                                    Start: 2022  
End: 2022           Patient encounter procedure Herrera Mendoza MD  
Work Phone:   
[phone]                          Orthopaedics  
   
                                        Comment on above:   Injury of right shou  
lder, subsequent encounter;  
Fall, subsequent encounter;  
Neck pain, chronic   
   
                                                    Start: 2022  
End: 2022           ambulatory                Georgina Herrerarita PTA  
Work Phone:   
[phone]                          [facility]   
Therapy  
   
                                        Comment on above:   Right shoulder injur  
y, subsequent encounter (Primary Dx)   
   
                                                    Start: 2022  
End: 2022           ambulatory                Georgina Sealsba PTA  
Work Phone:   
[phone]                          [facility]   
Therapy  
   
                                        Comment on above:   Right shoulder injur  
y, subsequent encounter (Primary Dx)   
   
                                                    Start: 2022  
End: 2022     ambulatory          BEBETO RODRIGUEZ   [facility]   
Ambulatory  
   
                                                    Start: 2022  
End: 2022                         Office outpatient visit 25   
minutes                                 John Kumari MD  
Work Phone:   
[phone]                          [facility]  
   
                                        Comment on above:   Bilateral temporoman  
dibular joint pain (Primary Dx);  
Chronic eczematous otitis externa of both ears;  
Hoarse;  
Gastroesophageal reflux disease, unspecified whether esophagitis   
present   
   
                                Start: 2022 Telephone encounter Jolene EVA   
Shireen OWUSU.CNP  
Work Phone:   
[phone]                          Cardiology  
   
                                        Comment on above:   Results   
   
                                                    Start: 2022  
End: 2022           Patient encounter procedure Jolene EVA Shireen OWUSU.CNP  
Work Phone:   
[phone]                          Cardiology  
   
                                        Comment on above:   Essential hypertensi  
on (Primary Dx);  
Mixed hyperlipidemia;  
Coronary artery disease involving native coronary artery of native   
heart without angina pectoris;  
Palpitations   
   
                                Start: 2022 ambulatory      Bebeto Aldridge  
son   
DO  
Work Phone:   
[phone]                          [facility]   
[place]  
   
                                        Comment on above:   Blood Pressure   
   
                                                    Start: 2022  
End: 2022           ambulatory                Joon Golias PT  
Work Phone:   
[phone]                          [facility]   
Therapy  
   
                                        Comment on above:   Right shoulder injur  
y, subsequent encounter (Primary Dx)   
   
                                Start: 2022 Telephone encounter Maria E Walker.CNP  
Work Phone:   
[phone]                          [facility]   
[place]  
   
                                        Comment on above:   Appointment   
   
                                                    Start: 2022  
End: 2022           ambulatory                Joon Golias PT  
Work Phone:   
[phone]                          [facility]   
Therapy  
   
                                        Comment on above:   Injury of right shou  
lder, subsequent encounter;  
Fall, subsequent encounter;  
Neck pain, chronic;  
Right shoulder injury, subsequent encounter   
   
                                Start: 11- Telephone encounter Bebeto baker   
DO  
Work Phone:   
[phone]                          [facility]   
[place]  
   
                                        Comment on above:   Patient Question   
   
                                                    Start: 2022  
End: 2022           Patient encounter procedure Maria E Betsey OWUSU.CNP  
Work Phone:   
[phone]                          [facility]   
[place]  
   
                                        Comment on above:   Injury of right shou  
lder, subsequent encounter (Primary Dx);  
Fall, subsequent encounter;  
Neck pain, chronic;  
Bilateral leg pain   
   
                                Start: 11- Telephone encounter Bebeto KATHY renaeann marie   
DO  
Work Phone:   
[phone]                          [facility]   
[place]  
   
                                        Comment on above:   Patient Update; [place]  
ent Question   
   
                                Start: 2022 Refill          Bebeto L Garri  
son   
DO  
Work Phone:   
[phone]                          [facility]   
[place]  
   
                                        Comment on above:   Refill Request   
   
                                Start: 2022 Refill          Irlanda fine MD  
Work Phone:   
[phone]                          [facility] [place]  
   
                                        Comment on above:   Refill Request   
   
                                Start: 2022 Telephone encounter Jolene NAVACNP  
Work Phone:   
[phone]                          Cardiology  
   
                                        Comment on above:   Returning Patient's   
Call   
   
                                                    Start: 2022  
End: 2022           Patient encounter procedure Irlanda Chan MD  
Work Phone:   
[phone]                          Cardiology  
   
                                        Comment on above:   Coronary artery dise  
ase involving native coronary artery of   
native   
heart without angina pectoris (Primary Dx);  
Essential hypertension;  
Hyperlipidemia, mixed;  
Palpitations   
   
                                                    Start: 2022  
End: 2022                         Subsequent hospital visit   
by physician                            Laura Tinajero MD  
Work Phone:   
[phone]                          [facility] H6 TELEMETRY  
   
                                        Comment on above:   Status post spinal s  
urgery (Primary Dx)   
   
                                Start: 2022 Telephone encounter Yanet NAVACNP  
Work Phone:   
[phone]                          [facility]   
[place]  
   
                                        Comment on above:   Patient Update   
   
                                                    Start: 2022  
End: 2022                         Subsequent hospital visit   
by physician                            Laura Tinajero MD  
Work Phone:   
[phone]                          [facility] Pre-Admit Testing  
   
                                        Comment on above:   Lumbar radiculopathy  
   
   
                                                    Start: 04-  
End: 04-           Patient encounter procedure Yanet NAVACNP  
Work Phone:   
[phone]                          [facility]   
[place]  
   
                                        Comment on above:   Pre-operative cleara  
nce (Primary Dx);  
Chronic constipation;  
Hyperlipidemia, mixed;  
Essential hypertension   
   
                                                    Start: 04-  
End: 04-           Preoperative state        Yanet NAVACNP  
Work Phone:   
[phone]                          [facility]   
[place]  
   
                                                    Start: 2022  
End: 2022     Patient encounter status Card [place]          [facility]  
ARDIAC   
TESTING  
   
                                                    Start: 2022  
End: 2022                         Subsequent hospital visit   
by physician                            Card Lab Nuclear   
Camera [place]                            [facility]   
TESTING  
   
                                        Comment on above:   Coronary artery dise  
ase involving native heart without angina   
pectoris, unspecified vessel or lesion type [I25.10]   
   
                                Start: 2022 Refill          Bebeto lord   
DO  
Work Phone:   
[phone]                          Family Medicine   
[place]  
   
                                        Comment on above:   Refill Request   
   
                                                    Start: 2022  
End: 2022                         Subsequent hospital visit   
by physician                            Jo Ann Bonilla MD  
Work Phone:   
[phone]                          [facility]  
   
                                        Comment on above:   Spondylolisthesis of  
 cervical region;  
Cervical radiculopathy   
   
                          Start: 2021 ambulatory   DAIJA S QUICK Facility:  
[facility]  
   
                          Start: 2021 ambulatory   BEBETO RODRIGUEZ Facil  
ity:[facility]  
   
                                                    Start: 2021  
End: 2021           Orders Only               Shravandylon Verduzco  
Work Phone:   
[phone]                          [facility]   
[facility]   
[facility]  
   
                                                    Start: 2019  
End: 2019     Patient encounter procedure IRLANDA Corona [facility]  
   
                                        Start: 2018   Office/outpatient vi  
sit,   
new, level 3                            Monique Hawley  
Work Phone:   
[phone]                          [facility]   
Care Physicians  
   
                          Start: 2017 Ambulatory   DAIJA QUICK  [facility]  
  
  
  
Procedures  
  
  
                          Date         Procedure    Procedure Detail Performing   
Clinician  
   
                                                    Start: 2024  
End: 2024                         Mra head w/o contrst   
material                                            Eugene Sepulveda MD  
Work Phone:   
[phone]  
   
                                        Start: 2024   Adult depression scr  
eening   
assessment                                          Yanet Shen APRN.CNP  
Work Phone:   
[phone]  
   
                                        Start: 2022   Basic metabolic pane  
l   
calcium total                                       Yasemin Vallejo PA-C  
Work Phone:   
[phone]  
   
                          Start: 2022 OPERATIVE REPORT              Physic  
[facility] Generic  
   
                          Start: 2022 Antibody screen              Laura Tinajero MD  
Work Phone:   
[phone]  
   
                                        Start: 2022   Ecg routine ecg w/le  
ast 12   
lds w/i&r                                           Anthonytess Sands APRN   
- CNP  
Work Phone:   
[phone]  
   
                          Start: 2022 Blood typing serologic abo            
    Anthonytess Sands APRN   
- [facility]  
Work Phone:   
[phone]  
   
                                        Start: 2022   Comprehensive metabo  
lic   
panel                                               Anthonytess Sands APRN   
- CNP  
Work Phone:   
[phone]  
   
                          Start: 2022 MRSA BY PCR               Yasemin arreola PA-C  
Work Phone:   
[phone]  
   
                                        Start: 2022   Urnls dip stick/tabl  
et rgnt   
auto w/o microscopy                                 Anthonyfabian Sands APRN   
- [facility]  
Work Phone:   
[phone]  
   
                                        Start: 04-   Adult depression scr  
eening   
assessment                                          Yanet Sanchez   
APRN.[facility]  
Work Phone:   
[phone]  
   
                                        Start: 2022   Myocardial spect mul  
tiple   
studies                                             Jolenezara Liang   
APRN.[facility]  
Work Phone:   
[phone]  
   
                                        Start: 2022   Mri spinal canal cer  
vical   
w/o contrast matrl                                  Jo Ann Bonilla MD  
Work Phone:   
[phone]  
  
  
  
Plan of Treatment  
  
  
                          Date         Care Activity Detail       Author  
   
                                                    Start:   
2027          Diabetes Screening  Diabetes Screening  [facility]  
   
                                                    Start:   
2026          Diabetes Screening  Diabetes Screening  [facility]  
   
                                                    Start:   
2025          DIABETES SCREEN     DIABETES SCREEN     [facility]  
   
                                                    Start:   
2025          Diabetes Screening  Diabetes Screening  [facility]  
   
                                                    Start:   
2025                              Annual PCP Team Chronic   
Disease Visit                           Annual PCP Team Chronic   
Disease Visit                           [facility]  
   
                                                    Start:   
2025                              Annual PCP Team Chronic   
Disease Visit                           Annual PCP Team Chronic   
Disease Visit                           [facility]  
   
                                                    Start:   
2025                              Annual PCP Team Chronic   
Disease Visit                           Annual PCP Team Chronic   
Disease Visit                           [facility]  
   
                                                    Start:   
2025          Anxiety Screening   Anxiety Screening   [facility]  
   
                                                    Start:   
2025                              Covid-19 Vaccine (1 -   
2023-24 season)                         Covid-19 Vaccine (1 -   
2023-24 season)                         [facility]  
   
                                                    Comment on   
above:                                  Postponed from 2023 (Declined at t  
his time)   
   
                                                    Start:   
2025          Depression Screening Depression Screening [facility]  
   
                                                    Start:   
2025          DIABETES SCREEN     DIABETES SCREEN     [facility]  
   
                                                    Start:   
04-          DIABETES SCREEN     DIABETES SCREEN     [facility]  
   
                                                    Start:   
2025  
End: 2025                         Patient encounter   
procedure                               2025 1:40 PM EST   
Office Visit Cardiology   
[address] [place] [id number] [phone] Kinjal Carmen  [facility], [address]   
[place] [address]   
[phone] (Work)   
[phone] (Fax) 1 year   
f/u                                     Cardiology  
   
                                                    Comment on   
above:                                  1 year f/u   
   
                                                    Start:   
2024          DIABETES SCREEN     DIABETES SCREEN     [facility]  
   
                                                    Start:   
2024                              Annual PCP Team Chronic   
Disease Visit                           Annual PCP Team Chronic   
Disease Visit                           [facility]  
   
                                                    Start:   
2024          BP Controlled (<130/80) BP Controlled (<130/80) [facility]  
in  
   
                                                    Start:   
2024                              Hepatitis B surface   
antibody level            LDL Cholesterol           [facility]  
   
                                                    Start:   
2024                              RSV Vaccine (1 - 1-dose   
60+ series)                             RSV Vaccine (1 - 1-dose   
60+ series)                             [facility]  
   
                                                    Comment on   
above:                                  Postponed from 02/15/2004 (Declined at t  
his time)   
   
                                                    Start:   
2024          Shingrix Vaccine (1 of 2) Shingrix Vaccine (1 of 2) [facility]  
   
                                                    Comment on   
above:                                  Postponed from 02/15/1994 (Declined at t  
his time)   
   
                                                    Start:   
2024                              Urine microalbumin   
profile                                 DTaP,Tdap,Td Vaccine (1 -   
Tdap)                                   [facility]  
   
                                                    Comment on   
above:                                  Postponed from 02/15/1964 (Declined at t  
his time)   
   
                                                    Start:   
2024  
End: 2024                         Patient encounter   
procedure                               2024 2:00 PM EDT   
Office Visit Family   
Medicine [place] [address]   
[address] [place], [place]   
[address] [phone]   
Maria E Guzmán APRN.CNP [address]   
[place], [place] [address] [phone] (Work)   
[phone] (Fax) 1   
month BP check                          Family Medicine   
[place]  
   
                                                    Comment on   
above:                                  1 month BP check   
   
                                                    Start:   
09-  
End: 09-                         Patient encounter   
procedure                               09/10/2024 2:45 PM EDT   
Office Visit Neurology   
[address] [place] [address]   
[phone] Lizette Browne PA-C [address]   
[place], [place]   
[address] [phone] (Work)   
[phone] (Fax) Follow   
up after testing                        Neurology  
   
                                                    Comment on   
above:                                  Follow up after testing   
   
                                                    Start:   
09-  
End: 12-                         C reactive protein   
[Mass/volume] in Serum or   
Plasma                                  [facility] Lab   
Routine Intractable   
migraine with aura   
without status   
migrainosus Expected:   
09/10/2024, Expires:   
12/10/2024                              [facility]  
Work Phone:   
[phone]  
   
                                                    Comment on   
above:                                  Expected: 09/10/2024, Expires: 12/10/202  
4   
   
                                                    Start:   
09-  
End: 12-                         Erythrocyte sedimentation   
rate                                    SEDIMENTATION RATE,   
WESTERGREN Lab Routine   
Intractable migraine with   
aura without status   
migrainosus Expected:   
09/10/2024, Expires:   
12/10/2024                              [facility]  
   
                                                    Comment on   
above:                                  Expected: 09/10/2024, Expires: 12/10/202  
4   
   
                                                    Start:   
2024                              Covid-19 Vaccine ( season)                         Covid-19 Vaccine (1 -   
2023-24 season)                         [facility]  
   
                                                    Start:   
2024          Influenza vaccination                     [facility]  
   
                                                    Start:   
2024  
End: 2024                         Patient encounter   
procedure                               2024 10:00 AM EDT   
Office Visit Family   
Medicine [place] [address]   
[place]   
[address] [phone]   
Maria E Guzmán APRN.CNP [address] [place] [address]   
[phone] (Work)   
[phone] (Fax) b/p   
check                                   Family Medicine   
[place]  
   
                                                    Comment on   
above:                                  b/p check   
   
                                                    Start:   
2024  
End: 2024                         Patient encounter   
procedure                                           Radiology  
   
                                                    Comment on   
above:                                  Dysphagia, unspecified type [R13.10]   
   
                                                            b/p check   
   
                                                    Start:   
2024  
End: 2024                         Patient encounter   
procedure                               2024 8:00 AM EDT   
Appointment Radiology [address] [place], [place]   
[address] [phone]   
Hoarseness [R49.0]                      Radiology  
   
                                                    Comment on   
above:                                  Hoarseness [R49.0]   
   
                                                    Start:   
2024  
End: 2024                         Patient encounter   
procedure                               2024 10:00 AM EDT   
Office Visit [facility] [address]   
[place], [place]   
[address] [phone]   
Yanet Shen APRN.CNP   
[address] [place], [place] [address] [phone] (Work)   
[phone] (Fax) BP   
Folow up                                [facility]  
   
                                                    Comment on   
above:                                  BP Folow up   
   
                                                    Start:   
2024  
End: 10-                         ACETYLCHOLINE REC BINDING   
AB                                                  [facility]  
Work Phone:   
[phone]  
   
                                                    Comment on   
above:                                  Expected: 2024, Expires: 10/11/202  
4   
   
                                                    Start:   
2024  
End: 10-                         ACETYLCHOLINE REC   
BLOCKING AB                                         [facility]  
   
                                                    Comment on   
above:                                  Expected: 2024, Expires: 10/11/202  
4   
   
                                                    Start:   
2024  
End: 10-                         ACETYLCHOLINE RECEPTOR   
MODULATING ANTIBODY                                 [facility]  
   
                                                    Comment on   
above:                                  Expected: 2024, Expires: 10/11/202  
4   
   
                                                    Start:   
2024  
End: 2024                         Patient encounter   
procedure                               2024 10:40 AM EDT   
Office Visit Neurology   
[address] [place], [place] [address] [phone] Eugene Sepulveda Jr., MD [address]   
[address] [id number] [phone] (Work)   
[phone] (Fax)   
Hoarseness [R49.0];   
Dysphagia, unspecified   
type [R13.10]; Swallowing   
disorder [R13.10]                       Neurology  
   
                                                    Comment on   
above:                                  Hoarseness [R49.0]; Dysphagia, unspecifi  
ed type [R13.10]; Swallowing   
disorder [R13.10]   
   
                                                    Start:   
2024          Influenza vaccination Influenza Vaccine (#1) [facility]  
   
                                                    Comment on   
above:                                  Postponed from 2023 (Declined at t  
his time)   
   
                                                    Start:   
2024  
End: 2024                         Patient encounter   
procedure                               2024 10:30 AM EDT   
Office Visit Cardiology   
[address] [place] [address]   
[phone] Chronic   
cough [R05.3]; SOB   
(shortness of breath)   
[R06.02]; Coronary artery   
disease due to lipid rich   
plaque [I25.10, I25.83];   
Hyperlipidemia, mixed   
[E78.2]; Essential   
hypertension [I10]                      Cardiology  
   
                                                    Comment on   
above:                                  Chronic cough [R05.3]; SOB (shortness of  
 breath) [R06.02]; Coronary   
artery disease due to lipid rich plaque [I25.10, I25.83];   
Hyperlipidemia, mixed [E78.2]; Essential hypertension [I10]   
   
                                                    Start:   
2024                              Advance Directive   
Discussion                              Advance Directive   
Discussion                              [facility]  
   
                                                    Start:   
2023                              ANNUAL PCP TEAM CHRONIC   
DISEASE VISIT                           ANNUAL PCP TEAM CHRONIC   
DISEASE VISIT                           [facility]  
   
                                                    Start:   
2023                              ANNUAL PCP TEAM CHRONIC   
DISEASE VISIT                           ANNUAL PCP TEAM CHRONIC   
DISEASE VISIT                           [facility]  
   
                                                    Start:   
2023  
End: 2024                         Erythrocyte sedimentation   
rate                                                [facility]  
Work Phone:   
[phone]  
   
                                                    Comment on   
above:                                  Expected: 2023, Expires:   
4   
   
                                                    Start:   
2023          Influenza vaccination                     [facility]  
   
                                                    Start:   
2023          Creatinine measurement Creatinine          [facility]  
   
                                                    Start:   
2023                              Potassium [Moles/volume]   
in Serum or Plasma        Potassium                 [facility]  
   
                                                    Start:   
2023          Creatinine measurement                     [facility]  
   
                                                    Start:   
2023                              Diabetes mellitus   
screening                 Diabetes Screening        [facility]  
   
                                                    Start:   
2023                              Potassium [Moles/volume]   
in Serum or Plasma        Potassium                 [facility]  
   
                                                    Start:   
2023          Potassium monitoring Potassium monitoring [facility]  
   
                                                    Start:   
04-                              Adult depression   
screening assessment      DEPRESSION SCREENING      [facility]  
   
                                                    Start:   
04-                              ANNUAL PCP TEAM CHRONIC   
DISEASE VISIT                           ANNUAL PCP TEAM CHRONIC   
DISEASE VISIT                           [facility]  
   
                                                    Start:   
04-          BP CONTROLLED (<130/80) BP CONTROLLED (<130/80) [facility]  
in  
   
                                                    Start:   
04-                              Urine microalbumin   
profile                   DTAP,TDAP,TD (1 - Tdap)   [facility]  
   
                                                    Comment on   
above:                                  Postponed from 02/15/1963 (Declined at t  
his time)   
   
                                                    Start:   
2023  
End: 2023                         Patient encounter   
procedure                               2023 Office Visit   
Otolaryngology John Kumari    
[address] [place] [address]   
[phone] (Work)   
[phone] (Fax)                      [facility]  
   
                                                    Start:   
2023                              ANNUAL PCP TEAM CHRONIC   
DISEASE VISIT                           ANNUAL PCP TEAM CHRONIC   
DISEASE VISIT                           [facility]  
   
                                                    Start:   
2023                              ADVANCE DIRECTIVE   
DISCUSSION                              ADVANCE DIRECTIVE   
DISCUSSION                              [facility]  
   
                                                    Start:   
2023          DEPRESSION ASSESSMENT DEPRESSION ASSESSMENT [facility]  
   
                                                    Start:   
2022  
End: 2023                         Comprehensive metabolic   
2000 panel - Serum or   
Plasma                                  COMP METABOLIC PANEL Lab   
Routine Mixed   
hyperlipidemia Expected:   
2022, Expires:   
2023                              [facility]  
Work Phone:   
[phone]  
   
                                                    Comment on   
above:                                  Expected: 2022, Expires:   
3   
   
                                                    Start:   
2022  
End: 2023                         Lipid 1996 panel - Serum   
or Plasma                               LIPID PANEL BASIC Lab   
Routine Mixed   
hyperlipidemia Expected:   
2022, Expires:   
2023                              [facility]  
Work Phone:   
[phone]  
   
                                                    Comment on   
above:                                  Expected: 2022, Expires:   
3   
   
                                                    Start:   
2022                              Hepatitis B surface   
antibody level            LDL CHOLESTEROL           [facility]  
   
                                                    Start:   
2022          Influenza vaccination                     [facility]  
   
                                                    Start:   
2022  
End: 2022                         Patient encounter   
procedure                               2022 Office Visit   
Orthopedic Surgery   
Laura Tinajero MD [address]   
[facility] [address]   
[place], [place] [address] [phone] (Work)   
[phone] (Fax)                      [facility]   
Medical [facility]   
Orthopedics and   
Sports Medicine   
[place]  
   
                                                    Start:   
05-  
End: 05-                         Patient encounter   
procedure                               05/10/2022 Office Visit   
Orthopedic Surgery   
Yasemin Vallejo PA-C [address] [facility] [address]   
[place] [address]   
[phone] (Work)   
[phone] (Fax)                      [facility]   
Orthopedics and   
Sports Medicine   
[place]  
   
                                                    Start:   
2022  
End: 2022                         Telemedicine consultation   
with patient                            2022 Telemedicine   
Orthopedic Surgery   
Yasemin Vallejo PA-C [address] [facility] [address]   
[place] [address]   
[phone] (Work)   
[phone] (Fax)                      [facility]   
Orthopedics and   
Sports Medicine   
[place]  
   
                                                    Start:   
2022  
End: 2022                         CBC panel - Blood by   
Automated count                         CBC Lab Routine Low   
hemoglobin Expected:   
2022, Expires:   
2022                              [facility]  
Work Phone:   
[phone]  
   
                                                    Comment on   
above:                                  Expected: 2022, Expires:   
2   
   
                                                    Start:   
2022  
End: 2022           FERRITIN BLD              FERRITIN BLD Lab Routine   
Low hemoglobin Expected:   
2022, Expires:   
2022                              [facility]  
Work Phone:   
[phone]  
   
                                                    Comment on   
above:                                  Expected: 2022, Expires:   
2   
   
                                                    Start:   
2022  
End: 2022           IRON + TIBC               IRON + TIBC Lab Routine   
Low hemoglobin Expected:   
2022, Expires:   
2022                              [facility]  
Work Phone:   
[phone]  
   
                                                    Comment on   
above:                                  Expected: 2022, Expires:   
2   
   
                                                    Start:   
2022  
End: 2022                         Patient encounter   
procedure                               2022 Appointment   
General Surgery Laura Tinajero MD [address] [facility] [address] [place]   
[address] [phone] (Work)   
[phone] (Fax)                      [facility]  
   
                                                    Start:   
04-  
End: 06-                         CBC panel - Blood by   
Automated count                                     [facility]  
Work Phone:   
[phone]  
   
                                                    Comment on   
above:                                  Expected: 04/15/2022, Expires: 06/15/202  
2   
   
                                                    Start:   
04-  
End: 06-                         Comprehensive metabolic   
2000 panel - Serum or   
Plasma                                              [facility]  
Work Phone:   
[phone]  
   
                                                    Comment on   
above:                                  Expected: 04/15/2022, Expires: 06/15/202  
2   
   
                                                    Start:   
2022  
End: 2022                         Patient encounter   
procedure                               2022 Office Visit   
Orthopedic Surgery   
Laura Tinajero MD [address]   
[facility] [address]   
[place] [address] [phone] (Work)   
[phone] (Fax)                      [facility]   
Medical [facility]   
Orthopedics and   
Sports Medicine   
[place]  
   
                                                    Start:   
2022                              Annual Wellness Visit   
(AWV)                                   Annual Wellness Visit   
(AWV)                                   [facility]  
   
                                                    Start:   
2022                              ADVANCE DIRECTIVE   
DISCUSSION                              ADVANCE DIRECTIVE   
DISCUSSION                              [facility]  
   
                                                    Start:   
2022          DEPRESSION ASSESSMENT DEPRESSION ASSESSMENT [facility]  
   
                                                    Start:   
2021          Influenza vaccination                     [facility]  
   
                                                    Start:   
2020                              Influenza vaccination   
given                                   Sequential Influenza   
Vaccine (#1)                            [facility]  
   
                                                    Start:   
2017                Influenza vaccination     SEQUENTIAL INFLUENZA   
VACCINE (#1)                            [facility]  
   
                                                    Start:   
02-          Fall risk assessment Falls Risk Assessment [facility]  
   
                                                    Start:   
02-                              Pneumococcal 65+ years   
Vaccine (1 - PCV)                       Pneumococcal 65+ years   
Vaccine (1 - PCV)                       [facility]  
   
                                                    Start:   
02-                              Pneumococcal 65+ years   
Vaccine (1 of 1 - PPSV23)               Pneumococcal 65+ years   
Vaccine (1 of 1 - PPSV23)               [facility]  
   
                                                    Start:   
02-                Pneumococcal vaccination  PNEUMOCOCCAL VACCINE AGE   
65+ (1 of 2 - PCV13)                    [facility]  
   
                                                    Start:   
02-                              Pneumococcal Vaccine: 65+   
(1 of 1 - PCV)                          Pneumococcal Vaccine: 65+   
(1 of 1 - PCV)                          [facility]  
   
                                                    Start:   
02-                              Pneumococcal Vaccine: 65+   
Years (1 - PCV)                         Pneumococcal Vaccine: 65+   
Years (1 - PCV)                         [facility]  
   
                                                    Start:   
02-                              PNEUMOVAX AGE 65 AND OVER   
WITH 5YR LOOKBACK (#1)                  PNEUMOVAX AGE 65 AND OVER   
WITH 5YR LOOKBACK (#1)                  [facility]  
   
                                                    Start:   
02-                              RSV Vaccine (1 - 1-dose   
60+ series)                             RSV Vaccine (1 - 1-dose   
60+ series)                             [facility]  
   
                                                    Start:   
02-          Zoster vacc, sc     ZOSTER VACCINE      [facility]  
   
                                                    Start:   
02-                              Screening for   
osteoporosis                            DEXA (modify frequency   
per FRAX score)                         [facility]  
   
                                                    Start:   
02-                              Administration of herpes   
zoster vaccine            Zoster Vaccines (1 of 2)  [facility]  
   
                                                    Start:   
02-          Shingles Vaccine (1 of 2) Shingles Vaccine (1 of 2) [facility]  
   
                                                    Start:   
02-          SHINGRIX VACCINE (1 of 2) SHINGRIX VACCINE (1 of 2) [facility]  
   
                                                    Start:   
02-          Zoster Vaccines (1 of 2) Zoster Vaccines (1 of 2) [facility]  
th  
   
                                                    Start:   
02-                              Screening for malignant   
neoplasm of breast        Mammogram                 [facility]  
   
                                                    Start:   
02-                              DTaP/Tdap/Td vaccine (1 -   
Tdap)                                   DTaP/Tdap/Td vaccine (1 -   
Tdap)                                   [facility]  
   
                                                    Start:   
02-                              DTaP/Tdap/Td Vaccines (1   
- Tdap)                                 DTaP/Tdap/Td Vaccines (1   
- Tdap)                                 [facility]  
   
                                                    Start:   
02-                              Urine microalbumin   
profile                                             [facility]  
   
                                                    Start:   
02-          BP CONTROLLED (<130/80) BP CONTROLLED (<130/80) [facility]  
   
                                                    Start:   
02-                              Hepatitis C antibody,   
confirmatory test         Hepatitis C Screening     [facility]  
   
                                                    Start:   
02-          HEPATITIS C SCREENING HEPATITIS C SCREENING [facility]  
   
                                                    Start:   
02-          Hepatitis C screening                     [facility]  
   
                                                    Start:   
02-          SPIROMETRY          SPIROMETRY          [facility]  
   
                                                    Start:   
02-          COVID-19 Vaccine (1 of 2) COVID-19 Vaccine (1 of 2) [facility]  
   
                                                    Start:   
02-                              Adolescent depression   
screening assessment                                [facility]  
   
                                                    Start:   
02-          Depression Screen   Depression Screen   [facility]  
   
                                                    Start:   
02-                              Depression screening   
using PHQ-9 (Patient   
Health Questionnaire 9)   
score                                   Depression Screening   
(PHQ-2/9)                               [facility]  
   
                                                    Start:   
02-          Lipid panel                             [facility]  
   
                                                    Start:   
02-                              Pneumococcal Vaccine: 65+   
(1 - PCV)                               Pneumococcal Vaccine: 65+   
(1 - PCV)                               [facility]  
   
                                                    Start:   
02-                              Pneumococcal Vaccine: Age   
65+ (1 - PCV)                           Pneumococcal Vaccine: Age   
65+ (1 - PCV)                           [facility]  
   
                                                    Start:   
02-                              PNEUMOCOCCAL: 65+ (1 -   
PCV)                                    PNEUMOCOCCAL: 65+ (1 -   
PCV)                                    [facility]  
   
                                                    Start:   
02-          COVID-19 Vaccine (1) COVID-19 Vaccine (1) [facility]  
   
                                                    Start:   
02-                              History and physical   
examination, annual for   
health maintenance        Wellness Visit            [facility]  
   
                                                    Start:   
1944          COVID-19 VACCINE (#1) COVID-19 VACCINE (#1) [facility]  
   
                                                    Start:   
1944          Screening colonoscopy COLONOSCOPY         [facility]  
   
                                                    Start:   
1944  
End: 1944                         Screening for   
osteoporosis                                        [facility]  
   
                                                    Start:   
1944  
End: 1944     Tetanus vaccination                     [facility]  
   
                                                    Start:   
1944          Creatinine measurement Creatinine monitoring [facility]  
   
                                                    Start:   
1944          Fall risk assessment Falls Risk Assessment [facility]  
   
                                                    Start:   
1944                              Hepatitis B Vaccines (1   
of 3 - 3-dose series)                   Hepatitis B Vaccines (1   
of 3 - 3-dose series)                   [facility]  
   
                                                    Start:   
1944          Hepatitis C screening Hepatitis C screen  [facility]  
   
                                                    Start:   
1944          Lipid panel         Lipid Panel         [facility]  
   
                                                    Start:   
1944          Potassium monitoring Potassium monitoring [facility]  
   
                                                    Start:   
1944          Screening mammography Mammogram           [facility]  
   
                                                      
End: 2022                         Basic metabolic 2000   
panel - Serum or Plasma                 Basic Metabolic Panel Lab   
Routine Tomorrow AM for 1   
Occurrences starting   
2022 until   
2022                              [facility]  
Work Phone:   
[phone]  
   
                                                    Comment on   
above:                                  Tomorrow AM for 1 Occurrences starting 0  
2022 until 2022   
   
                                                      
End: 2022                         CBC W Auto Differential   
panel - Blood                           CBC with Auto   
Differential Lab Routine   
Tomorrow AM for 1   
Occurrences starting   
2022 until   
2022                              [facility]  
Work Phone:   
[phone]  
   
                                                    Comment on   
above:                                  Tomorrow AM for 1 Occurrences starting 0  
2022 until 2022   
   
                                                Continuous pulse oximetry Pulse   
oximetry,   
continuous post-op   
Respiratory Care Routine   
Every 4hr until   
discontinued starting   
2022                              [facility]  
Work Phone:   
[phone]  
   
                                                    Comment on   
above:                                  Every 4hr until discontinued starting    
   
                                                      
End: 2023                         Dup-scan xtr veins   
complete bilateral study                US DVT LOWER BILAT   
Radiology Routine   
Bilateral leg pain 1   
Occurrences starting   
2022 until   
2023                              [facility]  
Work Phone:   
[phone]  
   
                                                    Comment on   
above:                                  1 Occurrences starting 2022 until   
2023   
   
                                                      
End: 2025           Echocardiography          ECHO Cardiology Routine   
Chronic cough SOB   
(shortness of breath)   
Coronary artery disease   
due to lipid rich plaque   
Hyperlipidemia, mixed   
Essential hypertension 1   
Occurrences starting   
2024 until   
2025                              [facility]  
   
                                                    Comment on   
above:                                  1 Occurrences starting 2024 until   
2025   
   
                                                EKG 12 Lead     EKG 12 Lead ECG   
Routine   
2022 2:04 PM EDT                  [facility]  
Work Phone:   
[phone]  
   
                                                      
End: 2022     FL Greater Than 1 Hour                     [facility]  
Work Phone:   
[phone]  
   
                                                    Comment on   
above:                                  Once for 1 Occurrences starting 20 until 2022   
   
                                                      
End: 2025           MR Brain WO contrast      MRI BRAIN WO IVCON   
Radiology Routine   
Hoarseness Dysphagia,   
unspecified type   
Swallowing disorder S/P   
cervical spinal fusion 1   
Occurrences starting   
2024 until   
2025                              [facility]  
   
                                                    Comment on   
above:                                  1 Occurrences starting 2024 until   
2025   
   
                                                      
End: 2025                         MRA Head vessels WO   
contrast                                MRA BRAIN WO IVCON   
Radiology Routine   
Hoarseness Dysphagia,   
unspecified type   
Swallowing disorder S/P   
cervical spinal fusion 1   
Occurrences starting   
2024 until   
2025                              [facility]  
   
                                                    Comment on   
above:                                  1 Occurrences starting 2024 until   
2025   
   
                                                      
End: 2025                         MRA Neck vessels WO   
contrast                                MRA CAROTID WO IVCON   
Radiology Routine   
Hoarseness Dysphagia,   
unspecified type   
Swallowing disorder S/P   
cervical spinal fusion 1   
Occurrences starting   
2024 until   
2025                              [facility]  
   
                                                    Comment on   
above:                                  1 Occurrences starting 2024 until   
2025   
   
                                                      
End: 2022                         MRI CERVICAL SPINE WO   
CONTRAST                                MRI CERVICAL SPINE WO   
CONTRAST Imaging Routine   
Spondylolisthesis of   
cervical region Cervical   
radiculopathy 1   
Occurrences starting   
2022 until   
2022                              [facility]  
Work Phone:   
[phone]  
   
                                                    Comment on   
above:                                  1 Occurrences starting 2022 until   
2022   
   
                                                            OUTSIDE VENDOR CARDI  
AC   
OUTPATIENT EXTENDED   
RHYTHM RECORDING (WITHOUT   
TELEMETRY)                              OUTSIDE VENDOR CARDIAC   
OUTPATIENT EXTENDED   
RHYTHM RECORDING (WITHOUT   
TELEMETRY) Holter Routine   
Palpitations Ordered:   
2022                              [facility]  
Work Phone:   
[phone]  
   
                                                    Comment on   
above:                                  Ordered: 2022   
   
                                                            Oxygen therapy [Mini  
mum   
Data Set]                               Initiate Oxygen Therapy   
Protocol Respiratory Care   
Routine As Needed until   
discontinued starting   
2022                              [facility]  
Work Phone:   
[phone]  
   
                                                    Comment on   
above:                                  As Needed until discontinued starting    
   
                                                            PT PLAN OF CARE   
CERTIFICATION                           PT PLAN OF CARE   
CERTIFICATION Procedures   
Routine Injury of right   
shoulder, subsequent   
encounter Fall,   
subsequent encounter Neck   
pain, chronic Right   
shoulder injury,   
subsequent encounter   
Ordered: 2022                     [facility]  
Work Phone:   
[phone]  
   
                                                    Comment on   
above:                                  Ordered: 2022   
   
                                                      
End: 2023                         Radex spine cervical 4 or   
5 views                                 XR CERV OTHER 4V   
AP/LAT/OBL Radiology   
Routine Injury of right   
shoulder, subsequent   
encounter Fall,   
subsequent encounter Neck   
pain, chronic 1   
Occurrences starting   
2022 until   
2023                              [facility]   
[employer]  
Work Phone:   
[phone]  
   
                                                    Comment on   
above:                                  1 Occurrences starting 2022 until   
2023   
   
                                                            Radex spine cervical  
 4 or   
5 views                                 XR CERV OTHER 4V   
AP/LAT/OBL Radiology   
Routine Injury of right   
shoulder, subsequent   
encounter Fall,   
subsequent encounter Neck   
pain, chronic 2022   
2:03 PM EST                             [facility]   
[employer]  
Work Phone:   
[phone]  
   
                                                      
End: 2023           Screening colonoscopy     COLONOSCOPY SCREENING   
Endoscopy Routine   
Screening for colon   
cancer 1 Occurrences   
starting 2022 until   
2023                              [facility]  
Work Phone:   
[phone]  
   
                                                    Comment on   
above:                                  1 Occurrences starting 2022 until   
2023   
   
                                                      
End: 2023                         US LEG VEIN DVT UNL VAS   
LAB                                     US LEG VEIN DVT UNL VAS   
LAB Vascular Lab Routine   
Bilateral leg pain 1   
Occurrences starting   
2022 until   
2023                              [facility]  
Work Phone:   
[phone]  
   
                                                    Comment on   
above:                                  1 Occurrences starting 2022 until   
2023   
   
                                                      
End: 2022     Vitamin D 1,25 Dihydroxy                     [facility]  
Work Phone:   
[phone]  
   
                                                    Comment on   
above:                                  One Time for 1 Occurrences starting  until 2022   
   
                                                      
End: 2025           XR Chest PA and Lateral   XR CHEST 2V FRONTAL/LAT   
Radiology Routine Chronic   
cough SOB (shortness of   
breath) 1 Occurrences   
starting 2024 until   
2025                              [facility]  
Work Phone:   
[phone]  
   
                                                    Comment on   
above:                                  1 Occurrences starting 2024 until   
2025   
   
                                                XR Chest PA and Lateral XR CHEST  
 2V FRONTAL/LAT   
Radiology Routine Chronic   
cough SOB (shortness of   
breath) 2024 12:23   
PM EDT                                  [facility]  
   
                                                      
End: 2023                         XR HUMERUS 2V AP/LAT   
RIGHT                                   XR HUMERUS 2V AP/LAT   
RIGHT Radiology Routine   
Injury of right shoulder,   
subsequent encounter   
Fall, subsequent   
encounter Neck pain,   
chronic 1 Occurrences   
starting 2022 until   
2023                              [facility]  
Work Phone:   
[phone]  
   
                                                    Comment on   
above:                                  1 Occurrences starting 2022 until   
2023   
   
                                                            XR HUMERUS 2V AP/LAT  
   
RIGHT                                   XR HUMERUS 2V AP/LAT   
RIGHT Radiology Routine   
Injury of right shoulder,   
subsequent encounter   
Fall, subsequent   
encounter Neck pain,   
chronic 2022 2:03   
PM EST                                  [facility]  
Work Phone:   
[phone]  
   
                                                      
End: 2023                         XR SHOULDER GENERAL 3V OR   
MORE AP/TRUE AP/OTHER   
RIGHT                                   XR SHOULDER GENERAL 3V OR   
MORE AP/TRUE AP/OTHER   
RIGHT Radiology Routine   
Injury of right shoulder,   
subsequent encounter   
Fall, subsequent   
encounter Neck pain,   
chronic 1 Occurrences   
starting 2022 until   
2023                              [facility]  
Work Phone:   
[phone]  
   
                                                    Comment on   
above:                                  1 Occurrences starting 2022 until   
2023   
   
                                                            XR SHOULDER GENERAL   
3V OR   
MORE AP/TRUE AP/OTHER   
RIGHT                                   XR SHOULDER GENERAL 3V OR   
MORE AP/TRUE AP/OTHER   
RIGHT Radiology Routine   
Injury of right shoulder,   
subsequent encounter   
Fall, subsequent   
encounter Neck pain,   
chronic 2022 2:03   
PM EST                                  [facility]  
Work Phone:   
[phone]  
   
                                                                 [facility]  
c  
   
                                                                 [facility]  
c  
   
                                                                 [facility]  
c  
   
                                                                 [facility]  
c  
   
                                                                 [facility]  
c  
   
                                                                 [facility]  
c  
   
                                                                 [facility]  
c  
   
                                                                 [facility]  
c  
   
                                                                 [facility]  
c  
   
                                                                 [facility]  
c  
   
                                                                 [facility]  
c  
   
                                                                 [facility]  
c  
   
                                                                 [facility]  
c  
   
                                                                 [facility]  
c  
  
  
  
Payers  
  
  
                          Date         Payer Category Payer        Policy ID  
   
                                2022      Medicare        AETNA MEDICARE A  
ETNA   
MEDICARE PPO [id number]   
2022-Present   
[phone] [address]   
[place] [address] PPO              [id number]   
[id number]  
[id number]  
   
                                2018      Medicare        AETNA MANAGED ME  
DICARE AETNA   
MEDICARE PLAN (PPO) [id number]   
2018-Present                        [id number]   
[id number]  
[id number]  
   
                          2018   Medicare                  [id number]   
[id number]  
[id number]  
   
                          2018   Medicare                  [id number]  
[id number]  
[id number]  
   
                          2015   Medicare                  [id number]  
   
                          1945   Unknown                   [id number]   
[id number]  
   
                          1945   Unknown                   [id number]   
[id number]  
   
                          1945   Unknown                   [id number]   
[id number]  
   
                          1945   Unknown                   [id number]   
[id number]  
   
                          1945   Unknown                   [id number]   
[id number]  
   
                          1945   Unknown                   [id number]   
[id number]  
   
                          1945   Unknown                   [id number]   
[id number]  
   
                                       Medicare                  [id number]   
[id number]  
  
  
  
Social History  
  
  
                          Date         Type         Detail       Facility  
   
                                                    Start: 2018  
End: 2022                         Tobacco smoking status   
NHIS                      Never smoker              [facility]  
   
                          Start: 1945 Sex Assigned At Birth Not on file  [facility]  
[facility]  
   
                                                    Start: 2018  
End: 2022                         Tobacco use and   
exposure                  Never used                [facility]  
   
                                                    Start: 2018  
End: 2023           Alcohol intake            Current non-drinker of   
alcohol (finding)                       [facility]  
   
                                                            Tobacco smoking stat  
[facility]                                    Tobacco smoking   
consumption unknown                     [facility]  
Work Phone:   
[phone]  
   
                          Start: 1945 Sex Assigned At Birth Female       [facility]  
[facility]  
   
                                                    Start: 2022  
End: 2023                         Exposure to SARS-CoV-2   
(event)                   Not sure                  [facility]  
   
                                                    Start: 2022  
End: 09-           Alcohol intake            Lifetime non-drinker   
(finding)                               [facility]  
   
                                                    Start: 01-  
End: 2022                         History SDOH Alcohol   
Frequency                 1                         [facility]  
   
                                                    Start: 2022  
End: 2022                         Exposure to SARS-CoV-2   
(event)                   Yes                       [facility]  
Work Phone:   
[phone]  
   
                                        Start: 2022   History SDOH Housing  
   
Unable to Pay             3                         [facility]  
   
                                                    Start: 2022  
End: 2023                         History of Social   
function                                            [facility]  
   
                                                    Start: 2022  
End: 2023                         Social connection and   
isolation panel                                     [facility]  
   
                                                            In a typical week, h  
ow   
many times do you talk   
on the telephone with   
family, friends, or   
neighbors?                Patient refused           [facility]  
   
                                                            Are you now ,  
   
, ,   
, never   
 or living with   
a partner?                Refused                   [facility]  
   
                                                            (I/We) worried [employer]   
(my/our) food would run   
out before (I/we) got   
money to buy more.        DK or Refused             [facility]  
   
                                Start: 2022 Gender identity Identifies as   
female   
gender (finding)                        [facility]  
  
  
  
Clinical Notes 2022 to 09-  
   Patient InstructionsLizette Browne PA-C - 09/10/2024 3:00 PM EDTPatient   
InstructionsMaria E Guzmán APRN.CNP - 2024 10:14 AM Kanika Harris RT(KHUSHI) - 2024 8:00 AM EDTInstructions  
  
                                Note Date & Type Note            Facility  
   
                                        09- Instructions   
  
  
Lizette Browne PA-C - 09/10/2024   
3:09 PM EDTFormatting of this note is   
different from the original.  
Speech therapy evaluation  
Supplements for headaches noted below  
Increase water intake to 60 ounces a   
day  
Follow with your primary care   
provider for stroke prevention  
General guidelines for stroke risk   
factor management, if present  
Hypertension  
Target blood pressure <140/90,   
<130/80 for high risk; normal is   
120/80  
Hyperlipidemia  
Target total cholesterol < 200  
Target LDL <100, < 70 for high risk  
Target HDL >45 for men, >55 for women  
Target triglycerides <150  
Diabetes  
Target HgbA1c <7%  
Smoking  
Target is smoking cessation  
Physical inactivity  
Target is exercise at least 3 times   
per week  
Target waist circumference, in inches   
is <35 for women and <40 for men  
Follow up in 3 months for headaches  
  
Headache Preventive Treatment:  
Please keep in mind that it takes 4-6   
weeks for the medication to start   
working well and 2-3 months at the   
appropriate dose before deciding if   
it will be useful or not. If it is   
not helping at all by this time, then   
we will discuss other medications to   
try. Supplements may take 3-6 months   
until you see full effect.  
  
Natural supplements:  
Magnesium Oxide 500 mg at bed  
Coenzyme Q10 300 mg in AM  
Vitamin B2- 200 mg twice a day  
Feverfew 50 mg twice a day  
  
Vitamins and herbs that show   
potential  
  
Magnesium: Magnesium (250 mg twice a   
day or 500 mg at bed) has a relaxant   
effect on smooth muscles such as   
blood vessels. Individuals suffering   
from frequent or daily headache   
usually have low magnesium levels   
which can be increase with daily   
supplementation of 400-750 mg. Three   
trials found 40-90% average headache   
reduction when used as a   
preventative. Magnesium also   
demonstrated the benefit in   
menstrually related migraine.   
Magnesium is part of the messenger   
system in the serotonin cascade and   
it is a good muscle relaxant. It is   
also useful for constipation which   
can be a side effect of other   
medications used to treat migraine.   
Good sources include nuts, whole   
grains, and tomatoes.  
Magnesium comes in many different   
forms:  
Magnesium glycinate is a good choice   
for those with a sensitive stomach   
who have gastrointestinal side   
effects such as diarrhea with other   
forms of magnesium. It is anecdotally   
also helpful with anxiety and sleep.   
Magnesium threonate also has low risk   
of gastrointestinal side effects and   
anecdotally helpful with cognitive   
function and brain fog symptoms.   
Magnesium malate has low   
gastrointestinal side effects and is   
reportedly more energizing and   
anecdotally often helpful in   
fibromyalgia and chronic fatigue   
syndrome. Magnesium citrate is one of   
the most studied, popular, and   
well-absorbed forms of magnesium. It   
can also be mixed easily with liquids   
if you can't take pills. However, it   
comes with a higher risk of diarrhea   
and gastrointestinal side effects,   
although this could be helpful for   
those with constipation. Magnesium   
oxide is also well studied, cheap,   
and often used for heartburn and   
indigestion. However, it is not well   
absorbed and can have some laxative   
side effects as well, so can also be   
helpful for constipation.  
  
Riboflavin (vitamin B 2) 200 mg twice   
a day. This vitamin assists nerve   
cells in the production of ATP a   
principal energy storing molecule. It   
is necessary for many chemical   
reactions in the body. There have   
been at least 3 clinical trials of   
riboflavin using 400 mg per day all   
of which suggested that migraine   
frequency can be decreased. All 3   
trials showed significant improvement   
in over half of migraine sufferers.   
The supplement is found in bread,   
cereal, milk, meat, and poultry. Most   
Americans get more riboflavin than   
the recommended daily allowance,   
however riboflavin deficiency is not   
necessary for the supplements to help   
prevent headache.  
  
Feverfew: Feverfew is a common garden   
herb native to [place] and popular in   
[place] as a treatment for   
disorders typically controlled by   
aspirin. The mechanism of action is   
unknown but is believed to be related   
to a chemical called parthenolide   
which helps the body use serotonin   
more effectively. Serotonin helps   
prevent migraine and assists with   
resolution when it occurs.   
Parthenolide also inhibits the   
release of histamine which is linked   
to pain and inflammation.  
  
Consistency of active ingredients in   
different products can be a problem.   
Some formulations don't have the   
active ingredient (parthenolide) that   
prevents migraine. A parthenolide   
content of 0.2% is generally   
recommended. Typical dosage is one   
capsule 3 times a day.  
  
Coenzyme Q10: This is present in   
almost all cells in the body and is   
critical component for the conversion   
of energy. Recent studies have shown   
that a nutritional supplement of   
CoQ10 can reduce the frequency of   
migraine attacks by improving the   
energy production of cells as with   
riboflavin. Doses of 150 mg twice a   
day have been shown to be effective.  
  
Melatonin: Increasing evidence shows   
correlation between melatonin   
secretion and headache conditions.   
Melatonin supplementation has   
decreased headache intensity and   
duration. It is widely used as a   
sleep aid. Sleep is natures way of   
dealing with migraine. A dose of 3 mg   
is recommended to start for headaches   
including cluster headache. Higher   
doses up to 15 mg has been reviewed   
for use in Cluster headache and have   
been used.  
The rationale behind using melatonin   
for cluster is that many theories   
regarding the cause of Cluster   
headache center around the disruption   
of the normal circadian rhythm in the   
brain. This helps restore the normal   
circadian rhythm.  
  
Ginger: Ginger has a small amount of   
antihistamine and anti-inflammatory   
action which may help headache. It is   
primarily used for nausea and may aid   
in the absorption of other   
medications.  
  
HEADACHE DIET: Foods and beverages   
which may trigger migraine  
Note that only 20% of headache   
patients are food sensitive. You will   
know if you are food sensitive if you   
get a headache consistently 20   
minutes to 2 hours after eating a   
certain food. Only cut out a food if   
it causes headaches, otherwise you   
might remove foods you enjoy! What   
matters most for diet is to eat a   
well balanced healthy diet full of   
vegetables and low fat protein, and   
to not miss meals.  
  
Chocolate, other sweets  
  
ALL cheeses except cottage and cream   
cheese  
  
Dairy products, yogurt, sour cream,   
ice cream  
  
Liver  
  
Meat extracts (Bovril, Marmite, meat   
tenderizers)  
  
Meats or fish which have undergone   
aging, fermenting, pickling or   
smoking. These include:   
Hotdogs,salami,Lox,sausage,  
mortadellas,smoked salmon, pepperoni,   
Pickled herring  
  
Pods of broad bean (English beans,   
Chinese pea pods, Italian (shayy)   
beans, lima and navy beans  
  
Ripe avocado, ripe banana  
  
Yeast extracts or active yeast   
preparations such as Briones's or   
Kwame's (commercial bakes goods   
are permitted)  
  
Tomato based foods, pizza (lasagna,   
etc.)  
  
MSG (monosodium glutamate) is   
disguised as many things; look for   
these common aliases:  
Monopotassium glutamate  
Autolysed yeast  
Hydrolysed protein  
Sodium caseinate  
flavorings  
all natural preservatives   
  
Nutrasweet  
  
Avoid all other foods that   
convincingly provoke headaches.  
  
Headache Prevention Strategies:  
  
1. Maintain a headache diary; learn   
to identify and avoid triggers.   
Common triggers include:  
  
Emotional triggers:  
Emotional/Upset family or friends  
Emotional/Upset occupation  
Business reversal/success  
Anticipation anxiety  
Crisis-serious  
Post-crisis periodNew job/position  
  
Physical triggers:  
Vacation Day  
Weekend  
Strenuous Exercise  
[place]  
New Move  
Menstrual Day  
Physical Illness  
Oversleep/Not enough sleep  
Weather changes  
Light: Photophobia or light   
sesnitivity treatment involves a   
balance between desensitization and   
reduction in overly strong input. Use   
dark polarized glasses outside, but   
not inside. Avoid bright or   
fluorescent light, but do not dim   
environment to the point that going   
into a normally lit room hurts.   
Consider FL-41 tint lenses, which   
reduce the most irritating   
wavelengths without blocking too much   
light. These can be obtained at   
[employer] or [employer]  
Foods: see list above.  
  
2. Limit use of acute treatments   
(over-the-counter medications,   
triptans, etc.) to no more than 2   
days per week or 10 days per month to   
prevent medication overuse headache   
(rebound headache).  
  
3. Follow a regular schedule   
(including weekends and holidays):  
Don't skip meals. Eat a balanced   
diet.  
8 hours of sleep nightly.  
Minimize stress.  
Exercise 30 minutes per day. Being   
overweight is associated with a 5   
times increased risk of chronic   
migraine.  
Keep well hydrated and drink 6-8   
glasses of water per day.  
  
4. Initiate non-pharmacologic   
measures at the earliest onset of   
your headache.  
Rest and quiet environment.  
Relax and reduce stress.   
Wfquira9Hgzhu is a free stephani that can   
instruct you on some simple relaxtion   
and breathing techniques.   
Http://Leatt.Springbot is a free website   
that provides teaching videos on   
relaxation. Also, there are many apps   
that can be downloaded for mindful   
relaxation. An stephani called YOGA NIDRA   
will help walk you through   
mindfulness.  
Cold compresses.  
  
5. Don't wait!! Take the maximum   
allowable dosage of prescribed   
medication at the first sign of   
migraine.  
  
6. Compliance: Take prescribed   
medication regularly as directed and   
at the first sign of a migraine.  
  
7. Communicate: Call your physician   
when problems arise, especially if   
your headaches change, increase in   
frequency/severity, or become   
associated with neurological symptoms   
(weakness, numbness, slurred speech,   
etc.).  
  
8. Headache/pain management   
therapies: Consider various   
complementary methods, including   
medication, behavioral therapy,   
psychological counselling,   
biofeedback, massage therapy,   
acupuncture, dry needling, and other   
modalities. Such measures may reduce   
the need for medications. Counseling   
for pain management, where patients   
learn to function and ignore/minimize   
their pain, seems to work very well.  
  
9. Recommend changing family's   
attention and focus away from   
patient's headaches. Instead,   
emphasize daily activities. If first   
question of day is 'How are your   
headaches/Do you have a headache   
today?', then patient will constantly   
think about headaches, thus making   
them worse. Goal is to re-direct   
attention away from headaches, toward   
daily activities and other   
distractions.  
Electronically signed by Lizette Browne PA-C at 09/10/2024 3:21 PM   
EDT  
  
documented in this encounter            [facility]  
   
                                        09- Note     HNO ID: [id number]  
Author: LIZETTE BROWNE PA-C  
Service: ?  
Author Type: Physician Assistant  
Type: Progress Notes  
Filed: 09/10/2024 15:50  
Note Text:  
ESTABLISHED PATIENT VISIT  
Last visit: 24 with Dr. Sepulveda  
ASSESSMENT/PLAN:  
1. Hoarseness - ICD9: 784.42, ICD10:   
R49.0 (primary diagnosis)  
2. Dysphagia, unspecified type -   
ICD9: 787.20, ICD10: R13.10  
3. Swallowing disorder - ICD9:   
787.20, ICD10: R13.10  
4. S/P cervical spinal fusion - ICD9:   
V45.4, ICD10: Z98.1  
5. Wheezing [R06.2] - ICD9: 786.07,   
ICD10: R06.2  
6. Episode of change in speech   
[R47.89] - ICD9: 784.59, ICD10:   
R47.89  
Patient with dysphagia, cough, change   
in voice; with symptoms appearing to   
have  
started following C spine fusion as   
above. Since that time, she has   
followed  
with spine surgeons as well as PCP   
and ENT. Endoscopy unremarkable per   
report.  
While dysphagia can occur as a   
complication following anterior C   
spine surgery,  
in most cases it is mild and improves   
over time -- pt feels her symptoms   
are  
worsening. Thus, need to consider   
other possible etiologies. With no  
structural abnormality on ENT   
evaluation, need to consider   
following in ddx:  
-Possible posterior fossa stroke with   
patient also reporting history of   
other  
stroke like symptoms and stroke risk   
factors as above. To further evaluate   
will  
get MRI brain with MRA head and neck.  
-Possible neuromuscular disorder such   
as MG. Could not provoke impairment   
in  
EOM or ptosis during visit. Does   
report fatigue and worsening function   
in warm  
temperatures. Will get following labs   
to assess:  
- ACETYLCHOLINE REC BINDING AB  
- ACETYLCHOLINE RECEPTOR MODULATING   
ANTIBODY  
- ACETYLCHOLINE REC BLOCKING AB  
-Patient is on Lisinopril which could   
cause cough and angioedema. Will ask   
PCP  
if Lisinopril could be held to see if   
any improvement of symptoms.  
-Refer to speech therapy for swallow   
evaluation including imaging studies   
if  
they feel appropriate (no prior MBS).  
-Symptoms could still in fact be   
secondary to surgery, but feel need   
to rule out  
other possible causes as above.  
Eugene Sepulveda MD  
CHIEF COMPLAINT: follow up  
HISTORY OF PRESENT ILLNESS: Elmira Warner is a 79 year old female,   
BMI 30.4  
kg/m2 with a PMH significant for   
lipidemia, CAD with MI, hypertension,   
asthma,  
TIA, obesity, cervical surgery.  
Having trouble swallowing, last seen   
on 24 by Dr. Sepulveda. Started   
after C  
spine surgery, coughing. Worsening   
over time, concern for   
stroke.Concerning for  
MG as well. Referred to speech   
therapy for swallowing eval. MRI   
showing multiple  
remote infarcts, MRA head and neck   
with no large vessel occlusion. Had   
recent  
ECHO. Labs normal.  
Patient presents for follow-up   
appointment today. Notes that he   
still having  
issues with swallowing, fluctuates   
but does occasionally choke on hard   
foods.  
Does not seem to choke on liquids.   
Did not follow with speech therapy   
because  
she does not feel she needs help with   
her speech. Was unaware they could do  
swallowing evaluation. No other   
changes since previous appointment.  
Patient's primary concern today is   
headaches. Notes that for the last   
few weeks  
has been having regular headaches,   
prior to this she is only having   
about 1 a  
month. Has longstanding history of   
migraines since she was a young   
teenager.  
Notes that she has not been drinking   
much water, attributes most of the  
headaches secondary to the weather   
changing. Notes that she had a lot   
headaches  
before surgery on her neck but feels   
they may have worsened afterwards.   
Will  
occasionally get sharp shooting pains   
from the back to the back of the ear   
on  
the left side, seem to be triggered   
by more physical activity. Notes that   
she  
has not had 1 of these in many days   
because she has not been as   
physically  
active outside. Also gets more   
pressure type headaches with   
associated  
photophobia and dizziness. These can   
last all day, typically she takes   
caffeine  
and goes and lays down which seems to   
break the headache. Does not like to   
take  
medication for these. Is currently on   
magnesium and co-Q10. These  
pressure-like headaches and to be   
triggered by weather changes as well   
as  
certain smells. Does have significant   
fatigue associate with them as well.  
Will occasionally get zigzags in her   
vision that last for about 10 minutes   
when  
these occur. Again, have these more   
frequently the last few weeks.  
REVIEW OF SYSTEMS  
GENERAL:No weight loss, malaise or   
fevers.  
HEENT:Negative for frequent or   
significant headaches, No changes in   
hearing or  
vision, no nose bleeds or other nasal   
problems  
NECK:Negative for lumps, goiter, pain   
and significant neck swelling  
RESPIRATORY: Negative for cough,   
wheezing or shortness of breath.  
CARDIOVASCULAR: Negative for chest   
pain, leg swelling or palpitations.  
GASTROINTESTINAL: Negative for   
abdominal discomfort, blood in stools   
or black  
stools or change in bowel habits  
GENITOURINARY: No history of dysuria,   
frequency or incontinence  
MUSCULOSKELETAL: Negative for (more   
content not included)...                [facility]  
   
                                                    09- History of   
Present illness Narrative               Formatting of this note is different   
from the original.  
ESTABLISHED PATIENT VISIT  
  
Last visit: 24 with Dr. Sepulveda  
  
ASSESSMENT/PLAN:  
1. Hoarseness - ICD9: 784.42, ICD10:   
R49.0 (primary diagnosis)  
2. Dysphagia, unspecified type -   
ICD9: 787.20, ICD10: R13.10  
3. Swallowing disorder - ICD9:   
787.20, ICD10: R13.10  
4. S/P cervical spinal fusion - ICD9:   
V45.4, ICD10: Z98.1  
5. Wheezing [R06.2] - ICD9: 786.07,   
ICD10: R06.2  
6. Episode of change in speech   
[R47.89] - ICD9: 784.59, ICD10:   
R47.89  
  
Patient with dysphagia, cough, change   
in voice; with symptoms appearing to   
have started following C spine fusion   
as above. Since that time, she has   
followed with spine surgeons as well   
as PCP and ENT. Endoscopy   
unremarkable per report. While   
dysphagia can occur as a complication   
following anterior C spine surgery,   
in most cases it is mild and improves   
over time -- pt feels her symptoms   
are worsening. Thus, need to consider   
other possible etiologies. With no   
structural abnormality on ENT   
evaluation, need to consider   
following in ddx:  
  
-Possible posterior fossa stroke with   
patient also reporting history of   
other stroke like symptoms and stroke   
risk factors as above. To further   
evaluate will get MRI brain with MRA   
head and neck.  
-Possible neuromuscular disorder such   
as MG. Could not provoke impairment   
in EOM or ptosis during visit. Does   
report fatigue and worsening function   
in warm temperatures. Will get   
following labs to assess:  
- ACETYLCHOLINE REC BINDING AB  
- ACETYLCHOLINE RECEPTOR MODULATING   
ANTIBODY  
- ACETYLCHOLINE REC BLOCKING AB  
-Patient is on Lisinopril which could   
cause cough and angioedema. Will ask   
PCP if Lisinopril could be held to   
see if any improvement of symptoms.  
-Refer to speech therapy for swallow   
evaluation including imaging studies   
if they feel appropriate (no prior   
MBS).  
-Symptoms could still in fact be   
secondary to surgery, but feel need   
to rule out other possible causes as   
above.  
  
Eugene Sepulveda MD  
  
  
CHIEF COMPLAINT: follow up  
  
HISTORY OF PRESENT ILLNESS: Elmira Warner is a 79 year old female,   
BMI 30.4 kg/m2 with a PMH significant   
for lipidemia, CAD with MI,   
hypertension, asthma, TIA, obesity,   
cervical surgery.  
  
Having trouble swallowing, last seen   
on 24 by Dr. Sepulveda. Started   
after C spine surgery, coughing.   
Worsening over time, concern for   
stroke.Concerning for MG as well.   
Referred to speech therapy for   
swallowing eval. MRI showing multiple   
remote infarcts, MRA head and neck   
with no large vessel occlusion. Had   
recent ECHO. Labs normal.  
  
Patient presents for follow-up   
appointment today. Notes that he   
still having issues with swallowing,   
fluctuates but does occasionally   
choke on hard foods. Does not seem to   
choke on liquids. Did not follow with   
speech therapy because she does not   
feel she needs help with her speech.   
Was unaware they could do swallowing   
evaluation. No other changes since   
previous appointment.  
  
Patient's primary concern today is   
headaches. Notes that for the last   
few weeks has been having regular   
headaches, prior to this she is only   
having about 1 a month. Has   
longstanding history of migraines   
since she was a young teenager. Notes   
that she has not been drinking much   
water, attributes most of the   
headaches secondary to the weather   
changing. Notes that she had a lot   
headaches before surgery on her neck   
but feels they may have worsened   
afterwards. Will occasionally get   
sharp shooting pains from the back to   
the back of the ear on the left side,   
seem to be triggered by more physical   
activity. Notes that she has not had   
1 of these in many days because she   
has not been as physically active   
outside. Also gets more pressure type   
headaches with associated photophobia   
and dizziness. These can last all   
day, typically she takes caffeine and   
goes and lays down which seems to   
break the headache. Does not like to   
take medication for these. Is   
currently on magnesium and co-Q10.   
These pressure-like headaches and to   
be triggered by weather changes as   
well as certain smells. Does have   
significant fatigue associate with   
them as well. Will occasionally get   
zigzags in her vision that last for   
about 10 minutes when these occur.   
Again, have these more frequently the   
last few weeks.  
  
  
REVIEW OF SYSTEMS  
GENERAL:No weight loss, malaise or   
fevers.  
HEENT:Negative for frequent or   
significant headaches, No changes in   
hearing or vision, no nose bleeds or   
other nasal problems  
NECK:Negative for lumps, goiter, pain   
and significant neck swelling  
RESPIRATORY: Negative for cough,   
wheezing or shortness of breath.  
CARDIOVASCULAR: Negative for chest   
pain, leg swelling or palpitations.  
GASTROINTESTINAL: Negative for   
abdominal discomfort, blood in stools   
or black stools or change in bowel   
habits  
GENITOURINARY: No history of dysuria,   
frequency or incontinence  
MUSCULOSKELETAL: Negative for joint   
pain or swelling, back pain or muscle   
pain.  
NEUROLOGIC:Negative for focal   
numbness or weakness, headaches and   
dizziness or syncope, vision changes,   
speech/languag changes - EXCEPT that   
as per HPI above.  
SKIN:Negative for lesions, rash, and   
itching.  
PSYCHIATRIC: Negative for sleep   
disturbance, mood disorder and recent   
psychosocial stressors.  
HEMATOLOGIC/LYMPHATIC/IMMUNOLOGIC:Neg  
ative for prolonged bleeding,   
bruising easily or swollen nodes.  
ENDOCRINE: Negative for cold or heat   
intolerance, polyuria, polydipsia and   
goiter.  
The remainder of the ROS was reviewed   
and is negative.  
  
LAB/IMAGING: Those performed since   
patient's last visit have been   
reviewed.  
MRI brain, MRA head and neck 24  
IMPRESSION:  
  
No acute findings. Chronic changes.   
Multiple remote infarcts.  
  
MRA imaging of the head and neck   
demonstrates no large vessel   
occlusion  
or high-grade stenosis.  
  
MEDICATIONS:  
cetirizine (ZYRTEC) 10 mg tablet Take   
1 tablet by mouth once daily.  
losartan (COZAAR) 50 mg tablet Take 1   
tablet by mouth once daily.  
albuterol (PROVENTIL) 2.5 mg /3 mL   
(0.083 %) nebulizer solution Use 3 mL   
via nebulizer every 6 hours as   
needed.  
nitroglycerin sublingual (NITROSTAT)   
0.4 mg SL tablet Dissolve 1 tablet   
under the tongue every 5 minutes as   
needed for chest pain. Max of 3   
doses. If no relief, call 911.  
folic acid 0.8 mg cap Take 300 mg by   
mouth once daily.  
vit B complex-C-folic ac-zinc 800   
mcg- 12.5 mg tab Take by mouth.  
levalbuterol tartrate HFA (XOPENEX   
HFA) 45 mcg/actuation inhaler Inhale   
2 Puffs as instructed every 6 hours   
as needed.  
vit B complex no.12/niacin,B3,   
(VITAMIN B COMPLEX NO.12-NIACIN ORAL)   
Take by mouth.  
TUMERIC-GING-OLIVE-OREG-CAPRYL ORAL   
Take by mouth.  
Bisacodyl (DULCOLAX) 5 mg tab Use as   
directed for Miralax / Gatorade Bowel   
Prep Kit  
Nebulizers (AERONEB GO NEBULIZER) 1   
Each as directed. Portable Nebulizer   
with tubing, Dx: mild persistent   
asthma  
MULTIVITAMIN (VITAMIN A DAY ORAL)   
Take by mouth.  
Cholecalciferol, Vitamin D3, 25 mcg   
(1,000 unit) cap Take 1,000 Units by   
mouth once daily.  
Ascorbic Acid 1,000 mg tablet Take   
1,000 mg by mouth once daily.  
Aspirin 81 mg Tab Take 162 mg by   
mouth once daily.  
Lactobac no.41/Bifidobact no.7   
(PROBIOTIC-10 ORAL) Take by mouth.   
(Patient not taking: Reported on   
2024)  
  
HISTORIES  
PAST MEDICAL HISTORY  
No date: Abdominal pain, right lower   
quadrant  
No date: Abdominal pain, right upper   
quadrant  
No date: Cerebrovascular disease,   
unspecified  
No date: Coronary atherosclerosis of   
unspecified type of vessel,  
native or graft  
No date: Diverticulosis of colon   
(without mention of hemorrhage)  
No date: Essential hypertension,   
benign  
No date: Intrinsic asthma with status   
asthmaticus  
No date: Mixed hyperlipidemia  
No date: Osteoporosis, unspecified  
No date: Thoracic or lumbosacral   
neuritis or radiculitis, unspecified  
  
FAMILY HISTORY  
Problem Relation Age of Onset  
Stroke Father 52  
heart dx,osteoarthritis  
other (rheumatic fever) Sister  
other (rheumatic fever) Sister  
other (heart disease) Sister  
other (heart disease) Brother  
  
SOCIAL HISTORY  
Social History  
  
Tobacco Use  
Smoking status: Never  
Smokeless tobacco: Never  
Vaping Use  
Vaping status: Never Used  
Substance Use Topics  
Alcohol use: Never  
Drug use: No  
  
PHYSICAL EXAMINATION  
/76   Pulse 82   Resp 18   Wt   
77.8 kg (171 lb 9.6 oz)   SpO2 98%     
BMI 30.40 kg/m  
  
GENERAL EXAM:  
General appearance: NAD, pleasant.  
HEENT: NC/AT, nasal congestion   
absent, no oral lesions, membranes   
moist.  
NECK: No masses, supple.  
Lungs: Breathing comfortably  
Extr: Moves all extremities without   
difficulty  
Skin: Cool to touch. No rash.  
  
NEUROLOGICAL EXAM:  
General: Awake, alert, oriented x3   
(person,place,time), speech fluent,   
no dysarthria; comprehension, naming,   
repetition intact. Short and long   
term memory intact.  
CN: PERRL, EOMI and without   
nystagmus, VFF to confrontation,   
facial sensation and strength are   
normal and symmetric, hearing is   
intact to finger rub bilaterally,   
palate and tongue movements are   
intact and symmetric. SCM and   
trapezius strength normal.  
Motor: Normal tone, bulk and strength   
(5/5) bilaterally (throughout   
extremities x4).  
Reflexes: 1/4 and symmetric  
Coordination: FNF intact. No tremors.  
Sensation: No evidence of neglect.  
Gait: Narrow based and stable with   
normal stride and arm swing.  
  
Assessment and Plan:  
ASSESSMENT/PLAN:  
1. Swallowing disorder - ICD9:   
787.20, ICD10: R13.10 (primary   
diagnosis)  
2. Hoarseness - ICD9: 784.42, ICD10:   
R49.0  
3. Dysphagia, unspecified type -   
ICD9: 787.20, ICD10: R13.10  
4. S/P cervical spinal fusion - ICD9:   
V45.4, ICD10: Z98.1  
5. Episode of change in speech   
[R47.89] - ICD9: 784.59, ICD10:   
R47.89  
6. Wheezing [R06.2] - ICD9: 786.07,   
ICD10: R06.2  
Patient without any change from   
previous, still any difficulty   
swallowing fluids, not liquids. Will   
occasionally choke on food. This   
started after her cervical surgery,   
no changes with this. MRI and MRA of   
the head and neck without any   
etiology for patient's symptoms but   
did show multiple lacunar infarcts.   
MRA of the head and neck without any   
significant intracranial etiology or   
stenosis. Patient did not have speech   
therapy evaluation, she was under the   
impression this was fully for her   
speech and did not know this was for   
swallowing as well. Encouraged her to   
schedule this appointment today.  
  
7. Intractable migraine with aura   
without status migrainosus - ICD9:   
346.01, ICD10: G43.119  
Patient also reporting chronic   
history of migraines since she was a   
teenager, worsening over the last few   
weeks. Prior to that she was only   
getting about 1 a month that were   
well aborted with caffeine and   
sleeping. Notes over the last few   
weeks has been having them multiple   
times a week, they are consistent   
with her previous headaches. States   
that this usually happens when the   
weather changes, also notes   
significant dehydration she is only   
had but 1 or 2 water bottles in the   
last few days. Recent imaging without   
any intracranial etiology for   
patient's headaches. Will order CRP   
and ESR just to ensure no signs of   
inflammation consistent with temporal   
arteritis, but we will suspicion at   
this time. Discussed conservative   
therapy at that may be helpful for   
prevention as patient is deferring   
any prescription medications at this   
time.  
  
8. Lacunar stroke  
Patient found to have multiple   
lacunar strokes on MRI of the brain,   
MRA of the head and neck showing no   
significant intracranial stenosis.   
Patient had recent echo without any   
significant abnormality. Discussed   
risk factors for lacunar strokes and   
other strokes, at length with   
patient. Encouraged her to follow-up   
with primary care for management of   
this. She is already taking a statin   
as well as aspirin daily.  
  
Patient agreeable to treatment of   
care at this time, questions were   
answered. Patient to follow-up   
following testing.  
  
Lizette Browne PA-C  
  
I spent a total of 45 minutes on the   
date of the service which included   
preparing to see the patient,   
face-to-face patient care, completing   
clinical documentation, obtaining   
and/or reviewing separately obtained   
history, performing a medically   
appropriate examination, counseling   
and educating the   
patient/family/caregiver, and   
ordering medications, tests, or   
procedures.  
  
This document has been created with   
the use of voice recognition   
technology. It may contain   
inaccuracies: (e.g. misspellings,   
inaccurate syntax or word sense) that   
have escaped review.  
  
  
Electronically signed by Lizette Browne PA-C at 09/10/2024 3:50 PM   
EDT  
documented in this encounter            [facility]  
   
                                        2024 Instructions   
  
  
Maria E Guzmán APRN.CNP -   
2024 10:38 AM EDTFormatting of   
this note might be different from the   
original.  
Increase your losartan to 50mg daily   
(take 2 of your 25mg pills).  
  
Keep periodically monitoring your   
blood pressure and heart rate. Our   
goal is for you to be 140-130/80-90.  
  
Start taking the cetirizine (Zyrtec)   
every day.  
Electronically signed by Maria E Guzmán APRN.CNP at 2024 10:38   
AM EDT  
  
documented in this encounter            [facility]  
   
                                        2024 Note     HNO ID: [id number]  
Author: MARIA E GUZMÁN APRN.CNP  
Service: ?  
Author Type: Nurse Practitioner  
Type: Progress Notes  
Filed: 2024 14:09  
Note Text:  
Chief Complaint  
Patient presents with:  
BP Check: Puffy under eyes x3 weeks  
HPI  
Elmira Warner is a 79 year old   
female who presents here today for   
Above  
Complaints..  
Per visit with Yanet Shen CNP on   
2024:  
Concerns today..  
BP check --  
Was taken off of lisinopril by Dr. Sepulveda on  d/t concern that cough   
and  
hoarseness were caused by this. Pt   
reports cough and hoarseness have   
completely  
resolved since having lisinopril on   
hold x 2 weeks now.  
Pt has been taking BP at home daily   
and normally BP around 138-145/90s   
without  
medication regimen.  
Pt has been eating out more than   
usual the past few days and has   
noticed BP  
slowly rising based on what she eats.  
Feels she needs to be on something   
for BP to help her heart.  
She denies chest pain, shortness of   
breath, palpitations, dizziness, leg   
edema,  
headaches, or vision changes.  
ASSESSMENT/PLAN:  
1. Essential hypertension - ICD9:   
401.9, ICD10: I10  
- Worsening control  
- Stop/discontinue lisinopril. Switch   
to losartan 25 mg daily.  
- Recommend home blood pressure   
monitoring, to bring results to next   
visit  
- Encouraged sodium restriction, DASH   
or Mediterranean diet  
- Recommend regular aerobic exercise  
- Follow up in 4 weeks for   
hypertension visit  
- LOSARTAN 25 MG TABLET  
UMER Newberry.CNP  
Currently:  
Going through a lot of stress this   
morning, feels this is the reason her   
BP is  
elevated.  
When working outside 150/100. Seems   
to be typically 140's/'s. Has a   
lot  
of work to do at her house, both   
inside and outside. Chest gets warm   
and some  
sweating at nighttime. Denies CP or   
palpitations, no edema or h/a. Today   
right  
neck and down right arm is   
painful-this is after doing edging in   
her yard  
yesterday and is a muscular pain.  
Past medical history, appointments,   
medications, allergies reviewed.  
Previous Medical History  
PAST MEDICAL HISTORY  
No date: Abdominal pain, right lower   
quadrant  
No date: Abdominal pain, right upper   
quadrant  
No date: Cerebrovascular disease,   
unspecified  
No date: Coronary atherosclerosis of   
unspecified type of vessel,  
native or graft  
No date: Diverticulosis of colon   
(without mention of hemorrhage)  
No date: Essential hypertension,   
benign  
No date: Intrinsic asthma with status   
asthmaticus  
No date: Mixed hyperlipidemia  
No date: Osteoporosis, unspecified  
No date: Thoracic or lumbosacral   
neuritis or radiculitis, unspecified  
Previous Surgical History  
PAST SURGICAL HISTORY  
times 3: ANGIOPLASTY  
Comment: 7 stents  
No date: APPENDECTOMY  
12 years ago: COLONOSCOPY FLX DX   
W/COLLJ SPEC WHEN PFRMD  
Comment: Colonoscopy  
2012: COLONOSCOPY FLX DX   
W/COLLJ SPEC WHEN PFRMD  
Comment: Colonoscopy  
No date: HYSTERECTOMY HX  
: PAST SURGICAL HISTORY OF  
Comment: cervical spinal fusion  
Family History  
FAMILY HISTORY  
Problem Relation Age of Onset  
Stroke Father 52  
heart dx,osteoarthritis  
other (rheumatic fever) Sister  
other (rheumatic fever) Sister  
other (heart disease) Sister  
other (heart disease) Brother  
Patient Allergies  
ALLERGIES  
Allergen Reactions  
Adhesive Tape-Silic* Rash  
Demerol [Meperidine* Vomiting  
migraine  
Influenza Vaccine T* Diarrhea  
vomiting/fever  
Iodine Other: See Comments  
sick /headaches  
Penicillins Hives  
throat and mouth swelling  
Shellfish Hives  
Current Medications  
Current Outpatient Medications on   
File Prior to Visit  
Medication Sig  
losartan (COZAAR) 25 mg tablet Take 1   
tablet by mouth once daily.  
albuterol (PROVENTIL) 2.5 mg /3 mL   
(0.083 %) nebulizer solution Use 3 mL   
via  
nebulizer every 6 hours as needed.  
nitroglycerin sublingual (NITROSTAT)   
0.4 mg SL tablet Dissolve 1 tablet   
under  
the tongue every 5 minutes as needed   
for chest pain. Max of 3 doses. If no  
relief, call 911.  
folic acid 0.8 mg cap Take 300 mg by   
mouth once daily.  
vit B complex-C-folic ac-zinc 800   
mcg- 12.5 mg tab Take by mouth.  
levalbuterol tartrate HFA (XOPENEX   
HFA) 45 mcg/actuation inhaler Inhale   
2 Puffs  
as instructed every 6 hours as   
needed.  
vit B complex no.12/niacin,B3,   
(VITAMIN B COMPLEX NO.12-NIACIN ORAL)   
Take by  
mouth.  
TUMERIC-GING-OLIVE-OREG-CAPRYL ORAL   
Take by mouth.  
Bisacodyl (DULCOLAX) 5 mg tab Use as   
directed for Miralax / Gatorade Bowel   
Prep  
Kit  
Nebulizers (AERONEB GO NEBULIZER) 1   
Each as directed. Portable Nebulizer   
with  
tubing, Dx: mild persistent asthma  
MULTIVITAMIN (VITAMIN A DAY ORAL)   
Take by mouth.  
Cholecalciferol, Vitamin D3, 25 mcg   
(1,000 unit) cap Take 1,000 Units by   
mouth  
once daily.  
Ascorbic Acid 1,000 mg tablet Take   
1,000 mg by mouth once daily.  
Aspirin 81 mg Tab Take 162 mg by   
mouth once daily.  
LORazepam (ATIVAN) 0.5 mg Take one   
tablet 20-30 minutes before your MRI   
and  
MRA. (Patient not taking: Reported on   
2024)  
Lactobac no.41/Bifidobact no.7   
(PROBIOTIC-10 ORAL) Take (more   
content not included)...                [facility]  
   
                                                    2024 History of   
Present illness Narrative               Formatting of this note is different   
from the original.  
Chief Complaint  
Patient presents with:  
BP Check: Puffy under eyes x3 weeks  
  
HPI  
Elmira Warner is a 79 year old   
female who presents here today for   
Above Complaints..  
  
Per visit with Yanet Shen CNP on   
2024:  
Concerns today..  
BP check --  
Was taken off of lisinopril by Dr. Sepulveda on  d/t concern that cough   
and hoarseness were caused by this.   
Pt reports cough and hoarseness have   
completely resolved since having   
lisinopril on hold x 2 weeks now.  
Pt has been taking BP at home daily   
and normally BP around 138-145/90s   
without medication regimen.  
Pt has been eating out more than   
usual the past few days and has   
noticed BP slowly rising based on   
what she eats.  
Feels she needs to be on something   
for BP to help her heart.  
She denies chest pain, shortness of   
breath, palpitations, dizziness, leg   
edema, headaches, or vision changes.  
ASSESSMENT/PLAN:  
1. Essential hypertension - ICD9:   
401.9, ICD10: I10  
- Worsening control  
- Stop/discontinue lisinopril. Switch   
to losartan 25 mg daily.  
- Recommend home blood pressure   
monitoring, to bring results to next   
visit  
- Encouraged sodium restriction, DASH   
or Mediterranean diet  
- Recommend regular aerobic exercise  
- Follow up in 4 weeks for   
hypertension visit  
- LOSARTAN 25 MG TABLET  
UMER Newberry.CNP  
  
Currently:  
Going through a lot of stress this   
morning, feels this is the reason her   
BP is elevated.  
  
When working outside 150/100. Seems   
to be typically 140's/'s. Has a   
lot of work to do at her house, both   
inside and outside. Chest gets warm   
and some sweating at nighttime.   
Denies CP or palpitations, no edema   
or h/a. Today right neck and down   
right arm is painful-this is after   
doing edging in her yard yesterday   
and is a muscular pain.  
  
Past medical history, appointments,   
medications, allergies reviewed.  
  
Previous Medical History  
PAST MEDICAL HISTORY  
No date: Abdominal pain, right lower   
quadrant  
No date: Abdominal pain, right upper   
quadrant  
No date: Cerebrovascular disease,   
unspecified  
No date: Coronary atherosclerosis of   
unspecified type of vessel,  
native or graft  
No date: Diverticulosis of colon   
(without mention of hemorrhage)  
No date: Essential hypertension,   
benign  
No date: Intrinsic asthma with status   
asthmaticus  
No date: Mixed hyperlipidemia  
No date: Osteoporosis, unspecified  
No date: Thoracic or lumbosacral   
neuritis or radiculitis, unspecified  
  
Previous Surgical History  
PAST SURGICAL HISTORY  
times 3: ANGIOPLASTY  
Comment: 7 stents  
No date: APPENDECTOMY  
12 years ago: COLONOSCOPY FLX DX   
W/COLLJ SPEC WHEN PFRMD  
Comment: Colonoscopy  
2012: COLONOSCOPY FLX DX   
W/COLLJ SPEC WHEN PFRMD  
Comment: Colonoscopy  
No date: HYSTERECTOMY HX  
: PAST SURGICAL HISTORY OF  
Comment: cervical spinal fusion  
  
Family History  
FAMILY HISTORY  
Problem Relation Age of Onset  
Stroke Father 52  
heart dx,osteoarthritis  
other (rheumatic fever) Sister  
other (rheumatic fever) Sister  
other (heart disease) Sister  
other (heart disease) Brother  
  
Patient Allergies  
ALLERGIES  
Allergen Reactions  
Adhesive Tape-Silic* Rash  
Demerol [Meperidine* Vomiting  
migraine  
Influenza Vaccine T* Diarrhea  
vomiting/fever  
Iodine Other: See Comments  
sick /headaches  
Penicillins Hives  
throat and mouth swelling  
Shellfish Hives  
  
Current Medications  
Current Outpatient Medications on   
File Prior to Visit  
Medication Sig  
losartan (COZAAR) 25 mg tablet Take 1   
tablet by mouth once daily.  
albuterol (PROVENTIL) 2.5 mg /3 mL   
(0.083 %) nebulizer solution Use 3 mL   
via nebulizer every 6 hours as   
needed.  
nitroglycerin sublingual (NITROSTAT)   
0.4 mg SL tablet Dissolve 1 tablet   
under the tongue every 5 minutes as   
needed for chest pain. Max of 3   
doses. If no relief, call 911.  
folic acid 0.8 mg cap Take 300 mg by   
mouth once daily.  
vit B complex-C-folic ac-zinc 800   
mcg- 12.5 mg tab Take by mouth.  
levalbuterol tartrate HFA (XOPENEX   
HFA) 45 mcg/actuation inhaler Inhale   
2 Puffs as instructed every 6 hours   
as needed.  
vit B complex no.12/niacin,B3,   
(VITAMIN B COMPLEX NO.12-NIACIN ORAL)   
Take by mouth.  
TUMERIC-GING-OLIVE-OREG-CAPRYL ORAL   
Take by mouth.  
Bisacodyl (DULCOLAX) 5 mg tab Use as   
directed for Miralax / Gatorade Bowel   
Prep Kit  
Nebulizers (AERONEB GO NEBULIZER) 1   
Each as directed. Portable Nebulizer   
with tubing, Dx: mild persistent   
asthma  
MULTIVITAMIN (VITAMIN A DAY ORAL)   
Take by mouth.  
Cholecalciferol, Vitamin D3, 25 mcg   
(1,000 unit) cap Take 1,000 Units by   
mouth once daily.  
Ascorbic Acid 1,000 mg tablet Take   
1,000 mg by mouth once daily.  
Aspirin 81 mg Tab Take 162 mg by   
mouth once daily.  
LORazepam (ATIVAN) 0.5 mg Take one   
tablet 20-30 minutes before your MRI   
and MRA. (Patient not taking:   
Reported on 2024)  
Lactobac no.41/Bifidobact no.7   
(PROBIOTIC-10 ORAL) Take by mouth.   
(Patient not taking: Reported on   
2024)  
GLUC COLBERT/CHONDRO COLBERT A/VIT C/MN   
(GLUCOSAMINE 1500 COMPLEX ORAL) Take   
by mouth.  
(Patient not taking: Reported on   
2024)  
CALCIUM CARBONATE/VITAMIN D3 (CALCIUM   
+ D ORAL) Take by mouth. (Patient not   
taking: Reported on 2024)  
  
No current facility-administered   
medications on file prior to visit.  
  
Social History  
Social History  
  
Tobacco Use  
Smoking status: Never  
Smokeless tobacco: Never  
Vaping Use  
Vaping Use: Never used  
Substance Use Topics  
Alcohol use: Never  
Drug use: No  
  
Review of Symptoms  
REVIEW OF SYSTEMS  
See HPI, otherwise negative  
  
EXAM:  
/84 (BP Site: Left Arm, BP   
Position: Sitting, BP Cuff Size:   
Regular Adult)   Pulse 73   Resp 20     
Wt 77.7 kg (171 lb 6.4 oz)   SpO2 96%   
  BMI 30.36 kg/m  
  
General Appearance: Well appearing,   
alert, in no acute distress,   
well-hydrated, well nourished..  
Lungs: Lungs clear to auscultation.   
No wheezing, rhonchi, rales..  
Heart: RRR without murmur, gallop, or   
rubs. No ectopy.  
Psychiatric: pleasant, cooperative.  
  
Health Maintenance List  
Spirometry Never done  
BP Controlled (<130/80) Never done  
Pneumococcal Vaccine: 65+(1 of 1 -   
PCV) Never done  
Advance Directive Discussion Never   
done  
DTaP,Tdap,Td Vaccine(1 - Tdap) due on   
2024  
RSV Vaccine(1 - 1-dose 60+ series)   
due on 2024  
Shingrix Vaccine(1 of 2) due on   
2024  
Covid-19 Vaccine(1 - - season)   
due on 2025  
Influenza Vaccine(1) due on   
2024  
LDL Cholesterol due on 2024  
Depression Screening due on   
2025  
Anxiety Screening due on 2025  
Annual PCP Team Chronic Disease Visit   
due on 2025  
Diabetes Screening due on 2027  
Bone Density Screening Completed  
Colorectal Cancer Screening   
Discontinued  
  
Data reviewed  
Previous records, office notes  
  
ASSESSMENT/PLAN:  
1. Essential hypertension - ICD9:   
401.9, ICD10: I10 (primary diagnosis)  
- Uncontrolled  
- Improving control  
Increase losartan from 25mg to 50mg   
daily.  
- Recommend home blood pressure   
monitoring, to bring results to next   
visit  
- Encouraged sodium restriction, DASH   
or Mediterranean diet  
- Recommend regular aerobic exercise  
- LOSARTAN 50 MG TABLET  
  
2. Mild intermittent asthma without   
complication - ICD9: 493.90, ICD10:   
J45.20  
- CETIRIZINE 10 MG TABLET  
  
3. Seasonal allergies - ICD9: 477.9,   
ICD10: J30.2  
- CETIRIZINE 10 MG TABLET  
  
UMER King.CNP  
  
  
  
Electronically signed by Maria E Guzmán APRN.CNP at 2024 2:09   
PM EDT  
documented in this encounter            [facility]  
   
                                                    2024 Telephone   
encounter Note                          Formatting of this note might be   
different from the original.  
TC to pt who voiced understanding and   
appt made to review results.  
  
Jolene Gupta LPN  
  
Electronically signed by Jolene Gupta LPN at 2024 12:58 PM   
EDT  
                                        [facility]  
   
                                                    2024 Miscellaneous   
Notes                                   Formatting of this note might be   
different from the original.  
TC to pt who voiced understanding and   
appt made to review results.  
  
Jolene Gupta LPN  
  
Electronically signed by Jolene Gupta LPN at 2024 12:58 PM   
EDT  
Formatting of this note might be   
different from the original.  
Per rad report, there are not   
findings to suggest a definite   
intracranial cause of symptoms.  
  
Eugene Sepulveda MD  
  
Electronically signed by Eugene Sepulveda Jr., MD at 2024 12:04   
PM EDT  
Formatting of this note might be   
different from the original.  
Patient phoned asking Dr. Sepulveda to   
please review and advise on MRI / MRA   
brain, & MRA carotid results.  
Electronically signed by SHELLIE Guzmán RN at 2024 9:05 AM EDT  
documented in this encounter            [facility]  
   
                                                    2024 Telephone   
encounter Note                          Formatting of this note might be   
different from the original.  
Per rad report, there are not   
findings to suggest a definite   
intracranial cause of symptoms.  
  
Eugene Sepulveda MD  
  
Electronically signed by Eugene Sepulveda Jr., MD at 2024 12:04   
PM EDT  
                                        [facility]  
Work Phone:   
[phone]  
   
                                                    2024 Telephone   
encounter Note                          Formatting of this note might be   
different from the original.  
Patient phoned asking Dr. Sepulveda to   
please review and advise on MRI / MRA   
brain, & MRA carotid results.  
Electronically signed by SHELLIE Guzmán RN at 2024 9:05 AM EDT  
                                        [facility]  
   
                                                    2024 History of   
Present illness Narrative               Formatting of this note might be   
different from the original.  
Radiology Service Progress Note  
  
PATIENT NAME: Elmira Warner  
MRN: [id number]  
  
DATE OF SERVICE: 2024  
TIME: 8:10 AM  
PATIENT IDENTITY VERIFICATION   
COMPLETED USING TWO (2) IDENTIFIERS:   
Name and Date of Birth confirmed by   
patient verbally.  
FALL SCREENING: Has the patient had 2   
falls in the last year or 1 fall with   
injury or currently using an   
Ambulatory Assistive Device (Walker,   
Cane, Wheelchair, Crutches, etc.)? No  
PATIENT GENDER DATA: Female.   
Pregnancy status: Pregnant: No   
Breastfeeding status: NO.  
PATIENT RELEVANT IMPLANT DATA   
REVIEWED: Yes  
PATIENT PRESENTS WITH AN IMPLANTABLE   
OR ATTACHED MEDICAL DEVICE: No  
  
RADIOLOGY DEPARTMENT: MR; Exam(s)   
Completed: Head: Routine Brain  
[place] MRA  
Neck: Carotids MRA, bilateral  
  
PERIPHERAL IV DATA: Not applicable  
  
SIGNED BY: ANNE-MARIE Roberson)  
2024 8:10 AM  
  
  
Electronically signed by Kanika Silvestre RT(R) at 2024 8:11 AM EDT  
documented in this encounter            [facility]  
   
                                        2024 Note     HNO ID: [id number]  
Author: KANIKA SILVESTRE RT (R)  
Service: ?  
Author Type: Technologist  
Type: Progress Notes  
Filed: 2024 08:11  
Note Text:  
Radiology Service Progress Note  
PATIENT NAME: Elmira Warner  
MRN: [id number]  
DATE OF SERVICE: 2024  
TIME: 8:10 AM  
PATIENT IDENTITY VERIFICATION   
COMPLETED USING TWO (2) IDENTIFIERS:   
Name and  
Date of Birth confirmed by patient   
verbally.  
FALL SCREENING: Has the patient had 2   
falls in the last year or 1 fall with  
injury or currently using an   
Ambulatory Assistive Device (Walker,   
Cane,  
Wheelchair, Crutches, etc.)? No  
PATIENT GENDER DATA: Female.   
Pregnancy status: Pregnant: No   
Breastfeeding  
status: NO.  
PATIENT RELEVANT IMPLANT DATA   
REVIEWED: Yes  
PATIENT PRESENTS WITH AN IMPLANTABLE   
OR ATTACHED MEDICAL DEVICE: No  
RADIOLOGY DEPARTMENT: MR; Exam(s)   
Completed: Head: Routine Brain  
[place] MRA  
Neck: Carotids MRA, bilateral  
PERIPHERAL IV DATA: Not applicable  
SIGNED BY: RT Da(R)  
2024 8:10 AM                  [facility]  
   
                                                    2024 Telephone   
encounter Note                          Formatting of this note might be   
different from the original.  
TC to pt who voiced understanding.  
  
Jolene Gupta LPN  
  
Electronically signed by Jolene Gupta LPN at 2024 1:08 PM   
EDT  
                                        [facility]  
   
                                                    2024 Miscellaneous   
Notes                                   Formatting of this note might be   
different from the original.  
TC to pt who voiced understanding.  
  
Jolene Gupta LPN  
  
Electronically signed by Jolene Gupta LPN at 2024 1:08 PM   
EDT  
Formatting of this note might be   
different from the original.  
PDMP website checked and validated.   
All prescriptions have been   
APPROPRIATELY filled. No suspicious   
activity was identified.  
2024 UMER Thurman.BLANE  
  
Electronically signed by Xochilt Azul APRN.CNP at 2024 12:06   
PM EDT  
Formatting of this note might be   
different from the original.  
Patient calling she is scheduled for   
MRI on 2024. She is   
claustrophobic and is asking for a   
pre medication to be able to do the   
MRI. Patient uses [facility]   
for her pharmacy. Patient asking   
about the MRA if it is going to be   
tight like the MRI is? She would need   
pre medication for that also. She is   
scheduled for that on 2024.  
Please advise  
Electronically signed by Sepideh Parr LPN at 2024 11:09 AM EDT  
documented in this encounter            [facility]  
   
                                                    2024 Telephone   
encounter Note                          Formatting of this note might be   
different from the original.  
PDMP website checked and validated.   
All prescriptions have been   
APPROPRIATELY filled. No suspicious   
activity was identified.  
2024 UMER Thurman.BLANE  
  
Electronically signed by Xochilt Azul APRN.CNP at 2024 12:06   
PM EDT  
                                        [facility]  
   
                                                    2024 Telephone   
encounter Note                          Formatting of this note might be   
different from the original.  
Patient calling she is scheduled for   
MRI on 2024. She is   
claustrophobic and is asking for a   
pre medication to be able to do the   
MRI. Patient uses [facility]   
for her pharmacy. Patient asking   
about the MRA if it is going to be   
tight like the MRI is? She would need   
pre medication for that also. She is   
scheduled for that on 2024.  
Please advise  
Electronically signed by Sepideh Parr LPN at 2024 11:09 AM EDT  
                                        [facility]  
   
                                        2024 Note     HNO ID: [id number]  
Author: YANET SHEN APRN.CNP  
Service: ?  
Author Type: Nurse Practitioner  
Type: Progress Notes  
Filed: 2024 10:44  
Note Text:  
Chief Complaint  
Patient presents with:  
BP Check  
HPI  
Elmira Warner is a 79 year old   
female who presents here today for   
Above  
Complaints.  
Elmira is an established patient of   
Dr. Michael DO and myself.  
Concerns today..  
BP check --  
Was taken off of lisinopril by Dr. Sepulveda on  d/t concern that cough   
and  
hoarseness were caused by this. Pt   
reports cough and hoarseness have   
completely  
resolved since having lisinopril on   
hold x 2 weeks now.  
Pt has been taking BP at home daily   
and normally BP around 138-145/90s   
without  
medication regimen.  
Pt has been eating out more than   
usual the past few days and has   
noticed BP  
slowly rising based on what she eats.  
Feels she needs to be on something   
for BP to help her heart.  
She denies chest pain, shortness of   
breath, palpitations, dizziness, leg   
edema,  
headaches, or vision changes.  
Last 14 Encounter BP Readings:  
Date: BP:  
2024 158/80  
2024 138/82  
2024 150/70  
2024 152/94  
2023 122/70  
2022 150/90  
2022 130/86  
2022 160/100  
2022 150/80  
2022 165/80  
2022 162/104  
2022 134/62  
04/15/2022 118/62  
2022 140/90  
Past medical history, appointments,   
medications, allergies reviewed.  
Previous Medical History  
PAST MEDICAL HISTORY  
No date: Abdominal pain, right lower   
quadrant  
No date: Abdominal pain, right upper   
quadrant  
No date: Cerebrovascular disease,   
unspecified  
No date: Coronary atherosclerosis of   
unspecified type of vessel,  
native or graft  
No date: Diverticulosis of colon   
(without mention of hemorrhage)  
No date: Essential hypertension,   
benign  
No date: Intrinsic asthma with status   
asthmaticus  
No date: Mixed hyperlipidemia  
No date: Osteoporosis, unspecified  
No date: Thoracic or lumbosacral   
neuritis or radiculitis, unspecified  
Previous Surgical History  
PAST SURGICAL HISTORY  
times 3: ANGIOPLASTY  
Comment: 7 stents  
No date: APPENDECTOMY  
12 years ago: COLONOSCOPY FLX DX   
W/COLLJ SPEC WHEN PFRMD  
Comment: Colonoscopy  
2012: COLONOSCOPY FLX DX   
W/COLLJ SPEC WHEN PFRMD  
Comment: Colonoscopy  
No date: HYSTERECTOMY HX  
: PAST SURGICAL HISTORY OF  
Comment: cervical spinal fusion  
Family History  
FAMILY HISTORY  
Problem Relation Age of Onset  
Stroke Father 52  
heart dx,osteoarthritis  
other (rheumatic fever) Sister  
other (rheumatic fever) Sister  
other (heart disease) Sister  
other (heart disease) Brother  
Patient Allergies  
ALLERGIES  
Allergen Reactions  
Adhesive Tape-Silic* Rash  
Demerol [Meperidine* Vomiting  
migraine  
Influenza Vaccine T* Diarrhea  
vomiting/fever  
Iodine Other: See Comments  
sick /headaches  
Penicillins Hives  
throat and mouth swelling  
Shellfish Hives  
Current Medications  
Current Outpatient Medications on   
File Prior to Visit  
Medication Sig  
albuterol (PROVENTIL) 2.5 mg /3 mL   
(0.083 %) nebulizer solution Use 3 mL   
via  
nebulizer every 6 hours as needed.  
nitroglycerin sublingual (NITROSTAT)   
0.4 mg SL tablet Dissolve 1 tablet   
under  
the tongue every 5 minutes as needed   
for chest pain. Max of 3 doses. If no  
relief, call 911.  
folic acid 0.8 mg cap Take 300 mg by   
mouth once daily.  
vit B complex-C-folic ac-zinc 800   
mcg- 12.5 mg tab Take by mouth.  
levalbuterol tartrate HFA (XOPENEX   
HFA) 45 mcg/actuation inhaler Inhale   
2 Puffs  
as instructed every 6 hours as   
needed.  
vit B complex no.12/niacin,B3,   
(VITAMIN B COMPLEX NO.12-NIACIN ORAL)   
Take by  
mouth.  
TUMERIC-GING-OLIVE-OREG-CAPRYL ORAL   
Take by mouth.  
Nebulizers (AERONEB GO NEBULIZER) 1   
Each as directed. Portable Nebulizer   
with  
tubing, Dx: mild persistent asthma  
MULTIVITAMIN (VITAMIN A DAY ORAL)   
Take by mouth.  
Cholecalciferol, Vitamin D3, 25 mcg   
(1,000 unit) cap Take 1,000 Units by   
mouth  
once daily.  
Ascorbic Acid 1,000 mg tablet Take   
1,000 mg by mouth once daily.  
Aspirin 81 mg Tab Take 162 mg by   
mouth once daily.  
lisinopril (ZESTRIL) 5 mg tablet Take   
1 tablet by mouth once daily.   
(Patient  
not taking: Reported on 2024)  
Bisacodyl (DULCOLAX) 5 mg tab Use as   
directed for Miralax / Gatorade Bowel   
Prep  
Kit  
Lactobac no.41/Bifidobact no.7   
(PROBIOTIC-10 ORAL) Take by mouth.   
(Patient not  
taking: Reported on 2024)  
GLUC COLBERT/CHONDRO COLBERT A/VIT C/MN   
(GLUCOSAMINE 1500 COMPLEX ORAL) Take   
by mouth.  
(Patient not taking: Reported on   
2024)  
CALCIUM CARBONATE/VITAMIN D3 (CALCIUM   
+ D ORAL) Take by mouth. (Patient not  
taking: Reported on 2024)  
No current facility-administered   
medications on file prior to visit.  
Social History  
Social History  
Tobacco Use  
Smoking status: Never  
Smokeless tobacco: Never  
Vaping Use  
Vaping Use: Never used  
Substance Use Topics  
Alcohol use: Never  
Drug use: No  
REVIEW OF SYSTEMS: as above  
Reviewed relevant PMHx, PSHx, Social   
Hx, current medications and   
allergies. (more content not   
included)...                            [facility]  
   
                                                    2024 History of   
Present illness Narrative               Formatting of this note is different   
from the original.  
Chief Complaint  
Patient presents with:  
BP Check  
  
HPI  
Elmira Warner is a 79 year old   
female who presents here today for   
Above Complaints.  
  
Elmira is an established patient of   
Dr. Michael DO and myself.  
  
Concerns today..  
  
BP check --  
Was taken off of lisinopril by Dr. Sepulveda on  d/t concern that cough   
and hoarseness were caused by this.   
Pt reports cough and hoarseness have   
completely resolved since having   
lisinopril on hold x 2 weeks now.  
Pt has been taking BP at home daily   
and normally BP around 138-145/90s   
without medication regimen.  
Pt has been eating out more than   
usual the past few days and has   
noticed BP slowly rising based on   
what she eats.  
Feels she needs to be on something   
for BP to help her heart.  
She denies chest pain, shortness of   
breath, palpitations, dizziness, leg   
edema, headaches, or vision changes.  
  
Last 14 Encounter BP Readings:  
Date: BP:  
2024 158/80  
2024 138/82  
2024 150/70  
2024 152/94  
2023 122/70  
2022 150/90  
2022 130/86  
2022 160/100  
2022 150/80  
2022 165/80  
2022 162/104  
2022 134/62  
04/15/2022 118/62  
2022 140/90\  
  
Past medical history, appointments,   
medications, allergies reviewed.  
  
Previous Medical History  
PAST MEDICAL HISTORY  
No date: Abdominal pain, right lower   
quadrant  
No date: Abdominal pain, right upper   
quadrant  
No date: Cerebrovascular disease,   
unspecified  
No date: Coronary atherosclerosis of   
unspecified type of vessel,  
native or graft  
No date: Diverticulosis of colon   
(without mention of hemorrhage)  
No date: Essential hypertension,   
benign  
No date: Intrinsic asthma with status   
asthmaticus  
No date: Mixed hyperlipidemia  
No date: Osteoporosis, unspecified  
No date: Thoracic or lumbosacral   
neuritis or radiculitis, unspecified  
  
Previous Surgical History  
PAST SURGICAL HISTORY  
times 3: ANGIOPLASTY  
Comment: 7 stents  
No date: APPENDECTOMY  
12 years ago: COLONOSCOPY FLX DX   
W/COLLJ SPEC WHEN PFRMD  
Comment: Colonoscopy  
2012: COLONOSCOPY FLX DX   
W/COLLJ SPEC WHEN PFRMD  
Comment: Colonoscopy  
No date: HYSTERECTOMY HX  
: PAST SURGICAL HISTORY OF  
Comment: cervical spinal fusion  
  
Family History  
FAMILY HISTORY  
Problem Relation Age of Onset  
Stroke Father 52  
heart dx,osteoarthritis  
other (rheumatic fever) Sister  
other (rheumatic fever) Sister  
other (heart disease) Sister  
other (heart disease) Brother  
  
Patient Allergies  
ALLERGIES  
Allergen Reactions  
Adhesive Tape-Silic* Rash  
Demerol [Meperidine* Vomiting  
migraine  
Influenza Vaccine T* Diarrhea  
vomiting/fever  
Iodine Other: See Comments  
sick /headaches  
Penicillins Hives  
throat and mouth swelling  
Shellfish Hives  
  
Current Medications  
Current Outpatient Medications on   
File Prior to Visit  
Medication Sig  
albuterol (PROVENTIL) 2.5 mg /3 mL   
(0.083 %) nebulizer solution Use 3 mL   
via nebulizer every 6 hours as   
needed.  
nitroglycerin sublingual (NITROSTAT)   
0.4 mg SL tablet Dissolve 1 tablet   
under the tongue every 5 minutes as   
needed for chest pain. Max of 3   
doses. If no relief, call 911.  
folic acid 0.8 mg cap Take 300 mg by   
mouth once daily.  
vit B complex-C-folic ac-zinc 800   
mcg- 12.5 mg tab Take by mouth.  
levalbuterol tartrate HFA (XOPENEX   
HFA) 45 mcg/actuation inhaler Inhale   
2 Puffs as instructed every 6 hours   
as needed.  
vit B complex no.12/niacin,B3,   
(VITAMIN B COMPLEX NO.12-NIACIN ORAL)   
Take by mouth.  
TUMERIC-GING-OLIVE-OREG-CAPRYL ORAL   
Take by mouth.  
Nebulizers ([employer] GO NEBULIZER) 1   
Each as directed. Portable Nebulizer   
with tubing, Dx: mild persistent   
asthma  
MULTIVITAMIN (VITAMIN A DAY ORAL)   
Take by mouth.  
Cholecalciferol, Vitamin D3, 25 mcg   
(1,000 unit) cap Take 1,000 Units by   
mouth once daily.  
Ascorbic Acid 1,000 mg tablet Take   
1,000 mg by mouth once daily.  
Aspirin 81 mg Tab Take 162 mg by   
mouth once daily.  
lisinopril (ZESTRIL) 5 mg tablet Take   
1 tablet by mouth once daily.   
(Patient not taking: Reported on   
2024)  
Bisacodyl (DULCOLAX) 5 mg tab Use as   
directed for Miralax / Gatorade Bowel   
Prep Kit  
Lactobac no.41/Bifidobact no.7   
(PROBIOTIC-10 ORAL) Take by mouth.   
(Patient not taking: Reported on   
2024)  
GLUC COLBERT/CHONDRO COLBERT A/VIT C/MN   
(GLUCOSAMINE 1500 COMPLEX ORAL) Take   
by mouth.  
(Patient not taking: Reported on   
2024)  
CALCIUM CARBONATE/VITAMIN D3 (CALCIUM   
+ D ORAL) Take by mouth. (Patient not   
taking: Reported on 2024)  
  
No current facility-administered   
medications on file prior to visit.  
  
Social History  
Social History  
  
Tobacco Use  
Smoking status: Never  
Smokeless tobacco: Never  
Vaping Use  
Vaping Use: Never used  
Substance Use Topics  
Alcohol use: Never  
Drug use: No  
  
REVIEW OF SYSTEMS: as above  
  
Reviewed relevant PMHx, PSHx, Social   
Hx, current medications and   
allergies.  
  
Review of Symptoms  
REVIEW OF SYSTEMS  
See HPI.  
  
EXAM:  
/80 (BP Site: Left Arm, BP   
Position: Sitting, BP Cuff Size:   
Large Adult)   Pulse 72   Resp 14     
Wt 77.2 kg (170 lb 3.2 oz)   SpO2 95%   
  BMI 30.15 kg/m  
  
General Appearance: Well appearing,   
alert, in no acute distress,   
well-hydrated, well nourished..  
Skin: Skin color, texture, turgor   
normal, no suspicious rashes or   
lesions.  
Head: Normocephalic, no masses,   
lesions, tenderness or abnormalities.  
Lungs: Lungs clear to auscultation.   
No wheezing, rhonchi, rales..  
Heart: RRR without murmur, gallop, or   
rubs. No ectopy.  
  
Health Maintenance List  
Spirometry Never done  
BP Controlled (<130/80) Never done  
Pneumococcal Vaccine: 65+(1 of 1 -   
PCV) Never done  
Advance Directive Discussion Never   
done  
DTaP,Tdap,Td Vaccine(1 - Tdap) due on   
2024  
RSV Vaccine(1 - 1-dose 60+ series)   
due on 2024  
Shingrix Vaccine(1 of 2) due on   
2024  
Covid-19 Vaccine(1 - - season)   
due on 2025  
Influenza Vaccine(1) due on   
2024  
LDL Cholesterol due on 2024  
Annual PCP Team Chronic Disease Visit   
due on 2025  
Depression Screening due on   
2025  
Anxiety Screening due on 2025  
Diabetes Screening due on 2027  
Bone Density Screening Completed  
Colorectal Cancer Screening   
Discontinued  
  
ASSESSMENT/PLAN:  
1. Essential hypertension - ICD9:   
401.9, ICD10: I10  
- Worsening control  
- Stop/discontinue lisinopril. Switch   
to losartan 25 mg daily.  
- Recommend home blood pressure   
monitoring, to bring results to next   
visit  
- Encouraged sodium restriction, DASH   
or Mediterranean diet  
- Recommend regular aerobic exercise  
- Follow up in 4 weeks for   
hypertension visit  
- LOSARTAN 25 MG TABLET  
  
RTO in 4 weeks, sooner if needed.  
  
Prescription instructions reviewed   
with patient as applicable. Potential   
red flag symptoms discussed with the   
patient. Reviewed appropriate action   
plan to take if red flag symptoms   
occur. Patient agreeable to treatment   
plan.  
  
UMER Newberry.CNP  
[address] [place] [address]  
Phone: [phone]  
Fax: [phone]  
Electronically signed by Yanet Shen APRN.CNP at 2024 10:44   
AM EDT  
documented in this encounter            [facility]  
   
                                                    2024 Telephone   
encounter Note                          Formatting of this note might be   
different from the original.  
Pt. informed symptoms have gotten   
better. She will discuss at   
Appointment.  
Electronically signed by   
Janny Cristina LPN at   
2024 2:06 PM EDT  
                                        [facility]  
Work Phone:   
[phone]  
   
                                                    2024 Miscellaneous   
Notes                                   Formatting of this note might be   
different from the original.  
Pt. informed symptoms have gotten   
better. She will discuss at   
Appointment.  
Electronically signed by   
Janny Cristina LPN at   
2024 2:06 PM EDT  
Formatting of this note might be   
different from the original.  
Please see if holding lisinopril has   
improved any of her symptoms of   
dysphagia, hoarseness, or swallowing   
issues. If not, I would recommend   
restarting it and continuing to   
monitor BP closely.  
Still needs appointment once returns   
from vacation.  
  
Thank you,  
UMER Saha.CNP  
  
Electronically signed by Yanet Shen APRN.CNP at 2024 12:16   
PM EDT  
Formatting of this note might be   
different from the original.  
Lisinopril was being held due to Dr. Sepulveda suggestions about hoarseness   
being possibly related to delayed   
reaction to lisinopril.  
  
Jeni Akers, RN  
  
Electronically signed by Jeni Akers, RN at 2024 11:24 AM   
EDT  
Formatting of this note might be   
different from the original.  
Why was lisinopril being held? I   
don't see any documentation of this.  
  
Also, please schedule her an   
appointment once she returns from   
vacation to assess BP.  
  
Thank you,  
UMER Saha.BLANE  
  
Electronically signed by Yanet Shen APRN.CNP at 2024 10:56   
AM EDT  
Formatting of this note might be   
different from the original.  
Patient calls and states that she has   
been holding lisinopril since   
07/15/2024. Patient reports that her   
blood pressure today is 162/88, pulse   
84. Patient is currently on vacation.   
Patient is not having any symptoms.   
Patient states that she was supposed   
to let provider know if blood   
pressure is elevated since lisinopril   
has been on hold.  
  
Please review and advise,  
Jeni Akers RN  
  
Electronically signed by Jeni Akers RN at 2024 8:44 AM   
EDT  
documented in this encounter            [facility]  
   
                                                    2024 Telephone   
encounter Note                          Formatting of this note might be   
different from the original.  
Please see if holding lisinopril has   
improved any of her symptoms of   
dysphagia, hoarseness, or swallowing   
issues. If not, I would recommend   
restarting it and continuing to   
monitor BP closely.  
Still needs appointment once returns   
from vacation.  
  
Thank you,  
Yanet Shen APRN.CNP  
  
Electronically signed by Yanet Shen APRN.CNP at 2024 12:16   
PM EDT  
                                        [facility]  
   
                                                    2024 Telephone   
encounter Note                          Formatting of this note might be   
different from the original.  
Lisinopril was being held due to Dr. Sepulveda suggestions about hoarseness   
being possibly related to delayed   
reaction to lisinopril.  
  
Jeni Akers RN  
  
Electronically signed by Jeni Akers RN at 2024 11:24 AM   
EDT  
                                        [facility]  
   
                                                    2024 Telephone   
encounter Note                          Formatting of this note might be   
different from the original.  
Why was lisinopril being held? I   
don't see any documentation of this.  
  
Also, please schedule her an   
appointment once she returns from   
vacation to assess BP.  
  
Thank you,  
BRANDY SahaCNP  
  
Electronically signed by Yanet Shen APRN.CNP at 2024 10:56   
AM EDT  
                                        [facility]  
   
                                                    2024 Telephone   
encounter Note                          Formatting of this note might be   
different from the original.  
Patient calls and states that she has   
been holding lisinopril since   
07/15/2024. Patient reports that her   
blood pressure today is 162/88, pulse   
84. Patient is currently on vacation.   
Patient is not having any symptoms.   
Patient states that she was supposed   
to let provider know if blood   
pressure is elevated since lisinopril   
has been on hold.  
  
Please review and advise,  
Jeni Akers RN  
  
Electronically signed by Jeni Akers RN at 2024 8:44 AM   
EDT  
                                        [facility]  
   
                                                    07- Telephone   
encounter Note                          Formatting of this note might be   
different from the original.  
Spoke with pt gave information   
provided. Pt voices understanding.  
Electronically signed by Dianna Yu LPN at 07/15/2024 4:42 PM EDT  
                                        [facility]  
   
                                                    07- Miscellaneous   
Notes                                   Formatting of this note might be   
different from the original.  
Spoke with pt gave information   
provided. Pt voices understanding.  
Electronically signed by Dianna Yu LPN at 07/15/2024 4:42 PM EDT  
Formatting of this note might be   
different from the original.  
Phoned patient left message to return   
call and ask to speak to a nurse.  
Electronically signed by Sepideh Parr LPN at 07/15/2024 9:29 AM EDT  
Formatting of this note might be   
different from the original.  
Please inform patient to hold the   
lisinopril 2.5 mg a day tablet that   
she is taking. The Neurologist Dr. Sepulveda is concerned that her   
hoarseness is possibly related to a   
delayed reaction to this medication.   
Continue to monitor her BLOOD   
PRESSURE and goals should be <140/90  
  
Bebeto Rodriguez DO  
  
  
Electronically signed by Bebeto Rodriguez DO at 07/15/2024 6:45 AM   
EDT  
Formatting of this note might be   
different from the original.  
Images from the original note were   
not included.  
Message  
Received: Today  
Eugene Sepulveda Jr., MD P tr Neur   
[facility] Nurse  
Please let Dr. Cleveland office know   
that I would recommend following:  
  
If no contraindication would hold pts   
Lisinopril to see if symptoms   
improve. Could be a delayed reaction   
to ACE-I. Lots of wheezing in   
addition to swallowing complaints.  
  
  
Electronically signed by Laura Kowalski LPN at 2024 6:42 PM   
EDT  
documented in this encounter            [facility]  
   
                                                    07- Telephone   
encounter Note                          Formatting of this note is different   
from the original.  
The patient has been identified by   
name and date of birth: Yes  
  
Caregiver verified no other   
encounters exist for this   
prescription request: Yes  
  
Caregiver confirmed with   
patient/requestor that no other   
refills are due, in the near future,   
with this provider at this time: Yes  
  
The last office visit in the   
department: 2024  
  
Does the patient have a future office   
visit with this provider/department:   
Yes Visit date not found  
  
Requested Prescriptions  
  
Pending Prescriptions Disp Refills  
albuterol (PROVENTIL) 2.5 mg /3 mL   
(0.083 %) nebulizer solution 30 mL 0  
Sig: Use 3 mL via nebulizer every 6   
hours as needed.  
nitroglycerin sublingual (NITROSTAT)   
0.4 mg SL tablet  
Sig: Dissolve 1 tablet under the   
tongue every 5 minutes as needed for   
chest pain. Max of 3 doses. If no   
relief, call 911.  
  
Janeth Costello RN  
July 15, 2024 12:01 PM  
  
  
Electronically signed by Janeth Costello RN at 07/15/2024 12:01 PM   
EDT  
                                        [facility]  
   
                                                    07- Miscellaneous   
Notes                                   Formatting of this note is different   
from the original.  
The patient has been identified by   
name and date of birth: Yes  
  
Caregiver verified no other   
encounters exist for this   
prescription request: Yes  
  
Caregiver confirmed with   
patient/requestor that no other   
refills are due, in the near future,   
with this provider at this time: Yes  
  
The last office visit in the   
department: 2024  
  
Does the patient have a future office   
visit with this provider/department:   
Yes Visit date not found  
  
Requested Prescriptions  
  
Pending Prescriptions Disp Refills  
albuterol (PROVENTIL) 2.5 mg /3 mL   
(0.083 %) nebulizer solution 30 mL 0  
Sig: Use 3 mL via nebulizer every 6   
hours as needed.  
nitroglycerin sublingual (NITROSTAT)   
0.4 mg SL tablet  
Sig: Dissolve 1 tablet under the   
tongue every 5 minutes as needed for   
chest pain. Max of 3 doses. If no   
relief, call 911.  
  
Janeth Costello RN  
July 15, 2024 12:01 PM  
  
  
Electronically signed by Janeth Costello RN at 07/15/2024 12:01 PM   
EDT  
documented in this encounter            [facility]  
   
                                                    07- Telephone   
encounter Note                          Formatting of this note might be   
different from the original.  
Phoned patient left message to return   
call and ask to speak to a nurse.  
Electronically signed by Sepideh Parr LPN at 07/15/2024 9:29 AM EDT  
                                        [facility]  
   
                                                    07- Telephone   
encounter Note                          Formatting of this note might be   
different from the original.  
Please inform patient to hold the   
lisinopril 2.5 mg a day tablet that   
she is taking. The Neurologist Dr. Sepulveda is concerned that her   
hoarseness is possibly related to a   
delayed reaction to this medication.   
Continue to monitor her BLOOD   
PRESSURE and goals should be <140/90  
  
Bebeto Rodriguez DO  
  
  
Electronically signed by Bebeto Rodriguez DO at 07/15/2024 6:45 AM   
EDT  
                                        [facility]  
   
                                                    2024 Telephone   
encounter Note                          Formatting of this note might be   
different from the original.  
Images from the original note were   
not included.  
Message  
Received: Today  
Eugene Sepulveda Jr., MD P tr Neur   
[facility] Nurse  
Please let Dr. Cleveland office know   
that I would recommend following:  
  
If no contraindication would hold pts   
Lisinopril to see if symptoms   
improve. Could be a delayed reaction   
to ACE-I. Lots of wheezing in   
addition to swallowing complaints.  
  
  
Electronically signed by Laura Kowalski LPN at 2024 6:42 PM   
EDT  
                                        [facility]  
   
                                        2024 Note     HNO ID: [id number]  
Author: EUGENE SEPULVEDA JR, MD  
Service: ?  
Author Type: Physician  
Type: Progress Notes  
Filed: 2024 11:57  
Note Text:  
NEW PATIENT (CONSULT) HISTORY AND   
PHYSICAL EXAM  
PRIMARY CARE PHYSICIAN: Bebeto Rodriguez DO  
REASON FOR CONSULT: See below  
REFERRING PHYSICIAN: Bebeto Rodriguez DO  
CHIEF COMPLAINT: Troubles swallowing  
Consultation requested by Bebeto Rodriguez DO for an opinion regarding   
chief  
complaint of  
Patient presents with:  
New Patient: New patient consult PCP,   
reported Hx spinal surgery neck   
region  
, c/o difficulty swallowing,   
change in voice.  
and my final recommendations will be   
communicated back to the requesting  
physician by way of shared medical   
record or letter via [place] mail.  
HISTORY OF PRESENT ILLNESS: Elmira Warner is a 79 year old female,   
BMI  
29.83 kg/m2 with a PMH significant   
for C spine surgery in  -   
involving 3  
disc per patient; and history as   
below. Referred for dysphagia,   
swallowing  
issues, hoarseness. Per PCP note of   
24:  
Hoarseness, coordination of   
swallowing issues. Has had   
chronically. Has been  
seen by ENT Dr. Kumari whom   
recommended that she sees Neurologist   
for additional  
testing Per ENT note under scanned   
documents appears that endoscopy was  
essentially normal.  
Pt states symptom started s/p C spine   
surgery and nothing before. Patient   
feels  
symptoms are worsening and has   
swelling and feels like choking all   
the time.  
Will start coughing for no reason at   
all. Only symptoms prior to surgery   
was  
loss of muscle strength in the upper   
extremities. On lisinopril but pt   
states  
has been on since  with no   
issues. [facility] records suggest since   
.  
Never had prior swallowing studies.  
When asked about vision, states has   
problems, where she gets zig zag   
patterns  
but has history of migraines and   
chronic. Never sees double or   
diplopia.  
Denies ptosis initially but then   
later states that the L eye appears   
to droop  
sometimes. Patient does have wheezing   
during interview but states chronic   
and  
blames on ASA. Voice becomes more   
hoarse as the day goes on but not   
always.  
Unclear if temperature influence on   
symptoms. States overall easily   
drained in  
heat.  
Patient states that was not aware of   
symptoms until 3 weeks after surgery.  
States can choke on a pill, a piece   
of meat, a carrot... however, does   
not  
recall choking on liquids.  
States spine surgery is who referred   
patient to ENT at time of 1 year   
follow up.  
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   
stenosis.  
States has had multiple episodes   
since surgery where she could not get   
words out  
or pronounce words. Stroke risk   
factors of CAD, HTN, Elevated LDL.  
TSH  
Date Value Ref Range Status  
2024 2.540 0.270 - 4.200 mIU/L   
Final  
REVIEW OF SYSTEMS  
GENERAL:No weight loss, malaise or   
fevers.  
HEENT:Negative for frequent or   
significant headaches, No changes in   
hearing or  
vision, no nose bleeds or other nasal   
problems  
NECK:Chronic neck pain.  
RESPIRATORY: Positive for cough,   
wheezing and shortness of breath when   
tired or  
with exertion.  
CARDIOVASCULAR: Negative for chest   
pain, or palpitations.  
GASTROINTESTINAL: Negative for   
abdominal discomfort, blood in stools   
or black  
stools or change in bowel habits  
GENITOURINARY: No history of dysuria,   
frequency or incontinence  
MUSCULOSKELETAL: Negative for joint   
pain or swelling, back pain or muscle   
pain.  
NEUROLOGIC:Negative for focal   
numbness or weakness, headaches and   
dizziness or  
syncope, vision changes,   
speech/language changes, changes in   
gait or falls --  
besides those complaints as above in   
HPI.  
SKIN:Negative for lesions, rash, and   
itching.  
HEMATOLOGIC/LYMPHATIC/IMMUNOLOGIC:Neg  
ative for prolonged bleeding,   
bruising  
easily or swollen nodes.  
ENDOCRINE: Negative for cold or heat   
intolerance, polyuria, polydipsia and  
goiter.  
The remainder of the ROS was reviewed   
and is negative.  
LAB/IMAGING:  
Reviewed and include:  
WBC (k/uL)  
Date Value  
2023 8.27  
RBC (m/uL)  
Date Value  
2023 4.23  
Hemoglobin (g/dL)  
Date Value  
2023 12.3  
Hematocrit (%)  
Date Value  
2023 38.6  
MCV (fL)  
Date Value  
2023 91.3  
MCH (pg)  
Date Value  
2023 29.1  
MCHC (g/dL)  
Date Value  
2023 31.9  
RDW-CV (%)  
Date Value  
2023 13.5  
Platelet Count (k/uL)  
Date Value  
2023 229  
MPV (fL)  
Date Value  
2023 11.8  
Glucose (mg/dL)  
Date Value  
2024 95  
BUN (mg/dL)  
Date Value  
2024 15  
Creatinine (mg/dL)  
Date Value  
2024 1.01 (H)  
Sodium (mmol/L)  
Date Value  
2024 137  
Potassium (mmol/L)  
Date Value  
2024 4.1  
Chloride (mmol/L)  
Date Value  
2024 103  
CO2 (mmol/L)  
Date Value  
2024 25  
Protein, Total (g/dL)  
Date Value  
2024 8.0  
Albumin (g/dL)  
Date Value  
2024 4.2  
Calcium, Total (mg/dL)  
Date Value  
2024 10.2  
Alkaline Phosphatase (U/L)  
Date Value  
2024 91  
Bilirubin, Total (mg/dL)  
Date Value  
2024 (more content not   
included)...                            [facility]  
   
                                                    07- History of   
Present illness Narrative               Formatting of this note is different   
from the original.  
NEW PATIENT (CONSULT) HISTORY AND   
PHYSICAL EXAM  
  
PRIMARY CARE PHYSICIAN: Bebeto Rodriguez DO  
REASON FOR CONSULT: See below  
REFERRING PHYSICIAN: Bebeto Rodriguez DO  
  
CHIEF COMPLAINT: Troubles swallowing  
  
Consultation requested by Bebeto Rodriguez DO for an opinion regarding   
chief complaint of  
Patient presents with:  
New Patient: New patient consult PCP,   
reported Hx spinal surgery neck   
region , c/o difficulty   
swallowing, change in voice.  
and my final recommendations will be   
communicated back to the requesting   
physician by way of shared medical   
record or letter via [place] mail.  
  
HISTORY OF PRESENT ILLNESS: Elmira Warner is a 79 year old female,   
BMI 29.83 kg/m2 with a PMH   
significant for C spine surgery in   
 - involving 3 disc per patient;   
and history as below. Referred for   
dysphagia, swallowing issues,   
hoarseness. Per PCP note of 24:  
  
Hoarseness, coordination of   
swallowing issues. Has had   
chronically. Has been seen by ENT Dr. Kumari whom recommended that she sees   
Neurologist for additional testing   
Per ENT note under scanned documents   
appears that endoscopy was   
essentially normal.  
  
Pt states symptom started s/p C spine   
surgery and nothing before. Patient   
feels symptoms are worsening and has   
swelling and feels like choking all   
the time. Will start coughing for no   
reason at all. Only symptoms prior to   
surgery was loss of muscle strength   
in the upper extremities. On   
lisinopril but pt states has been on   
since  with no issues. [facility]   
records suggest since .  
  
Never had prior swallowing studies.  
  
When asked about vision, states has   
problems, where she gets zig zag   
patterns but has history of migraines   
and chronic. Never sees double or   
diplopia. Denies ptosis initially but   
then later states that the L eye   
appears to droop sometimes. Patient   
does have wheezing during interview   
but states chronic and blames on ASA.   
Voice becomes more hoarse as the day   
goes on but not always. Unclear if   
temperature influence on symptoms.   
States overall easily drained in   
heat.  
  
Patient states that was not aware of   
symptoms until 3 weeks after surgery.  
  
States can choke on a pill, a piece   
of meat, a carrot... however, does   
not recall choking on liquids.  
  
States spine surgery is who referred   
patient to ENT at time of 1 year   
follow up. Surgery was for severe C   
spine stenosis.  
  
States has had multiple episodes   
since surgery where she could not get   
words out or pronounce words. Stroke   
risk factors of CAD, HTN, Elevated   
LDL.  
  
TSH  
Date Value Ref Range Status  
2024 2.540 0.270 - 4.200 mIU/L   
Final  
  
REVIEW OF SYSTEMS  
GENERAL:No weight loss, malaise or   
fevers.  
HEENT:Negative for frequent or   
significant headaches, No changes in   
hearing or vision, no nose bleeds or   
other nasal problems  
NECK:Chronic neck pain.  
RESPIRATORY: Positive for cough,   
wheezing and shortness of breath when   
tired or with exertion.  
CARDIOVASCULAR: Negative for chest   
pain, or palpitations.  
GASTROINTESTINAL: Negative for   
abdominal discomfort, blood in stools   
or black stools or change in bowel   
habits  
GENITOURINARY: No history of dysuria,   
frequency or incontinence  
MUSCULOSKELETAL: Negative for joint   
pain or swelling, back pain or muscle   
pain.  
NEUROLOGIC:Negative for focal   
numbness or weakness, headaches and   
dizziness or syncope, vision changes,   
speech/language changes, changes in   
gait or falls -- besides those   
complaints as above in HPI.  
SKIN:Negative for lesions, rash, and   
itching.  
HEMATOLOGIC/LYMPHATIC/IMMUNOLOGIC:Neg  
ative for prolonged bleeding,   
bruising easily or swollen nodes.  
ENDOCRINE: Negative for cold or heat   
intolerance, polyuria, polydipsia and   
goiter.  
The remainder of the ROS was reviewed   
and is negative.  
  
LAB/IMAGING:  
Reviewed and include:  
WBC (k/uL)  
Date Value  
2023 8.27  
  
RBC (m/uL)  
Date Value  
2023 4.23  
  
Hemoglobin (g/dL)  
Date Value  
2023 12.3  
  
Hematocrit (%)  
Date Value  
2023 38.6  
  
MCV (fL)  
Date Value  
2023 91.3  
  
MCH (pg)  
Date Value  
2023 29.1  
  
MCHC (g/dL)  
Date Value  
2023 31.9  
  
RDW-CV (%)  
Date Value  
2023 13.5  
  
Platelet Count (k/uL)  
Date Value  
2023 229  
  
MPV (fL)  
Date Value  
2023 11.8  
  
Glucose (mg/dL)  
Date Value  
2024 95  
  
BUN (mg/dL)  
Date Value  
2024 15  
  
Creatinine (mg/dL)  
Date Value  
2024 1.01 (H)  
  
Sodium (mmol/L)  
Date Value  
2024 137  
  
Potassium (mmol/L)  
Date Value  
2024 4.1  
  
Chloride (mmol/L)  
Date Value  
2024 103  
  
CO2 (mmol/L)  
Date Value  
2024 25  
  
Protein, Total (g/dL)  
Date Value  
2024 8.0  
  
Albumin (g/dL)  
Date Value  
2024 4.2  
  
Calcium, Total (mg/dL)  
Date Value  
2024 10.2  
  
Alkaline Phosphatase (U/L)  
Date Value  
2024 91  
  
Bilirubin, Total (mg/dL)  
Date Value  
2024 0.3  
  
AST (U/L)  
Date Value  
2024 23  
  
ALT (U/L)  
Date Value  
2024 10  
  
MEDICATIONS:  
lisinopril (ZESTRIL) 5 mg tablet Take   
1 tablet by mouth once daily.   
(Patient taking differently: Take 2.5   
mg by mouth once daily.)  
folic acid 0.8 mg cap Take 300 mg by   
mouth once daily.  
albuterol (PROVENTIL) 2.5 mg /3 mL   
(0.083 %) nebulizer solution Use 3 mL   
via nebulizer every 6 hours as   
needed.  
levalbuterol tartrate HFA (XOPENEX   
HFA) 45 mcg/actuation inhaler Inhale   
2 Puffs as instructed every 6 hours   
as needed.  
vit B complex no.12/niacin,B3,   
(VITAMIN B COMPLEX NO.12-NIACIN ORAL)   
Take by mouth.  
TUMERIC-GING-OLIVE-OREG-CAPRYL ORAL   
Take by mouth.  
Nebulizers (AERONEB GO NEBULIZER) 1   
Each as directed. Portable Nebulizer   
with tubing, Dx: mild persistent   
asthma  
nitroglycerin sublingual (NITROSTAT)   
0.4 mg SL tablet Dissolve 1 tablet   
under the tongue every 5 minutes as   
needed for Chest Pain. Max of 3   
doses. If no relief, call 911.  
MULTIVITAMIN (VITAMIN A DAY ORAL)   
Take by mouth.  
Cholecalciferol, Vitamin D3, 25 mcg   
(1,000 unit) cap Take 1,000 Units by   
mouth once daily.  
Ascorbic Acid 1,000 mg tablet Take   
1,000 mg by mouth once daily.  
Aspirin 81 mg Tab Take 162 mg by   
mouth once daily.  
GLUC COLBERT/CHONDRO COLBERT A/VIT C/MN   
(GLUCOSAMINE 1500 COMPLEX ORAL) Take   
by mouth.  
  
vit B complex-C-folic ac-zinc 800   
mcg- 12.5 mg tab Take by mouth.  
Bisacodyl (DULCOLAX) 5 mg tab Use as   
directed for Miralax / Gatorade Bowel   
Prep Kit  
Lactobac no.41/Bifidobact no.7   
(PROBIOTIC-10 ORAL) Take by mouth.   
(Patient not taking: Reported on   
2024)  
CALCIUM CARBONATE/VITAMIN D3 (CALCIUM   
+ D ORAL) Take by mouth. (Patient not   
taking: Reported on 2024)  
  
HISTORIES  
PAST MEDICAL HISTORY  
Diagnosis Date  
Abdominal pain, right lower quadrant  
Abdominal pain, right upper quadrant  
Cerebrovascular disease, unspecified  
Coronary atherosclerosis of   
unspecified type of vessel, native or   
graft  
Diverticulosis of colon (without   
mention of hemorrhage)  
Essential hypertension, benign  
Intrinsic asthma with status   
asthmaticus  
Mixed hyperlipidemia  
Osteoporosis, unspecified  
Thoracic or lumbosacral neuritis or   
radiculitis, unspecified  
  
FAMILY HISTORY  
Problem Relation Age of Onset  
Stroke Father 52  
heart dx,osteoarthritis  
other (rheumatic fever) Sister  
other (rheumatic fever) Sister  
other (heart disease) Sister  
other (heart disease) Brother  
  
SOCIAL HISTORY  
Social History  
  
Tobacco Use  
Smoking status: Never  
Smokeless tobacco: Never  
Vaping Use  
Vaping Use: Never used  
Substance Use Topics  
Alcohol use: Never  
Drug use: No  
  
PHYSICAL EXAMINATION  
  
/82   Pulse 75   Resp 16   Wt   
76.4 kg (168 lb 6.4 oz)   SpO2 94%     
BMI 29.83 kg/m  
  
GENERAL EXAM:  
General appearance: NAD, pleasant.  
HEENT: NC/AT, nasal congestion   
absent, no oral lesions, membranes   
moist.  
NECK: No definite mass. ROM normal.  
Lungs: +Wheezing - upper airway.  
CV: RRR nl S1, S2  
Abd: Soft, nontender, nondistended.   
Bowel sounds present.  
Extr: No cyanosis, clubbing or edema.   
No evidence of fasciculations.   
Extremity pulses palpable and normal.  
Skin: Cool to touch.  
  
NEUROLOGICAL EXAM:  
General: Awake, alert, oriented x3   
(person,place,time), speech fluent,   
no dysarthria; comprehension, naming,   
repetition intact.  
CN: PERRL, fundi with no evidence of   
papilledema, EOMI and without   
nystagmus, VFF to confrontation,   
facial sensation and strength are   
normal and symmetric, hearing is   
intact to finger rub bilaterally,   
palate and tongue movements are   
intact and symmetric. SCM and   
trapezius strength normal.  
Motor: Normal tone, bulk and strength   
(5/5) bilaterally (throughout   
extremities x4).  
Reflexes: 1/4 and symmetric, plantar   
stimulation is flexor.  
Coordination: FNF, CARL, HTS intact.   
No tremors.  
Sensation: Light touch, vibration,   
temperature intact throughout. No   
evidence of neglect.  
Gait: Stable with normal stride and   
arm swing.  
  
Assessment and Plan:  
ASSESSMENT/PLAN:  
1. Hoarseness - ICD9: 784.42, ICD10:   
R49.0 (primary diagnosis)  
2. Dysphagia, unspecified type -   
ICD9: 787.20, ICD10: R13.10  
3. Swallowing disorder - ICD9:   
787.20, ICD10: R13.10  
4. S/P cervical spinal fusion - ICD9:   
V45.4, ICD10: Z98.1  
5. Wheezing [R06.2] - ICD9: 786.07,   
ICD10: R06.2  
6. Episode of change in speech   
[R47.89] - ICD9: 784.59, ICD10:   
R47.89  
  
Patient with dysphagia, cough, change   
in voice; with symptoms appearing to   
have started following C spine fusion   
as above. Since that time, she has   
followed with spine surgeons as well   
as PCP and ENT. Endoscopy   
unremarkable per report. While   
dysphagia can occur as a complication   
following anterior C spine surgery,   
in most cases it is mild and improves   
over time -- pt feels her symptoms   
are worsening. Thus, need to consider   
other possible etiologies. With no   
structural abnormality on ENT   
evaluation, need to consider   
following in ddx:  
  
-Possible posterior fossa stroke with   
patient also reporting history of   
other stroke like symptoms and stroke   
risk factors as above. To further   
evaluate will get MRI brain with MRA   
head and neck.  
-Possible neuromuscular disorder such   
as MG. Could not provoke impairment   
in EOM or ptosis during visit. Does   
report fatigue and worsening function   
in warm temperatures. Will get   
following labs to assess:  
- ACETYLCHOLINE REC BINDING AB  
- ACETYLCHOLINE RECEPTOR MODULATING   
ANTIBODY  
- ACETYLCHOLINE REC BLOCKING AB  
-Patient is on Lisinopril which could   
cause cough and angioedema. Will ask   
PCP if Lisinopril could be held to   
see if any improvement of symptoms.  
-Refer to speech therapy for swallow   
evaluation including imaging studies   
if they feel appropriate (no prior   
MBS).  
-Symptoms could still in fact be   
secondary to surgery, but feel need   
to rule out other possible causes as   
above.  
  
Eugene Sepulveda MD  
  
Medical Decision Making:  
  
Problems:  
Moderate: New problem with uncertain   
prognosis  
Data:  
Unique test result(s) reviewed: 2  
Unique test(s) ordered: 2  
  
Medical Decision Making Level: 4 -   
Moderate  
  
I spent a total of 45+ minutes on the   
date of the service which included   
preparing to see the patient,   
face-to-face patient care, completing   
clinical documentation, obtaining   
and/or reviewing separately obtained   
history, performing a medically   
appropriate examination, counseling   
and educating the   
patient/family/caregiver, ordering   
medications, tests, or procedures,   
communicating with other HCPs (not   
separately reported), and   
communicating results to the   
patient/family/caregiver.  
  
  
Electronically signed by Eugene Sepulveda Jr., MD at 2024 11:57   
AM EDT  
documented in this encounter            [facility]  
   
                                                    2024 Telephone   
encounter Note                          Formatting of this note might be   
different from the original.  
Patient returned call and went over   
results, notes from Dr Rodriguez with   
understanding.  
Electronically signed by Sepideh Parr LPN at 2024 12:38 PM EDT  
                                        [facility]  
   
                                                    2024 Miscellaneous   
Notes                                   Formatting of this note might be   
different from the original.  
Patient returned call and went over   
results, notes from Dr Rodriguez with   
understanding.  
Electronically signed by Sepideh Parr LPN at 2024 12:38 PM EDT  
Formatting of this note might be   
different from the original.  
Called and left message on patients   
voicemail to return call to the   
office and ask to speak with a    
triage nurse.  
  
Katharine Bethea MA  
  
Electronically signed by Katharine Bethea MA at 2024 9:43 AM EDT  
Formatting of this note might be   
different from the original.  
Please inform patient that her ECHO   
is overall normal, no concerns  
Bebeto Rodriguez DO  
  
Electronically signed by Bebeto Rodriguez DO at 2024 4:53 PM   
EDT  
documented in this encounter            [facility]  
   
                                                    2024 Telephone   
encounter Note                          Formatting of this note is different   
from the original.  
Patient has been identified by name   
and date of birth:  
Patient phones for refill(s):  
Requested Prescriptions  
  
Pending Prescriptions Disp Refills  
lisinopril (ZESTRIL) 5 mg tablet 90   
tablet 1  
Sig: Take 1 tablet by mouth once   
daily.  
  
Date of last office visit in primary   
care: 2024  
Date of next office visit in primary   
care:  
no future appt scheduled  
  
Please advise. Thank you.  
Sepideh Parr LPN.  
Electronically signed by Sepideh Parr LPN at 2024 12:37 PM EDT  
                                        [facility]  
   
                                                    2024 Miscellaneous   
Notes                                   Formatting of this note is different   
from the original.  
Patient has been identified by name   
and date of birth:  
Patient phones for refill(s):  
Requested Prescriptions  
  
Pending Prescriptions Disp Refills  
lisinopril (ZESTRIL) 5 mg tablet 90   
tablet 1  
Sig: Take 1 tablet by mouth once   
daily.  
  
Date of last office visit in primary   
care: 2024  
Date of next office visit in primary   
care:  
no future appt scheduled  
  
Please advise. Thank you.  
Sepideh Parr LPN.  
Electronically signed by Sepideh Parr LPN at 2024 12:37 PM EDT  
documented in this encounter            [facility]  
   
                                                    2024 Telephone   
encounter Note                          Formatting of this note might be   
different from the original.  
Called and left message on patients   
voicemail to return call to the   
office and ask to speak with a FM   
triage nurse.  
  
Katharine Bethea MA  
  
Electronically signed by Katharine Bethea MA at 2024 9:43 AM EDT  
                                        [facility]  
   
                                                    2024 Telephone   
encounter Note                          Formatting of this note might be   
different from the original.  
Please inform patient that her ECHO   
is overall normal, no concerns  
Bebeto Rodriguez DO  
  
Electronically signed by Bebeto Rodriguez DO at 2024 4:53 PM   
EDT  
                                        [facility]  
   
                                                    2024 Telephone   
encounter Note                          Formatting of this note might be   
different from the original.  
Pt called and is notified of   
providers results. Pt voices   
understanding.  
  
Shaniqua Michelle, RN  
  
Electronically signed by Shaniqua Michelle RN at 2024 3:34 PM EDT  
                                        [facility]  
   
                                                    2024 Miscellaneous   
Notes                                   Formatting of this note might be   
different from the original.  
Pt called and is notified of   
providers results. Pt voices   
understanding.  
  
Shaniqua Michelle, [place]  
  
Electronically signed by Shaniqua Michelle RN at 2024 3:34 PM EDT  
Formatting of this note might be   
different from the original.  
Please let patient know that her labs   
look good. Her calcium is back within   
normal range. The rest of her labs   
are normal. Kidney function is   
stable.  
  
Sammy Coates PA-C  
2024  
  
  
Electronically signed by Sammy Coates PA-C at 2024 2:47   
PM EDT  
documented in this encounter            [facility]  
   
                                                    2024 Telephone   
encounter Note                          Formatting of this note might be   
different from the original.  
Please let patient know that her labs   
look good. Her calcium is back within   
normal range. The rest of her labs   
are normal. Kidney function is   
stable.  
  
Sammy Coates PA-C  
2024  
  
  
Electronically signed by Sammy Coates PA-C at 2024 2:47   
PM EDT  
                                        [facility]  
   
                                                    2024 Miscellaneous   
Notes                                   Formatting of this note might be   
different from the original.  
Phoned patient and went over results   
from Dr Rodriguez with understanding.  
Electronically signed by Sepideh Parr LPN at 2024 11:24 AM EDT  
Formatting of this note might be   
different from the original.  
Please inform patient that her CXR is   
normal  
Bebeto Rodriguez DO  
  
Electronically signed by Bebeto Rodriguez DO at 2024 5:40 PM   
EDT  
documented in this encounter            [facility]  
   
                                                    2024 Telephone   
encounter Note                          Formatting of this note might be   
different from the original.  
Phoned patient and went over results   
from Dr Rodriguez with understanding.  
Electronically signed by Sepideh Parr LPN at 2024 11:24 AM EDT  
                                        [facility]  
   
                                                    2024 Telephone   
encounter Note                          Formatting of this note might be   
different from the original.  
Please inform patient that her CXR is   
normal  
Bebeto Rodriguez DO  
  
Electronically signed by Bebeto Rodriguez DO at 2024 5:40 PM   
EDT  
                                        [facility]  
   
                                                    2024 History of   
Present illness Narrative               Formatting of this note might be   
different from the original.  
Radiology Service Progress Note  
  
PATIENT NAME: Elmira Warner  
MRN: [id number]  
  
DATE OF SERVICE: May 1, 2024  
TIME: 12:18 PM  
PATIENT IDENTITY VERIFICATION   
COMPLETED USING TWO (2) IDENTIFIERS:   
Name and Date of Birth confirmed by   
patient verbally.  
FALL SCREENING: Has the patient had 2   
falls in the last year or 1 fall with   
injury or currently using an   
Ambulatory Assistive Device (Walker,   
Cane, Wheelchair, Crutches, etc.)? No  
PATIENT GENDER DATA: Female.   
Pregnancy status: Pregnant: No   
Breastfeeding status: NO.  
PATIENT RELEVANT IMPLANT DATA   
REVIEWED: Not Applicable  
PATIENT PRESENTS WITH AN IMPLANTABLE   
OR ATTACHED MEDICAL DEVICE: No  
  
RADIOLOGY DEPARTMENT: General X-ray:   
Exam(s) Completed: Chest X-Ray  
  
PERIPHERAL IV DATA: Not applicable  
  
SIGNED BY: RT Lori(R)  
May 1, 2024 12:18 PM  
  
  
Electronically signed by Yayo Fraser RT(R) at 2024 12:24   
PM EDT  
documented in this encounter            [facility]  
   
                                        2024 Note     HNO ID: [id number]  
Author: YAYO FRASER RT(R)  
Service: Radiology  
Author Type: Technologist  
Type: Progress Notes  
Filed: 2024 12:24  
Note Text:  
Radiology Service Progress Note  
PATIENT NAME: Elmira Warner  
MRN: [id number]  
DATE OF SERVICE: May 1, 2024  
TIME: 12:18 PM  
PATIENT IDENTITY VERIFICATION   
COMPLETED USING TWO (2) IDENTIFIERS:   
Name and  
Date of Birth confirmed by patient   
verbally.  
FALL SCREENING: Has the patient had 2   
falls in the last year or 1 fall with  
injury or currently using an   
Ambulatory Assistive Device (Walker,   
Cane,  
Wheelchair, Crutches, etc.)? No  
PATIENT GENDER DATA: Female.   
Pregnancy status: Pregnant: No   
Breastfeeding  
status: NO.  
PATIENT RELEVANT IMPLANT DATA   
REVIEWED: Not Applicable  
PATIENT PRESENTS WITH AN IMPLANTABLE   
OR ATTACHED MEDICAL DEVICE: No  
RADIOLOGY DEPARTMENT: General X-ray:   
Exam(s) Completed: Chest X-Ray  
PERIPHERAL IV DATA: Not applicable  
SIGNED BY: RT Lori(R)  
May 1, 2024 12:18 PM                    [facility]  
   
                                        2024 Instructions   
  
  
Bebeto Rodriguez DO - 2024   
11:39 AM EDTFormatting of this note   
might be different from the original.  
Mrs. Alvarez's kitchen  
Address: [address] [place] [address], [place] [address]  
Phone: [phone]  
  
D- mannose supplement 50 mg-100 mg a   
day  
Electronically signed by Bebeto Rodriguez DO at 2024 11:46 AM   
EDT  
  
documented in this encounter            [facility]  
   
                                        2024 Note     HNO ID: [id number]  
Author: BEBETO RODRIGUEZ DO  
Service: ?  
Author Type: Physician  
Type: Progress Notes  
Filed: 2024 12:39  
Note Text:  
CC: Elmira Warner is a 79 year   
old female who presents to the office   
to  
establish care.  
HPI:  
CAD, seeing DR. Carmen for   
cardiologist, last nuclear stress   
testing  and  
stable. Has hx of 7 stents coronary   
arteries. Does get some intermittent   
cough  
and dyspnea and chest pressure- not   
always exertional. No syncope or  
palpitations etc.  
HPL, diet controlled, she refuses   
statin therapy  
HTN, taking lisinopril. Not   
interested in increasing the dose.   
She tolerates  
well and states BLOOD PRESSURE at   
home 130s/80s consistently  
Arthritis, stable, chronic, no falls.   
Refuses to take NSAIDs  
Asthma, chronic intermittent cough,   
no hemoptysis or distress  
Hoarseness, coordination of   
swallowing issues. Has had   
chronically. Has been  
seen by ENT Dr. Kumari whom   
recommended that she sees Neurologist   
for additional  
testing  
PAST MEDICAL HISTORY  
Diagnosis Date  
Abdominal pain, right lower quadrant  
Abdominal pain, right upper quadrant  
Cerebrovascular disease, unspecified  
Coronary atherosclerosis of   
unspecified type of vessel, native or   
graft  
Diverticulosis of colon (without   
mention of hemorrhage)  
Essential hypertension, benign  
Intrinsic asthma with status   
asthmaticus  
Mixed hyperlipidemia  
Osteoporosis, unspecified  
Thoracic or lumbosacral neuritis or   
radiculitis, unspecified  
PAST SURGICAL HISTORY  
Procedure Laterality Date  
ANGIOPLASTY times 3  
7 stents  
APPENDECTOMY  
COLONOSCOPY FLX DX W/COLLJ SPEC WHEN   
[facility] 12 years ago  
Colonoscopy  
COLONOSCOPY FLX DX W/COLLJ SPEC WHEN   
[facility] 2012  
Colonoscopy  
HYSTERECTOMY HX  
PAST SURGICAL HISTORY OF   
cervical spinal fusion  
Social History:  
Social History  
Tobacco Use  
Smoking status: Never  
Smokeless tobacco: Never  
Vaping Use  
Vaping Use: Never used  
Substance Use Topics  
Alcohol use: Never  
Drug use: No  
FAMILY HISTORY  
Problem Relation Age of Onset  
Stroke Father 52  
heart dx,osteoarthritis  
other (rheumatic fever) Sister  
other (rheumatic fever) Sister  
other (heart disease) Sister  
other (heart disease) Brother  
Current Outpatient prescriptions:  
folic acid 0.8 mg cap Take 300 mg by   
mouth once daily.  
vit B complex-C-folic ac-zinc 800   
mcg- 12.5 mg tab Take by mouth.  
albuterol (PROVENTIL) 2.5 mg /3 mL   
(0.083 %) nebulizer solution Use 3 mL   
via  
nebulizer every 6 hours as needed.  
levalbuterol tartrate HFA (XOPENEX   
HFA) 45 mcg/actuation inhaler Inhale   
2 Puffs  
as instructed every 6 hours as   
needed.  
lisinopril (ZESTRIL) 5 mg tablet Take   
1 tablet by mouth once daily.  
vit B complex no.12/niacin,B3,   
(VITAMIN B COMPLEX NO.12-NIACIN ORAL)   
Take by  
mouth.  
TUMERIC-GING-OLIVE-OREG-CAPRYL ORAL   
Take by mouth.  
Bisacodyl (DULCOLAX) 5 mg tab Use as   
directed for Miralax / Gatorade Bowel   
Prep  
Kit  
Nebulizers (AERONEB GO NEBULIZER) 1   
Each as directed. Portable Nebulizer   
with  
tubing, Dx: mild persistent asthma  
Lactobac no.41/Bifidobact no.7   
(PROBIOTIC-10 ORAL) Take by mouth.  
nitroglycerin sublingual (NITROSTAT)   
0.4 mg SL tablet Dissolve 1 tablet   
under  
the tongue every 5 minutes as needed   
for Chest Pain. Max of 3 doses. If no  
relief, call 911.  
MULTIVITAMIN (VITAMIN A DAY ORAL)   
Take by mouth.  
Cholecalciferol, Vitamin D3, 25 mcg   
(1,000 unit) cap Take 1,000 Units by   
mouth  
once daily.  
Ascorbic Acid 1,000 mg tablet Take   
1,000 mg by mouth once daily.  
Aspirin 81 mg Tab Take 162 mg by   
mouth once daily.  
GLUC COLBERT/CHONDRO COLBERT A/VIT C/MN   
(GLUCOSAMINE 1500 COMPLEX ORAL) Take   
by mouth.  
CALCIUM CARBONATE/VITAMIN D3 (CALCIUM   
+ D ORAL) Take by mouth.  
Allergies:  
ALLERGIES  
Allergen Reactions  
Adhesive Tape-Silic* Rash  
Demerol [Meperidine* Vomiting  
migraine  
Influenza Vaccine T* Diarrhea  
vomiting/fever  
Iodine Other: See Comments  
sick /headaches  
Penicillins Hives  
throat and mouth swelling  
Shellfish Hives  
ROS:  
See HPI  
PE:  
05/01/24  
1118  
BP: 162/74  
Pulse: 80  
Resp: 16  
Temp: 36.7 ?C (98.1 ?F)  
TempSrc: Left Tympanic  
Weight: 77.6 kg (171 lb)  
Gen: AANDO, NAD, non-toxic appearing,   
Pleasant, cooperative  
HEENT: NT/AC, PERRLA, EOMs intact   
b/l, nares clear and patent b/l,   
pharynx  
without erythema, exudate or lesions.   
Uvula midline. EACs without erythema   
or  
debris. TMs pearly grey with intact   
landmarks b/l. hoarseness  
Neck: supple, No cervical LAD, no   
thyromegaly, no carotid bruits  
CV: RRR, normal S1 and S2, no   
murmurs, no gallops, no rubs, Pulses   
2+ and  
symmetric in UE and LE b/l  
Lungs: normal respiratory effort, CTA   
b/l, no wheezing or rhonchi or rales  
Abd: soft, overweight, NT, ND, +BS,   
no hepatosplenomegaly  
MS: arthritis changes diffusely  
Neuro: CN II-XII intact b/l, strength   
5/5 b/l UE and LE, DTRs 2/4 UE and   
LE,  
sensation intact.  
Skin: warm, dry, intact, scattered   
seborrheic keratoses and solar   
lentigos  
ASSESSMENT/PLAN:  
1. Chronic cough - ICD9: 786.2,   
ICD10: R05.3 (primary diagn (more   
content not included)...                [facility]  
   
                                                    2024 History of   
Present illness Narrative               Formatting of this note is different   
from the original.  
CC: Elmira Warner is a 79 year   
old female who presents to the office   
to establish care.  
  
HPI:  
  
CAD, seeing DR. Carmen for   
cardiologist, last nuclear stress   
testing  and stable. Has hx of 7   
stents coronary arteries. Does get   
some intermittent cough and dyspnea   
and chest pressure- not always   
exertional. No syncope or   
palpitations etc.  
  
HPL, diet controlled, she refuses   
statin therapy  
  
HTN, taking lisinopril. Not   
interested in increasing the dose.   
She tolerates well and states BLOOD   
PRESSURE at home 130s/80s   
consistently  
  
Arthritis, stable, chronic, no falls.   
Refuses to take NSAIDs  
  
Asthma, chronic intermittent cough,   
no hemoptysis or distress  
  
Hoarseness, coordination of   
swallowing issues. Has had   
chronically. Has been seen by ENT Dr. Kumari whom recommended that she sees   
Neurologist for additional testing  
  
PAST MEDICAL HISTORY  
Diagnosis Date  
Abdominal pain, right lower quadrant  
Abdominal pain, right upper quadrant  
Cerebrovascular disease, unspecified  
Coronary atherosclerosis of   
unspecified type of vessel, native or   
graft  
Diverticulosis of colon (without   
mention of hemorrhage)  
Essential hypertension, benign  
Intrinsic asthma with status   
asthmaticus  
Mixed hyperlipidemia  
Osteoporosis, unspecified  
Thoracic or lumbosacral neuritis or   
radiculitis, unspecified  
  
PAST SURGICAL HISTORY  
Procedure Laterality Date  
ANGIOPLASTY times 3  
7 stents  
APPENDECTOMY  
COLONOSCOPY FLX DX W/COLLJ SPEC WHEN   
PFRMD 12 years ago  
Colonoscopy  
COLONOSCOPY FLX DX W/COLLJ SPEC WHEN   
PFRMD 2012  
Colonoscopy  
HYSTERECTOMY HX  
PAST SURGICAL HISTORY OF   
cervical spinal fusion  
  
  
Social History:  
Social History  
  
Tobacco Use  
Smoking status: Never  
Smokeless tobacco: Never  
Vaping Use  
Vaping Use: Never used  
Substance Use Topics  
Alcohol use: Never  
Drug use: No  
  
FAMILY HISTORY  
Problem Relation Age of Onset  
Stroke Father 52  
heart dx,osteoarthritis  
other (rheumatic fever) Sister  
other (rheumatic fever) Sister  
other (heart disease) Sister  
other (heart disease) Brother  
  
Current Outpatient prescriptions:  
folic acid 0.8 mg cap Take 300 mg by   
mouth once daily.  
vit B complex-C-folic ac-zinc 800   
mcg- 12.5 mg tab Take by mouth.  
albuterol (PROVENTIL) 2.5 mg /3 mL   
(0.083 %) nebulizer solution Use 3 mL   
via nebulizer every 6 hours as   
needed.  
levalbuterol tartrate HFA (XOPENEX   
HFA) 45 mcg/actuation inhaler Inhale   
2 Puffs as instructed every 6 hours   
as needed.  
lisinopril (ZESTRIL) 5 mg tablet Take   
1 tablet by mouth once daily.  
vit B complex no.12/niacin,B3,   
(VITAMIN B COMPLEX NO.12-NIACIN ORAL)   
Take by mouth.  
TUMERIC-GING-OLIVE-OREG-CAPRYL ORAL   
Take by mouth.  
Bisacodyl (DULCOLAX) 5 mg tab Use as   
directed for Miralax / Gatorade Bowel   
Prep Kit  
Nebulizers (AERONEB GO NEBULIZER) 1   
Each as directed. Portable Nebulizer   
with tubing, Dx: mild persistent   
asthma  
Lactobac no.41/Bifidobact no.7   
(PROBIOTIC-10 ORAL) Take by mouth.  
nitroglycerin sublingual (NITROSTAT)   
0.4 mg SL tablet Dissolve 1 tablet   
under the tongue every 5 minutes as   
needed for Chest Pain. Max of 3   
doses. If no relief, call 911.  
MULTIVITAMIN (VITAMIN A DAY ORAL)   
Take by mouth.  
Cholecalciferol, Vitamin D3, 25 mcg   
(1,000 unit) cap Take 1,000 Units by   
mouth once daily.  
Ascorbic Acid 1,000 mg tablet Take   
1,000 mg by mouth once daily.  
Aspirin 81 mg Tab Take 162 mg by   
mouth once daily.  
GLUC COLBERT/CHONDRO COLBERT A/VIT C/MN   
(GLUCOSAMINE 1500 COMPLEX ORAL) Take   
by mouth.  
  
CALCIUM CARBONATE/VITAMIN D3 (CALCIUM   
+ D ORAL) Take by mouth.  
  
Allergies:  
ALLERGIES  
Allergen Reactions  
Adhesive Tape-Silic* Rash  
Demerol [Meperidine* Vomiting  
migraine  
Influenza Vaccine T* Diarrhea  
vomiting/fever  
Iodine Other: See Comments  
sick /headaches  
Penicillins Hives  
throat and mouth swelling  
Shellfish Hives  
  
ROS:  
See HPI  
  
PE:  
  
24  
1118  
BP: 162/74  
Pulse: 80  
Resp: 16  
Temp: 36.7 C (98.1 F)  
TempSrc: Left Tympanic  
Weight: 77.6 kg (171 lb)  
  
Gen: A&O, NAD, non-toxic appearing,   
Pleasant, cooperative  
HEENT: NT/AC, PERRLA, EOMs intact   
b/l, nares clear and patent b/l,   
pharynx without erythema, exudate or   
lesions. Uvula midline. EACs without   
erythema or debris. TMs pearly grey   
with intact landmarks b/l. hoarseness  
Neck: supple, No cervical LAD, no   
thyromegaly, no carotid bruits  
CV: RRR, normal S1 and S2, no   
murmurs, no gallops, no rubs, Pulses   
2+ and symmetric in UE and LE b/l  
Lungs: normal respiratory effort, CTA   
b/l, no wheezing or rhonchi or rales  
Abd: soft, overweight, NT, ND, +BS,   
no hepatosplenomegaly  
MS: arthritis changes diffusely  
Neuro: CN II-XII intact b/l, strength   
5/5 b/l UE and LE, DTRs 2/4 UE and   
LE, sensation intact.  
Skin: warm, dry, intact, scattered   
seborrheic keratoses and solar   
lentigos  
  
ASSESSMENT/PLAN:  
1. Chronic cough - ICD9: 786.2,   
ICD10: R05.3 (primary diagnosis)  
CXR and ECHO as ordered  
Consider PFTs  
Hx of Asthma  
- XR CHEST 2V FRONTAL/LAT  
- ECHO  
- PERFLUTREN LIPID MICROSPHERES 1.1   
MG/ML INJECTION IN NS 10 ML  
- SODIUM CHLORIDE 0.9 % (FLUSH)   
INJECTION SYRINGE  
  
2. Hoarseness - ICD9: 784.42, ICD10:   
R49.0  
Has seen ENT  
Need for Gastro/Neurologist opinion   
as well as labs as ordered  
- CONSULT TO NEUROLOGY  
- THYROID STIMULATING HORMONE  
- T4 FREE/FREE THYROXINE  
  
3. Dysphagia, unspecified type -   
ICD9: 787.20, ICD10: R13.10  
Has seen ENT  
Need for Gastro/Neurologist opinion   
as well as labs as ordered  
- CONSULT TO NEUROLOGY  
- THYROID STIMULATING HORMONE  
- T4 FREE/FREE THYROXINE  
  
4. Swallowing disorder - ICD9:   
787.20, ICD10: R13.10  
Has seen ENT  
Need for Gastro/Neurologist opinion   
as well as labs as ordered  
- CONSULT TO NEUROLOGY  
  
5. SOB (shortness of breath) - ICD9:   
786.05, ICD10: R06.02  
CXR and ECHO as ordered  
Consider PFTs  
Hx of Asthma  
- XR CHEST 2V FRONTAL/LAT  
- ECHO  
- PERFLUTREN LIPID MICROSPHERES 1.1   
MG/ML INJECTION IN NS 10 ML  
- SODIUM CHLORIDE 0.9 % (FLUSH)   
INJECTION SYRINGE  
  
6. Coronary artery disease due to   
lipid rich plaque - ICD9: 414.00,   
414.3, ICD10: I25.10, I25.83  
CXR and ECHO as ordered  
Consider PFTs  
Hx of Asthma  
- ECHO  
- PERFLUTREN LIPID MICROSPHERES 1.1   
MG/ML INJECTION IN NS 10 ML  
- SODIUM CHLORIDE 0.9 % (FLUSH)   
INJECTION SYRINGE  
  
7. Hyperlipidemia, mixed - ICD9:   
272.2, ICD10: E78.2  
CXR and ECHO as ordered  
Consider PFTs  
Hx of Asthma  
She refuses statin therapy  
- ECHO  
- PERFLUTREN LIPID MICROSPHERES 1.1   
MG/ML INJECTION IN NS 10 ML  
- SODIUM CHLORIDE 0.9 % (FLUSH)   
INJECTION SYRINGE  
  
8. Essential hypertension - ICD9:   
401.9, ICD10: I10  
CXR and ECHO as ordered  
Consider PFTs  
Hx of Asthma  
- Recommend home blood pressure   
monitoring, to bring results to next   
visit  
- Encouraged sodium restriction, DASH   
or Mediterranean diet  
- Recommend regular aerobic exercise  
- ECHO  
- PERFLUTREN LIPID MICROSPHERES 1.1   
MG/ML INJECTION IN NS 10 ML  
- SODIUM CHLORIDE 0.9 % (FLUSH)   
INJECTION SYRINGE  
  
9. Hypercalcemia - ICD9: 275.42,   
ICD10: E83.52  
Check labs as ordered  
asymptomatic  
- PTH INTACT  
- COMPREHENSIVE METABOLIC PANEL  
- LACTATE DEHYDROGENASE  
  
Bebeto Rodriguez DO  
  
To ER if develops chest pain,   
shortness of breath, or severe   
worsening of symptoms.  
Discussed risks, benefits,   
alternatives, and potential side   
effects of medications.  
Patient expressed understanding and   
agreed with the plan.  
  
Bebeto Rodriguez DO  
[address] [place] [address]  
Phone: [phone]  
Fax: [phone]  
  
Electronically signed by Bebeto Rodriguez DO at 2024 12:39 PM   
EDT  
Formatting of this note might be   
different from the original.  
Bebeto Rodriguez DO  
  
Electronically signed by Bebeto Rodriguez DO at 2024 12:39 PM   
EDT  
documented in this encounter            [facility]  
   
                                        2024 Note     HNO ID: [id number]  
Author: BEBETO RODRIGUEZ DO  
Service: ?  
Author Type: Physician  
Type: Progress Notes  
Filed: 2024 12:39  
Note Text:  
Bebeto Rodriguez DO                   [facility]  
   
                                        2024 Note     HNO ID: [id number]  
Author: KINJAL CARMEN MD  
Service: ?  
Author Type: Physician  
Type: Progress Notes  
Filed: 2024 15:29  
Note Text:  
Kinjal Carmen MD  
Interventional Cardiology  
[address] [address]  
Chief Complaint  
Patient presents with:  
yearly check  
HISTORY OF PRESENT ILLNESS:  
Ms. Warner is a 78 year old female   
seen in my office for follow-up prior  
history of coronary artery disease   
with angioplasty of the LAD and the   
right  
coronary artery drug-eluting stent   
patient is doing well from a cardiac   
point of  
view asymptomatic denies chest pain   
or shortness of breath prior history   
of  
asthma hypertensive heart disease and   
hyperlipidemia  
Cardiac Risk Factors  
age (male over 45, female over 55),   
hyperlipidemia, hypertension, family   
history  
of CAD  
PAST MEDICAL HISTORY  
Diagnosis Date  
Abdominal pain, right lower quadrant  
Abdominal pain, right upper quadrant  
Cerebrovascular disease, unspecified  
Coronary atherosclerosis of   
unspecified type of vessel, native or   
graft  
Diverticulosis of colon (without   
mention of hemorrhage)  
Essential hypertension, benign  
Intrinsic asthma with status   
asthmaticus  
Mixed hyperlipidemia  
Osteoporosis, unspecified  
Thoracic or lumbosacral neuritis or   
radiculitis, unspecified  
PAST SURGICAL HISTORY  
Procedure Laterality Date  
ANGIOPLASTY times 3  
7 stents  
APPENDECTOMY  
COLONOSCOPY FLX DX W/COLLJ SPEC WHEN   
PFRMD 12 years ago  
Colonoscopy  
COLONOSCOPY FLX DX W/COLLJ SPEC WHEN   
PFRMD 2012  
Colonoscopy  
HYSTERECTOMY HX  
PAST SURGICAL HISTORY OF   
cervical spinal fusion  
FAMILY HISTORY  
Problem Relation Age of Onset  
Stroke Father 52  
heart dx,osteoarthritis  
other (rheumatic fever) Sister  
other (rheumatic fever) Sister  
other (heart disease) Sister  
other (heart disease) Brother  
Social History  
Tobacco Use  
Smoking status: Never  
Smokeless tobacco: Never  
Vaping Use  
Vaping Use: Never used  
Substance Use Topics  
Alcohol use: Never  
Drug use: No  
ALLERGIES  
Allergen Reactions  
Adhesive Tape-Silic* Rash  
Demerol [Meperidine* Vomiting  
migraine  
Influenza Vaccine T* Diarrhea  
vomiting/fever  
Iodine Other: See Comments  
sick /headaches  
Penicillins Hives  
throat and mouth swelling  
Shellfish Hives  
Medications:  
Current Outpatient Medications  
Medication Sig Dispense Refill  
folic acid 0.8 mg cap Take 300 mg by   
mouth once daily.  
vit B complex-C-folic ac-zinc 800   
mcg- 12.5 mg tab Take by mouth.  
albuterol (PROVENTIL) 2.5 mg /3 mL   
(0.083 %) nebulizer solution Use 3 mL   
via  
nebulizer every 6 hours as needed. 30   
mL 0  
levalbuterol tartrate HFA (XOPENEX   
HFA) 45 mcg/actuation inhaler Inhale   
2 Puffs  
as instructed every 6 hours as   
needed. 1 Each 1  
lisinopril (ZESTRIL) 5 mg tablet Take   
1 tablet by mouth once daily. 90   
tablet 1  
vit B complex no.12/niacin,B3,   
(VITAMIN B COMPLEX NO.12-NIACIN ORAL)   
Take by  
mouth.  
TUMERIC-GING-OLIVE-OREG-CAPRYL ORAL   
Take by mouth.  
Bisacodyl (DULCOLAX) 5 mg tab Use as   
directed for Miralax / Gatorade Bowel   
Prep  
Kit 4 tablet 0  
Nebulizers (AERONEB GO NEBULIZER) 1   
Each as directed. Portable Nebulizer   
with  
tubing, Dx: mild persistent asthma 1   
Each 0  
Lactobac no.41/Bifidobact no.7   
(PROBIOTIC-10 ORAL) Take by mouth.  
nitroglycerin sublingual (NITROSTAT)   
0.4 mg SL tablet Dissolve 1 tablet   
under  
the tongue every 5 minutes as needed   
for Chest Pain. Max of 3 doses. If no  
relief, call 911. 1 Bottle of 25 1  
MULTIVITAMIN (VITAMIN A DAY ORAL)   
Take by mouth.  
Cholecalciferol, Vitamin D3, 25 mcg   
(1,000 unit) cap Take 1,000 Units by   
mouth  
once daily.  
Ascorbic Acid 1,000 mg tablet Take   
1,000 mg by mouth once daily.  
Aspirin 81 mg Tab Take 162 mg by   
mouth once daily.  
GLUC COLBERT/CHONDRO COLBERT A/VIT C/MN   
(GLUCOSAMINE 1500 COMPLEX ORAL) Take   
by mouth.  
CALCIUM CARBONATE/VITAMIN D3 (CALCIUM   
+ D ORAL) Take by mouth.  
No current facility-administered   
medications for this visit.  
Review of Systems  
Constitutional: Negative for chills,   
diaphoresis, fever, malaise/fatigue   
and  
weight loss.  
HENT: Negative for congestion, ear   
discharge, ear pain, hearing loss,  
nosebleeds, sinus pain, sore throat   
and tinnitus.  
Eyes: Negative for blurred vision,   
double vision, photophobia, pain,   
discharge  
and redness.  
Respiratory: Negative for cough,   
hemoptysis, sputum production,   
shortness of  
breath, wheezing and stridor.  
Cardiovascular: Negative for chest   
pain, palpitations, orthopnea,   
claudication,  
leg swelling and PND.  
Gastrointestinal: Negative for   
abdominal pain, blood in stool,   
constipation,  
diarrhea, heartburn, melena, nausea   
and vomiting.  
Genitourinary: Negative for dysuria,   
flank pain, frequency, hematuria and  
urgency.  
Musculoskeletal: Negative for back   
pain, falls, joint pain, myalgias and   
neck  
pain.  
Skin: Negative for itching and rash.  
Neurological: Negative for dizziness,   
tingling, tremors, sensory change,   
speech  
change, focal weakness, seizures,   
loss of consciousness, weakness and   
headaches.  
Endo/Heme/A (more content not   
included)...                            [facility]  
   
                                                    2023 Miscellaneous   
Notes                                   Formatting of this note might be   
different from the original.  
Call to patient and confirmed with   
her that inhaler was sent to pharmacy   
for her.  
Electronically signed by Elías Norton LPN at 2023 1:42 PM EDT  
Formatting of this note might be   
different from the original.  
Patient had also requested an   
Albuterol inhaler when she saw the   
provider, but only the Albuterol   
nebulizer Rx was sent in.  
  
She needs this called in to the   
pharmacy today as she is out.  
Electronically signed by Anahy Chaparro   
at 2023 11:10 AM EDT  
documented in this encounter            [facility]  
   
                                        2023 Note     HNO ID: [id number]  
Author: Yanet Shen APRN.CNP  
Service: ?  
Author Type: Nurse Practitioner  
Type: Progress Notes  
Filed: 2023 1:31 PM  
Note Text:                              [facility]  
   
                                                    2023 History of   
Present illness Narrative               Formatting of this note might be   
different from the original.  
  
  
  
Electronically signed by Yanet Shen APRN.CNP at 2023 1:31   
PM EDT  
Formatting of this note is different   
from the original.  
Chief Complaint  
Patient presents with:  
Physical  
  
HPI  
Elmira Warner is a 78 year old   
female who presents here today for   
Above Complaints.  
  
Elmiar is an established patient of   
Dr. Michael DO. She is a new   
patient to me today.  
  
Concerns today...  
  
Routine medicare wellness exam and   
review of chronic conditions.  
  
HPI:  
  
HTN --  
She states compliant with current   
blood pressure medication(s): 2.5 mg   
daily -- cutting tablet in half as   
instructed by PCP She does check BP   
at home. Average home readings:   
120s/80s. Does report elevated a few   
days ago to 160/90s but contributes   
this to pushing neighbor in   
wheelchair that day which is   
strenuous activity for her.  
She denies chest pain, shortness of   
breath, palpitations, dizziness, leg   
edema, headaches, or vision changes.  
Last 14 Encounter BP Readings:  
Date: BP:  
2023 122/70  
2022 150/90  
2022 130/86  
2022 160/100  
2022 150/80  
2022 165/80  
2022 162/104  
2022 134/62  
04/15/2022 118/62  
2022 140/90  
2022 150/86  
01/15/2020 142/72  
2015 141/82  
2012 106/56  
  
HLD --  
Admits to not taking Crestor 5 mg as   
prescribed.  
Does not like to take medications and   
trying supplements instead.  
  
Asking about starting to take   
magnesium chloride for brain health.   
Read about benefits in preventing   
dementia. Wanting to check Mag level.  
  
Headaches --  
Intermittent sharp pain from mastoid   
bone to collarbone regimen x months.  
Sharp pain to frontal forehead and   
temporal area as well, L side > R   
side.  
Pain lasts up to 1 minute at a time.  
Can happen a few times a day, but can   
also go days without any symptoms.  
Worse with leaning over.  
Pain sensation down back of neck.  
Seems worse when playing games on her   
phone.  
Does report sinus tenderness to L   
side of face, forehead and cheeks.  
Massage therapy to neck does help   
improve.  
Ice also helps.  
Has been to ENT for this and they   
said there is nothing more they can   
do.  
  
Very independent. Does all her own   
cooking, laundry, cleaning, finances,   
etc on her own.  
Does report limitations with mobility   
due to neck and shoulder surgery last   
year. Unable to lift  
Does use cane around house and out of   
house only when she feels unsteady.  
Last fall was last October (1 year   
ago).  
  
The pre-visit questionnaire has been   
completed/reviewed/scanned. Concerns   
from the questionnaire: None  
HISTORY REVIEWED: (electronic chart   
appropriately updated)  
- current problem list (as below)  
- medications (as below)  
- allergies (as below)  
- current list of all providers  
PAST MEDICAL HISTORY  
Diagnosis Date  
Abdominal pain, right lower quadrant  
Abdominal pain, right upper quadrant  
Cerebrovascular disease, unspecified  
Coronary atherosclerosis of   
unspecified type of vessel, native or   
graft  
Diverticulosis of colon (without   
mention of hemorrhage)  
Essential hypertension, benign  
Intrinsic asthma with status   
asthmaticus  
Mixed hyperlipidemia  
Osteoporosis, unspecified  
Thoracic or lumbosacral neuritis or   
radiculitis, unspecified  
  
FAMILY HISTORY  
Problem Relation Age of Onset  
Stroke Father 52  
heart dx,osteoarthritis  
other (rheumatic fever) Sister  
other (rheumatic fever) Sister  
other (heart disease) Sister  
other (heart disease) Brother  
  
Social History  
  
Tobacco Use  
Smoking status: Never  
Smokeless tobacco: Never  
Vaping Use  
Vaping Use: Never used  
Substance Use Topics  
Alcohol use: Never  
Drug use: No  
  
Patient Care Team:  
Bebeto Rodriguez DO as PCP -   
General (Family Medicine)  
  
SAFETY ASSESSMENT:  
  
Need help with the phone,   
transportation, shopping, preparing   
meals, housework, laundry,   
medications or managing money? No  
  
Home have rugs in the hallway, lack   
of grab bars in the bathroom, lack of   
handrails on the stairs or have poor   
lighting? No  
  
Mini-Cog cognitive screen: Normal  
  
PHQ-2 screen ( little interest or   
pleasure in doing things  AND   
 feeling down, depressed or   
hopeless ): Negative  
  
PHYSICAL EXAMINATION:  
  
/70   Pulse 84   Resp 16   Ht   
160 cm (5' 3 )   Wt 77 kg (169 lb   
12.8 oz)   SpO2 97%   BMI 30.08 kg/m  
BMI 30.08 kg/(m^2)  
  
General: alert and appropriate, in no   
distress, ambulates and transfers   
well, well-hydrated, well nourished  
Skin: skin color, texture, turgor   
normal, no rashes or lesions noted  
Head: normocephalic, no abnormality   
or lesion noted  
Ears: external ears normal without   
preauricular LNs or mastoid   
tenderness, canals clear, TM's   
normal, hearing grossly normal  
Nose: septum midline without erythema   
or rhinorrhea  
Oropharynx: moist mucous membranes.   
no tonsillar hypertrophy/exudate.   
uvula midline and pharynx   
non-erythematous, lips, teeth and   
gums are without obvious lesion  
Respiratory: breathing non-labored,   
lung fields clear to auscultation   
with good air exchange, no wheezes or   
crackles  
Heart: regular rate and rhythm   
without murmur, gallop or rub  
  
Get Up And Go Test (less than 30   
seconds): Normal  
  
Hearing Test: Normal  
  
Visual Acuity Test: Normal  
  
ASSESSMENT/PLAN:  
1. Medicare annual wellness visit,   
subsequent - ICD9: V70.0, ICD10:   
Z00.00 (primary diagnosis)  
- Counseled on healthy diet and   
regular exercise  
- Calcium intake with supplements or   
by diet of 1000 mg/day for under 50,   
9406-6571 mg/day for 50+  
- Follow up for annual exam in one   
year  
  
2. Mild intermittent asthma without   
complication - ICD9: 493.90, ICD10:   
J45.20  
- Mild intermittent asthma stable  
- Continue current medications,   
refilled.  
- Avoidance of triggers recommended  
- ALBUTEROL SULFATE 2.5 MG/3 ML   
(0.083 %) SOLUTION FOR NEBULIZATION  
  
3. Essential hypertension - ICD9:   
401.9, ICD10: I10  
- Controlled  
- Continue current medications  
- Recommend home blood pressure   
monitoring, to bring results to next   
visit  
- Encouraged sodium restriction, DASH   
or Mediterranean diet  
- Recommend regular aerobic exercise  
- Follow up in 6 months for   
hypertension visit  
- COMP METABOLIC PANEL  
- CBC + DIFF  
  
4. Hyperlipidemia, mixed - ICD9:   
272.2, ICD10: E78.2  
- Control undetermined, due for labs  
- Continue current medications  
- Counseled on healthy diet and   
regular exercise  
- Follow up in 6 months, sooner   
should any other issues arise.  
- LIPID PANEL BASIC  
  
5. IFG (impaired fasting glucose) -   
ICD9: 790.21, ICD10: R73.01  
Recheck A1c.  
- HGB A1C  
  
7. Headaches - ICD9: 784.0, ICD10:   
R51.9  
Tension headaches vs sinus headaches   
vs GCA  
Inflammatory markers to rule out GCA,   
less likely differential given HPI.  
Trial flexeril prn for tension   
headache. Continue with massage   
therapy to MSK neck. Continue with   
ice to area.  
Trial nasal spray decongestant for   
sinus pressure.  
RTO if no improvement in symptoms.  
- C-REACTIVE PROTEIN (CRP)  
- SED RATE WESTERGREN  
- CYCLOBENZAPRINE 10 MG TABLET  
  
8. Vitamin D deficiency - ICD9:   
268.9, ICD10: E55.9  
Recheck levels.  
- VITAMIN D 25 HYDROXY  
  
9. Hypomagnesemia - ICD9: 275.2,   
ICD10: E83.42  
Recheck.  
Would like to be on supplement of   
magnesium chloride due to brain   
health benefit if able.  
- MAGNESIUM BLD  
  
Prescription instructions reviewed   
with patient as applicable. Potential   
red flag symptoms discussed with the   
patient. Reviewed appropriate action   
plan to take if red flag symptoms   
occur. Patient agreeable to treatment   
plan.  
  
UMER Saha.CNP  
[address] [place] [address]  
Phone: [phone]  
Fax: [phone]  
Electronically signed by Yanet Shen APRN.CNP at 2023 1:31   
PM EDT  
documented in this encounter            [facility]  
   
                                        2023 Note     HNO ID: [id number]  
Author: Yanet Shen APRN.CNP  
Service: ?  
Author Type: Nurse Practitioner  
Type: Progress Notes  
Filed: 2023 1:31 PM  
Note Text:  
Chief Complaint  
Patient presents with:  
Physical  
HPI  
Elmira Warner is a 78 year old   
female who presents here today for  
Above Complaints.  
Elmira is an established patient of   
Dr. Michael DO. She is a new   
patient  
to me today.  
Concerns today...  
Routine medicare wellness exam and   
review of chronic conditions.  
HPI:  
HTN --  
She states compliant with current   
blood pressure medication(s): 2.5 mg  
daily -- cutting tablet in half as   
instructed by PCP She does check BP   
at  
home. Average home readings:   
120s/80s. Does report elevated a few   
days ago  
to 160/90s but contributes this to   
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   
day  
which is strenuous activity for her.  
She denies chest pain, shortness of   
breath, palpitations, dizziness, leg  
edema, headaches, or vision changes.  
Last 14 Encounter BP Readings:  
Date: BP:  
2023 122/70  
2022 150/90  
2022 130/86  
2022 160/100  
2022 150/80  
2022 165/80  
2022 162/104  
2022 134/62  
04/15/2022 118/62  
2022 140/90  
2022 150/86  
01/15/2020 142/72  
2015 141/82  
2012 106/56  
HLD --  
Admits to not taking Crestor 5 mg as   
prescribed.  
Does not like to take medications and   
trying supplements instead.  
Asking about starting to take   
magnesium chloride for brain health.   
Read  
about benefits in preventing   
dementia. Wanting to check Mag level.  
Headaches --  
Intermittent sharp pain from mastoid   
bone to collarbone regimen x months.  
Sharp pain to frontal forehead and   
temporal area as well, L side > R   
side.  
Pain lasts up to 1 minute at a time.  
Can happen a few times a day, but can   
also go days without any symptoms.  
Worse with leaning over.  
Pain sensation down back of neck.  
Seems worse when playing games on her   
phone.  
Does report sinus tenderness to L   
side of face, forehead and cheeks.  
Massage therapy to neck does help   
improve.  
Ice also helps.  
Has been to ENT for this and they   
said there is nothing more they can   
do.  
Very independent. Does all her own   
cooking, laundry, cleaning, finances,  
etc on her own.  
Does report limitations with mobility   
due to neck and shoulder surgery  
last year. Unable to lift  
Does use cane around house and out of   
house only when she feels unsteady.  
Last fall was last October (1 year   
ago).  
The pre-visit questionnaire has been   
completed/reviewed/scanned. Concerns  
from the questionnaire: None  
HISTORY REVIEWED: (electronic chart   
appropriately updated)  
- current problem list (as below)  
- medications (as below)  
- allergies (as below)  
- current list of all providers  
PAST MEDICAL HISTORY  
Diagnosis Date  
Abdominal pain, right lower quadrant  
Abdominal pain, right upper quadrant  
Cerebrovascular disease, unspecified  
Coronary atherosclerosis of   
unspecified type of vessel, native or   
graft  
Diverticulosis of colon (without   
mention of hemorrhage)  
Essential hypertension, benign  
Intrinsic asthma with status   
asthmaticus  
Mixed hyperlipidemia  
Osteoporosis, unspecified  
Thoracic or lumbosacral neuritis or   
radiculitis, unspecified  
FAMILY HISTORY  
Problem Relation Age of Onset  
Stroke Father 52  
heart dx,osteoarthritis  
other (rheumatic fever) Sister  
other (rheumatic fever) Sister  
other (heart disease) Sister  
other (heart disease) Brother  
Social History  
Tobacco Use  
Smoking status: Never  
Smokeless tobacco: Never  
Vaping Use  
Vaping Use: Never used  
Substance Use Topics  
Alcohol use: Never  
Drug use: No  
Patient Care Team:  
Bebeto Rodriguez DO as PCP -   
General (Family Medicine)  
SAFETY ASSESSMENT:  
Need help with the phone,   
transportation, shopping, preparing   
meals,  
housework, laundry, medications or   
managing money? No  
Home have rugs in the hallway, lack   
of grab bars in the bathroom, lack of  
handrails on the stairs or have poor   
lighting? No  
Mini-Cog cognitive screen: Normal  
PHQ-2 screen ( little interest or   
pleasure in doing things  AND   
 feeling  
down, depressed or hopeless ):   
Negative  
PHYSICAL EXAMINATION:  
/70   Pulse 84   Resp 16   Ht   
160 cm (5' 3 )   Wt 77 kg (169 lb  
12.8 oz)   SpO2 97%   BMI 30.08 kg/m?  
BMI 30.08 kg/(m2)  
General: alert and appropriate, in no   
distress, ambulates and transfers  
well, well-hydrated, well nourished  
Skin: skin color, texture, turgor   
normal, no rashes or lesions noted  
Head: normocephalic, no abnormality   
or lesion noted  
Ears: external ears normal without   
preauricular LNs or mastoid   
tenderness,  
canals clear, TM's normal, hearing   
grossly normal  
Nose: septum midline without erythema   
or rhinorrhea  
Oropharynx: moist mucous membranes.   
no tonsillar hypertrophy/exudate.  
uvula midline and pharynx   
non-erythematous, lips, teeth and   
gums are  
without obvious lesion  
Respiratory: breathing non-labored,   
lung fields clear to auscultation   
with  
good air exchang (more content not   
included)...                            [facility]  
   
                                                    10- Miscellaneous   
Notes                                   Formatting of this note might be   
different from the original.  
Lov--22  
  
Nov--23  
  
Last refill--23 90 with 1   
refill  
  
Last labs--22  
  
  
Electronically signed by Dianna Yu LPN at 10/25/2023 10:27 AM EDT  
Formatting of this note is different   
from the original.  
Patient has been identified by name   
and date of birth: Yes  
  
Requested Prescriptions  
  
Pending Prescriptions Disp Refills  
lisinopril (ZESTRIL) 5 mg tablet 90   
tablet 1  
Sig: Take 1 tablet by mouth once   
daily.  
  
RX INSTRUCTIONS:  
Patient aware [facility]. No need to notify   
patient.  
  
Anahy Chaparro  
Electronically signed by Anahy Chaparro   
at 10/25/2023 9:23 AM EDT  
documented in this encounter            [facility]  
   
                                                    2023 History of   
Present illness Narrative               Formatting of this note is different   
from the original.  
Images from the original note were   
not included.  
  
[facility]   
ORTHOPEDICS AND SPORTS MEDICINE  
[address] [place] [address]  
[address] [place] [id number]  
Dept: [phone]  
Dept Fax: [phone]  
  
Elmira MUÑOZ Timmy  
1945  
[id number]  
2023  
  
Problem List:  
  
ACDF C4-7, 22  
Neck pain  
Cervical radiculopathy  
Cervical myelopathy  
Cervical spondylosis  
Grade 1 spondylolisthesis at C7-T1  
  
Diagnoses:  
(M47.812) Cervical spondylosis  
  
(M54.2) Neck pain  
  
(Z98.1) Status post cervical spinal   
fusion  
  
Chief Complaint  
Patient presents with  
Neck Pain  
0/10  
Arm Pain  
R 1/10  
  
HPI:  
Elmira is a 78 y.o. female who is here   
today for 12-month PO ACDF C4-7,   
22 .  
  
Current Symptoms:  
1) occasionally pain/pressure on left   
side of neck  
2) occasional numbness and tingling   
in her upper extremities  
  
Changes since last visit:  
1) No falls since 2022  
  
Aggravating factors:  
1) Nothing  
  
Alleviating Factors:  
1) Tylenol  
  
Current Treatment:  
1) ACDF C4-7 DOS: 2022  
2) Tylenol  
  
Review of Systems  
  
Allergies  
Allergen Reactions  
Iodine GI intolerance, Nausea And   
Vomiting and Other  
Other reaction(s): GI Intolerance,   
Other (See Comments), Other: See   
Comments  
Other reaction(s): GI Intolerance, GI   
Upset, Other: See Comments  
Other reaction(s): Other: See   
Comments  
sick /headaches  
sick /headaches  
Other reaction(s): Other: See   
Comments  
sick /headaches  
sick /headaches  
Other reaction(s): Other: See   
Comments  
sick /headaches  
Other reaction(s): Other: See   
Comments  
sick /headaches  
  
Meperidine GI intolerance, Nausea And   
Vomiting, Other and Vomiting Only  
Other reaction(s): AOF, GI   
Intolerance, Upset Stomach, Vomiting  
Other reaction(s): GI Intolerance, GI   
Upset, Other - comment required,   
Vomiting  
Other reaction(s): Other - comment   
required  
Terrible headache after demerol after   
myelogram  
Terrible headache after demerol after   
myelogram  
Other reaction(s): Vomiting  
migraine  
migraine  
Terrible headache after demerol after   
myelogram  
Other reaction(s): Other - comment   
required  
Terrible headache after demerol after   
myelogram  
Other reaction(s): Vomiting  
migraine  
migraine  
Other reaction(s): Other - comment   
required  
Terrible headache after demerol after   
myelogram  
Other reaction(s): Vomiting  
migraine  
Other reaction(s): Other - comment   
required  
Terrible headache after demerol after   
myelogram  
Terrible headache after demerol after   
myelogram  
Other reaction(s): Vomiting  
migraine  
migraine  
Terrible headache after demerol after   
myelogram  
migraine  
  
Meperidine Hcl Nausea And Vomiting  
Penicillins Hives, Rash and Swelling  
Other reaction(s): AOF, Hives  
Swelling in throat, lips, mouth,   
hands  
throat and mouth swelling  
Swelling in throat, lips, mouth,   
hands  
throat and mouth swelling  
throat and mouth swelling  
Swelling in throat, lips, mouth,   
hands  
Swelling in throat, lips, mouth,   
hands  
throat and mouth swelling  
  
Atorvastatin  
Other reaction(s): Myalgia  
Other reaction(s): Myalgia, Myalgia  
Other reaction(s): Myalgia  
  
Iodinated Contrast Media Nausea And   
Vomiting  
Other reaction(s): Other - comment   
required  
Other reaction(s): Other - comment   
required  
Doesn't know what happened, but has   
done okay with premed with heart   
caths  
Doesn't know what happened, but has   
done okay with premed with heart   
caths  
Other reaction(s): Other - comment   
required  
Doesn't know what happened, but has   
done okay with premed with heart   
caths  
Doesn't know what happened, but has   
done okay with premed with heart   
caths  
  
Shellfish-Derived Products  
Tape Hives  
Latex Itching and Rash  
  
Current Outpatient Medications  
Medication Sig Dispense Refill  
albuterol (2.5 MG/3ML) 0.083%   
nebulizer solution 2.5 mg.  
ascorbic acid (Vitamin C) 1000 MG   
tablet Take by mouth.  
aspirin 81 MG chewable tablet Chew 81   
mg.  
Aspirin-Calcium Carbonate  MG   
tablet Take 81 mg by mouth.  
bisoprolol (Zebeta) 5 MG tablet Take   
5 mg by mouth.  
CALCIUM CARB-CHOLECALCIFEROL PO Take   
by mouth.  
Flaxseed, Linseed, (Flax Seed Oil)   
1000 MG capsule Take 1 capsule by   
mouth daily.  
fluocinolone (DermOtic) 0.01 % ear   
drops Administer 4 drops into ears   
Monday, Wednesday, Friday Start   
22.  
lisinopril 2.5 MG tablet Take 2.5 mg   
by mouth daily.  
Multiple Vitamins-Minerals (Centrum   
Silver 50+Women) tablet Take by   
mouth.  
naproxen (Naprosyn) 500 MG tablet   
Take 1 tablet by mouth twice daily as   
needed (for pain/inflammation) for up   
to 14 days. Take with food.  
nitroglycerin (Nitrostat) 0.4 MG SL   
tablet nitroglycerin 0.4 mg   
sublingual tablet  
Potassium Gluconate 2.5 MEQ tablet   
Take 1 tablet by mouth daily.  
Vitamin A (beta carotene) 3 MG (23133   
UT) tablet Take by mouth.  
  
No current facility-administered   
medications for this visit.  
  
Past Medical History:  
Diagnosis Date  
Anxiety  
Arthritis  
Asthma  
CAD (coronary artery disease)   
7 stents total, HX MI  
History of blood transfusion  
remote  
Hyperlipidemia  
Hypertension  
PONV (postoperative nausea and   
vomiting)  
Prolonged emergence from general   
anesthesia  
  
Past Surgical History:  
Procedure Laterality Date  
APPENDECTOMY  
CARDIAC SURGERY   
cardiac stents 7 total  
ECTOPIC PREGNANCY SURGERY  
EYE SURGERY Bilateral  
cataracts td and one re do  
HYSTERECTOMY   
left ovary  
  
Social History  
  
Socioeconomic History  
Marital status:   
Spouse name: Not on file  
Number of children: Not on file  
Years of education: Not on file  
Highest education level: Not on file  
Occupational History  
Not on file  
Tobacco Use  
Smoking status: Never  
Smokeless tobacco: Never  
Substance and Sexual Activity  
Alcohol use: Never  
Drug use: Never  
Sexual activity: Not on file  
Other Topics Concern  
Not on file  
Social History Narrative  
Not on file  
  
Social Determinants of Health  
  
Financial Resource Strain: Not on   
file  
Food Insecurity: Not on file  
Transportation Needs: Not on file  
Physical Activity: Not on file  
Stress: Not on file  
Social Connections: Not on file  
Intimate Partner Violence: Not on   
file  
Housing Stability: Not on file  
  
No family history on file.  
Physical Exam:  
  
Ht 5' 4  (1.626 m)   Wt 170 lb (77.1   
kg)   BMI 29.18 kg/m  
  
SPINE/EXTREMITY:  
  
General: In no apparent distress,   
nonantalgic gait. Anterior cervical   
incision well healed without signs of   
infection.  
  
Upper Extremity Motor:  
Del Bi Tri WE WF Int FF  
Right 5 5 5 5 5 5 5  
Left 5 5 5 5 5 5 5  
  
Upper extremity sensation to light   
touch:  
C5 C6 C7 C8 T1  
Right Intact Intact Intact Intact   
Intact  
Left Intact Intact Intact Intact   
Intact  
  
Radiographic finding:  
  
Radiographs:  
Cervical Spine:  
Date of Exam: 2023  
Views: 4 views of the cervical spine   
(AP and lateral, flexion, extension)  
Findings: Interbody spacers are   
present at C4-5, C5-6, and C6-7 along   
with anterior plate and screws   
spanning C4-7. Alignment appears   
similar compared to prior   
radiographs. There is perhaps some   
subsidence of the interbody spacers.   
Although, this appears relatively   
stable compared to her prior   
radiographs. There is minimal   
widening of the spinous processes   
between C4-5, C5-6, and C6-7 with   
flexion/extension views. Moderate   
degenerative changes throughout the   
remainder of the cervical spine.  
  
Lab Review:  
No labs were reviewed/no labs were   
available for review this visit.  
  
IMPRESSION:  
  
See problem list above.  
  
Elmira is a 78 y.o. female presenting   
today approximately 1 year status   
post ACDF C4-7, 22. Overall, she   
continues to do well from a cervical   
spine perspective. Her preoperative   
radicular symptoms continue to be   
improved. Her myelopathic symptoms   
are also improved as well. She has   
minimal neck pain at this point. I   
independently reviewed her imaging   
from today which show stable   
positioning of her instrumentation.   
We discussed that she may continue   
her activities as tolerated. I   
recommended that she continue her   
home exercises for neck and   
periscapular   
strengthening/stretching. We did not   
make a definite follow-up appointment   
but would be happy to see her back at   
any point in future if her symptoms   
worsen/persist.  
  
PLAN:  
  
Follow up as needed  
  
Follow up if symptoms worsen or fail   
to improve, for Call with questions   
[phone].  
  
Laura Tinajero MD  
  
Dictated using Xcerion Version 2.4  
Proof read however unrecognized voice   
recognition errors may have occurred  
  
  
Electronically signed by Laura Tinajero MD at 2023 9:44 AM EDT  
documented in this encounter            [facility]  
   
                                                    2023 History of   
Present illness Narrative               Formatting of this note is different   
from the original.  
 [address]  
[facility] EAR, NOSE AND THROAT   
PHYSICIANS  
[address]  
MEDICAL OFFICE BUILDING  
[facility] [address]  
Dept: [phone]  
Loc: [phone]  
MD Elmira Plascencia 78 y.o. female  
Patient presents with a chief   
complaint of 3 MO FU EARS (EST PT)  
  
BP (!) 165/103   Pulse (!) 116   Temp   
98.1 F (36.7 C)   Resp 18   Wt 79.4   
kg (175 lb 1.6 oz)   SpO2 97%   BMI   
30.06 kg/m  
  
History of Presenting Illness:  
  
The patient/caregiver reports a   
history of complaint with the   
following features:  
  
Onset: started 2 months ago  
Timing: constant  
Duration: 2 months  
Quality: hoarse voice, periodic left   
neck pains, foreign body sensation   
and throat tightening  
Location: throat  
Severity: pain mild  
Risk factors: cervical spine fusion   
one year ago  
Alleviating factors: nothing makes it   
better  
Aggravating factors: nothing makes it   
worse  
Associated factors: asthma  
  
Review of systems covering 10 systems   
is reviewed and pertinent positives   
and negatives are noted as above.  
  
Past Medical History:  
Diagnosis Date  
Dislocated intraocular lens  
Hypertension  
Myocardial infarction (HCC)  
Stroke (HCC)  
  
Current Outpatient Medications:  
albuterol (PROVENTIL) 2.5 mg /3 mL   
(0.083 %) nebulizer solution, Inhale   
3 mL (2.5 mg total) ., Disp: , Rfl:  
ascorbic acid, vitamin C, (VITAMIN C)   
1000 MG tablet, Take 1 (one) tablet   
(1,000 mg total) by mouth ., Disp: ,   
Rfl:  
aspirin 81 MG EC tablet, Take 1 (one)   
tablet (81 mg total) by mouth daily   
., Disp: , Rfl:  
bisoprolol (ZEBETA) 5 MG tablet, ,   
Disp: , Rfl:  
brimonidine (ALPHAGAN) 0.2 %   
ophthalmic solution, , Disp: , Rfl:  
calcium carbonate-vitamin D3 600   
mg(1,500mg) -200 unit per tablet,   
Take by mouth., Disp: , Rfl:  
cholecalciferol, vitamin D3, 1,000   
unit capsule, Take 1 (one) capsule   
(1,000 Units total) by mouth ., Disp:   
, Rfl:  
clopidogrel (PLAVIX) 75 mg tablet, ,   
Disp: , Rfl:  
coenzyme Q10 10 mg capsule, Take 10   
mg by mouth., Disp: , Rfl:  
famotidine (PEPCID) 40 MG tablet,   
Take 1 (one) tablet (40 mg total) by   
mouth daily ., Disp: 90 tablet, Rfl:   
3  
fluocinolone acetonide oiL 0.01 %   
Drop, Administer 4 drops into ears   
Monday, Wednesday, Friday Start:   
22., Disp: 20 mL, Rfl: 3  
levalbuterol (XOPENEX HFA) 45   
mcg/actuation inhaler, Inhale., Disp:   
, Rfl:  
lisinopril (PRINIVIL,ZESTRIL) 5 MG   
tablet, , Disp: , Rfl:  
magnesium oxide (MAG-OX) 400 mg   
tablet, Take by mouth., Disp: , Rfl:  
ofloxacin (OCUFLOX) 0.3 % ophthalmic   
solution, , Disp: , Rfl:  
potassium chloride, bulk, Powd, Take   
10 mEq by mouth., Disp: , Rfl:  
prednisoLONE acetate (PRED FORTE) 1 %   
ophthalmic suspension, , Disp: , Rfl:  
pyridoxine, vitamin B6, (B-6) 50 MG   
tablet, Take 2 (two) tablets (100 mg   
total) by mouth ., Disp: , Rfl:  
Allergies  
Allergen Reactions  
Adhesive Tape-Silicones Rash  
Blisters and pulls skin off  
Iodine GI Intolerance  
Other reaction(s): Other: See   
Comments  
sick /headaches  
Meperidine GI Intolerance  
Other reaction(s): Other - comment   
required  
Terrible headache after demerol after   
myelogram  
Penicillins Hives, Rash and Swelling  
Swelling in throat, lips, mouth,   
hands  
throat and mouth swelling  
Adhesive Hives  
Atorvastatin  
Other reaction(s): Myalgia  
Ct: Iodinated Contrast- Oral And Iv   
Dye  
Other reaction(s): Other - comment   
required  
Doesn't know what happened, but has   
done okay with premed with heart   
caths  
Influenza Vaccine Tri-Sp -10   
Diarrhea  
vomiting/fever  
Meperidine (Pf)  
Other reaction(s): Vomiting  
migraine  
Shellfish Containing Products Hives  
Latex Itching and Rash  
  
Past Surgical History:  
Procedure Laterality Date  
ANGIOPLASTY  
APPENDECTOMY  
CORONARY ANGIOPLASTY WITH STENT   
PLACEMENT  
HYSTERECTOMY  
TUBAL LIGATION  
  
Social History  
  
Socioeconomic History  
Marital status: Unknown  
Tobacco Use  
Smoking status: Never  
Smokeless tobacco: Never  
Substance and Sexual Activity  
Alcohol use: No  
  
History reviewed. No pertinent family   
history.  
  
PHYSICAL EXAM:  
  
The patient was examined today   
3/9/2023 with findings as follows:  
  
CONSTITUTIONAL:  
General Appearance: well-appearing,   
nontoxic, alert, no acute distress  
Communication: understanding at   
normal conversational tones, mild   
hoarse voicing, speech intelligible  
  
HEAD/FACE:  
Head: atraumatic, normocephalic, no   
lesions  
Facial Inspection: no lesions,   
healthy skin  
Facial Strength: motor strength   
normal, symmetric strength, symmetric   
movement  
Sinuses: no sinus tenderness  
Salivary Glands: no enlargements of   
parotid glands, no tenderness of   
parotid glands, no masses of parotid   
glands, clear salivary flow on   
palpation from Stensen's ducts, no   
duct stones of Stensen's duct, no   
enlargement of submandibular glands,   
no tenderness of submandibular   
glands, no masses of submandibular   
glands, clear salivary flow from   
[place]'s ducts, no stones of   
[place]'s ducts  
Temporomandibular Joint: right   
crepitus with motion with reduced   
anterior dislocation of left, no   
tenderness on palpation, no trismus,   
motion symmetric  
  
EYES:  
Pupils: PERRLA, extra-ocular   
movements intact, no nystagmus,   
sclera white, no redness of eyes, no   
watering of eyes  
  
NOSE:  
Nasal Skin: no lesions, no   
lacerations, no scars  
Nasal Dorsum: symmetric with no   
visible or palpable deformities  
Nasal Tip: normal symmetric nasal   
tip, normal nasal valves  
Nasal Mucosa: normal, pink and moist  
Septum: not markedly deformed,   
midline, no exposed vessels, no   
bleeding, no septal granuloma  
Turbinates: normal size and   
conformation  
Nasopharynx: normal  
  
ORAL CAVITY/MOUTH:  
Lips, teeth, gums: normal lips,   
normal gums, dentition intact, no   
dental pain on palpation  
Oral Mucosa: normal, moist, no   
lesions  
Palate: normal hard palate, normal   
soft palate, symmetric palatal   
elevation  
Floor of Mouth: normal floor of mouth  
Tongue: normal tongue, no lesions, no   
edema, no masses, normal mucosa,   
mobile  
Tonsils: normal tonsils, symmetric,   
no lesions  
Posterior pharynx: normal  
  
HYPOPHARYNX/LARYNX:  
Hypopharynx: normal hypopharynx,   
normal tongue base, normal pyriform   
sinus, normal vallecula  
Larynx: see endoscopy note, normal   
epiglottis, normal false vocal cords,   
normal true vocal cords, normal   
glottic mobility, mild arytenoid   
edema, post-cricoid edema, no   
subglottic stenosis  
  
NECK:  
Neck: no masses, trachea midline,   
normal range of motion, no cysts or   
pits, no tenderness to palpation  
Thyroid: normal thyroid, no   
enlargement, no tenderness, no   
nodules  
  
LYMPH NODES:  
Cervical: no palpable lymph node   
enlargement  
  
SKIN:  
General Appearance: no lesions, warm   
and dry, normal turgor, no bruising  
  
NEUROLOGICAL SYSTEM:  
Orientation: oriented to time,   
oriented to place, oriented to person  
Cranial Nerves: Cranial Nerves II-XII   
intact, normal facial movement  
  
PSYCHIATRIC:  
Mood and affect: normal mood, normal   
affect  
  
FIBEROPTIC NASOPHARYNOGLARYNGOSCOPY   
NOTE ([id number])  
  
PROCEDURE PERFORMED BY: John Kumari MD  
  
PROCEDURE DATE: 3/9/2023  
  
With the patient and/or caregiver's   
consent, the patient is positioned in   
the exam chair. The nasal cavity is   
then prepared with local   
anesthetic/decongestant of 4%   
Lidocaine and 0.05% oxymetazoline.  
  
Using a flexible endoscope the nasal   
cavity beginning on the left side is   
entered. There is normal moist nasal   
septal mucosa. The nasal septum is   
midline. The inferior turbinate is   
normal. The middle turbinate is   
normal. The ethmoid and frontal nasal   
recesses are intact and patent. The   
sphenoid sinus ostea is intact and   
patent. The maxillary sinus ostia is   
intact and patent  
  
The endoscope is then withdrawn and   
the right side is entered. The   
inferior turbinate is normal. The   
middle turbinate is normal. The   
ethmoid and frontal nasal recesses   
are intact and patent. The sphenoid   
sinus ostea is intact and patent. The   
maxillary sinus ostia is intact and   
patent  
  
Examination of nasopharynx shows   
normal adenoid and intact and patent   
eustachian tube orifices.   
Velopharyngeal closure is intact.  
  
Examination of the pharynx reveals   
the lateral and posterior pharyngeal   
wall mucosa is normal moist. Tonsils   
are normal. The tongue base is   
normal. Examination of the   
hypopharynx, vallecula, and pyriform   
sinus reveals normal. The epiglottis   
is normal. Examination of the vocal   
folds reveals normal mucosal and   
mobility bilaterally. The arytenoid   
and post-cricoid mucosa is edematous.   
The visualized portions of the   
subglottis is edematous.  
  
This completed the procedure with the   
patient having tolerated the   
procedure well.  
  
Assessment and Plan:  
  
Her vocal mobility appears to have   
improved. She continues with audible   
wheezing that appears to be due to   
her asthma and ongoing treatment for   
this is advised as coordinated   
through her PCP. I see no laryngeal   
obstruction or subglottic stenosis   
and reassurance is offered.  
  
We have discussed that dysphagia   
after cervical fusion is common. The   
rationale for any prescribed   
radiographic or endoscopic   
evaluation, speech therapy   
evaluation, or other intervention is   
discussed. The patient and/or   
caregiver is advised on the risks of   
dysphagia to include but not limited   
to potential entry of food into the   
lungs with impairment of breathing   
and increased risk of pneumonia,   
nutritional deficit from inadequate   
caloric intake, weight loss, fatigue,   
poor wound healing, and exacerbation   
of other chronic disease processes.   
The symptom management of dysphagia   
specific to the patient's exam   
findings and complaints such as the   
avoidance of problem foods,   
modification of diet, and the   
practice of taking in small   
quantities of foods or thickening of   
liquids, alternating solids and   
liquids, the use of carbonation and   
temperature for improved sensory   
input to reduce aspiration risk is   
discussed. The patient and/or   
caregiver is able to state an   
understanding of these   
recommendations and is agreeable to   
the treatment plan.  
  
1. Hoarse  
  
2. Pharyngoesophageal dysphagia  
  
3. Asthma, unspecified asthma   
severity, unspecified whether   
complicated, unspecified whether   
persistent  
  
  
Return if symptoms worsen or fail to   
improve.  
  
The patient and/or caregiver is to   
notify the office if no improvement   
or worsening of symptoms is noted   
prior to the scheduled follow-up for   
sooner evaluation. The patient and/or   
caregiver is able to state an   
understanding of these   
recommendations and is agreeable to   
the treatment plan.  
  
--John Kumari MD on 3/9/2023   
at 6:04 PM  
An electronic signature was used to   
authenticate this note.  
Electronically signed by John Kumari MD at 2023 6:04 PM EST  
Formatting of this note might be   
different from the original.  
Review of Systems  
Constitutional: Negative.  
HENT: Positive for ear pain, sore   
throat and voice change.  
Eyes: Negative.  
Respiratory: Positive for shortness   
of breath and wheezing.  
Gastrointestinal: Negative.  
Endocrine: Negative.  
Genitourinary: Negative.  
Musculoskeletal: Positive for gait   
problem.  
Skin: Negative.  
Allergic/Immunologic: Positive for   
food allergies.  
Hematological: Negative. Does not   
bruise/bleed easily.  
Psychiatric/Behavioral: Negative.  
  
Electronically signed by Rhianna Garduno MA at 2023 6:04 PM   
EST  
documented in this encounter            [facility]  
   
                                                    2022 History of   
Present illness Narrative               Formatting of this note is different   
from the original.  
Images from the original note were   
not included.  
  
Kinjal Carmen MD  
Interventional Cardiology  
[address] [place] [address]  
[phone]  
  
Chief Complaint  
Patient presents with:  
Established Patient Follow-Up  
  
HISTORY OF PRESENT ILLNESS:  
Ms. Warner is a 77 year old female   
seen in my office for follow-up prior   
history of severe coronary artery   
disease with myocardial infarction in   
 patient at that time had a   
drug-eluting stent to the LAD   
circumflex and the right coronary   
artery  
Repeat angioplasty in  to the   
right coronary artery history of   
stroke hyperlipidemia and   
hypertension  
Doing well from the cardiac point of   
view asymptomatic denies chest pain   
or shortness of breath  
  
Cardiac Risk Factors  
age (male over 45, female over 55),   
hyperlipidemia, hypertension, family   
history of CAD  
  
PAST MEDICAL HISTORY  
Diagnosis Date  
Abdominal pain, right lower quadrant  
Abdominal pain, right upper quadrant  
Cerebrovascular disease, unspecified  
Coronary atherosclerosis of   
unspecified type of vessel, native or   
graft  
Diverticulosis of colon (without   
mention of hemorrhage)  
Essential hypertension, benign  
Intrinsic asthma with status   
asthmaticus  
Mixed hyperlipidemia  
Osteoporosis, unspecified  
Thoracic or lumbosacral neuritis or   
radiculitis, unspecified  
  
PAST SURGICAL HISTORY  
Procedure Laterality Date  
ANGIOPLASTY times 3  
7 stents  
APPENDECTOMY  
COLONOSCOPY FLX DX W/COLLJ SPEC WHEN   
PFRMD 12 years ago  
Colonoscopy  
COLONOSCOPY FLX DX W/COLLJ SPEC WHEN   
PFRMD 2012  
Colonoscopy  
HYSTERECTOMY HX  
  
FAMILY HISTORY  
Problem Relation Age of Onset  
Stroke Father 52  
heart dx,osteoarthritis  
other (rheumatic fever) Sister  
other (rheumatic fever) Sister  
other (heart disease) Sister  
other (heart disease) Brother  
  
Social History  
  
Tobacco Use  
Smoking status: Never  
Smokeless tobacco: Never  
Vaping Use  
Vaping Use: Never used  
Substance Use Topics  
Alcohol use: Never  
Drug use: No  
  
ALLERGIES  
Allergen Reactions  
Adhesive Tape-Silic* Rash  
Demerol [Meperidine* Vomiting  
migraine  
Influenza Vaccine T* Diarrhea  
vomiting/fever  
Iodine Other: See Comments  
sick /headaches  
Penicillins Hives  
throat and mouth swelling  
Shellfish Hives  
  
Medications:  
Current Outpatient Medications  
Medication Sig Dispense Refill  
rosuvastatin (CRESTOR) 5 mg tablet   
Take 1 tablet by mouth daily at   
bedtime. 90 tablet 3  
lisinopril (ZESTRIL, PRINIVIL) 10 mg   
tablet Take 1 tablet by mouth once   
daily. 90 tablet 0  
albuterol (PROVENTIL) 2.5 mg /3 mL   
(0.083 %) nebulizer solution Use 3 mL   
via nebulizer every 6 hours as   
needed. 100 Vial 3  
meloxicam (MOBIC) 15 mg tablet Take 1   
tablet by mouth once daily. Take with   
food. 30 tablet 2  
TUMERIC-GING-OLIVE-OREG-CAPRYL ORAL   
Take by mouth.  
Bisacodyl (DULCOLAX) 5 mg tab Use as   
directed for Miralax / Gatorade Bowel   
Prep Kit 4 tablet 0  
levalbuterol tartrate HFA (XOPENEX   
HFA) 45 mcg/actuation inhaler Inhale   
2 Puffs as instructed every 6 hours   
as needed. 1 Inhaler 5  
Nebulizers ([employer] GO NEBULIZER) 1   
Each as directed. Portable Nebulizer   
with tubing, Dx: mild persistent   
asthma 1 Each 0  
Lactobac no.41/Bifidobact no.7   
(PROBIOTIC-10 ORAL) Take by mouth.  
nitroglycerin sublingual (NITROSTAT)   
0.4 mg SL tablet Dissolve 1 tablet   
under the tongue every 5 minutes as   
needed for Chest Pain. Max of 3   
doses. If no relief, call 911. 1   
Bottle of 25 1  
MULTIVITAMIN (VITAMIN A DAY ORAL)   
Take by mouth.  
Cholecalciferol, Vitamin D3, 25 mcg   
(1,000 unit) cap Take 1,000 Units by   
mouth once daily.  
Ascorbic Acid 1,000 mg tablet Take   
1,000 mg by mouth once daily.  
Aspirin 81 mg Tab Take 81 mg by mouth   
once daily.  
  
GLUC COLBERT/CHONDRO COLBERT A/VIT C/MN   
(GLUCOSAMINE 1500 COMPLEX ORAL) Take   
by mouth.  
  
CALCIUM CARBONATE/VITAMIN D3 (CALCIUM   
+ D ORAL) Take by mouth.  
vit B complex no.12/niacin,B3,   
(VITAMIN B COMPLEX NO.12-NIACIN ORAL)   
Take by mouth.  
  
No current facility-administered   
medications for this visit.  
  
  
Review of Systems  
Constitutional: Negative for chills,   
diaphoresis, fever, malaise/fatigue   
and weight loss.  
HENT: Negative for congestion, ear   
discharge, ear pain, hearing loss,   
nosebleeds, sinus pain, sore throat   
and tinnitus.  
Eyes: Negative for blurred vision,   
double vision, photophobia, pain,   
discharge and redness.  
Respiratory: Negative for cough,   
hemoptysis, sputum production,   
shortness of breath, wheezing and   
stridor.  
Cardiovascular: Negative for chest   
pain, palpitations, orthopnea,   
claudication, leg swelling and PND.  
Gastrointestinal: Negative for   
abdominal pain, blood in stool,   
constipation, diarrhea, heartburn,   
melena, nausea and vomiting.  
Genitourinary: Negative for dysuria,   
flank pain, frequency, hematuria and   
urgency.  
Musculoskeletal: Negative for back   
pain, falls, joint pain, myalgias and   
neck pain.  
Skin: Negative for itching and rash.  
Neurological: Negative for dizziness,   
tingling, tremors, sensory change,   
speech change, focal weakness,   
seizures, loss of consciousness,   
weakness and headaches.  
Endo/Heme/Allergies: Negative for   
environmental allergies and   
polydipsia. Does not bruise/bleed   
easily.  
Psychiatric/Behavioral: Negative for   
depression, hallucinations, memory   
loss, substance abuse and suicidal   
ideas. The patient is not   
nervous/anxious and does not have   
insomnia.  
  
Physical Examination:  
Vitals:/90   Pulse 130   Wt 170   
lb (77.1kg)  
  
  
BP w/Orthostatic Vitals  
  
Date and Time Orthostatic BP   
Orthostatic Pulse BP Pulse BP   
Position BP Site BP Cuff Size  
22 1541 -- -- 150/90 130   
Sitting Left Arm Large Adult  
  
  
  
Last 2 Encounter Wt Readings:  
Date: Wt:  
2022 77.1 kg (170 lb)  
2022 76.7 kg (169 lb 3.2 oz)  
  
Physical Exam  
Constitutional:  
General: She is not in acute   
distress.  
Appearance: She is not diaphoretic.  
HENT:  
Head: Normocephalic and atraumatic.  
Right Ear: External ear normal.  
Left Ear: External ear normal.  
Nose: Nose normal.  
Mouth/Throat:  
Pharynx: Oropharynx is clear.  
Eyes:  
General:  
Right eye: No discharge.  
Left eye: No discharge.  
Conjunctiva/sclera: Conjunctivae   
normal.  
Pupils: Pupils are equal, round, and   
reactive to light.  
Cardiovascular:  
Rate and Rhythm: Normal rate and   
regular rhythm.  
Heart sounds: Normal heart sounds, S1   
normal and S2 normal. No murmur   
heard.  
No friction rub. No gallop. No S3 or   
S4 sounds.  
Pulmonary:  
Effort: Pulmonary effort is normal.   
No respiratory distress.  
Breath sounds: Normal breath sounds.   
No wheezing or rales.  
Chest:  
Chest wall: No tenderness.  
Musculoskeletal:  
General: Normal range of motion.  
Cervical back: Normal range of motion   
and neck supple.  
Skin:  
General: Skin is warm and dry.  
Neurological:  
Mental Status: She is alert and   
oriented to person, place, and time.  
Psychiatric:  
Mood and Affect: Mood normal.  
Thought Content: Thought content   
normal.  
  
Pertinent Labs:  
CBC:  
Hemoglobin (g/dL)  
Date Value  
2022 11.9  
2021 12.4  
  
Hematocrit (%)  
Date Value  
2022 36.2  
2021 37.6  
  
WBC (k/uL)  
Date Value  
2022 9.24  
2021 8.03  
  
Platelet Count (k/uL)  
Date Value  
2022 221  
2021 250  
  
BMP:  
Glucose (mg/dL)  
Date Value  
2022 95  
2021 102  
  
Potassium (mmol/L)  
Date Value  
2022 4.1  
2021 3.9  
  
Sodium (mmol/L)  
Date Value  
2022 138  
2021 140  
  
Chloride (mmol/L)  
Date Value  
2022 106  
2021 105  
  
CO2 (mmol/L)  
Date Value  
2022 25  
2021 24  
  
Creatinine (mg/dL)  
Date Value  
2022 0.90  
2021 1.03  
  
BUN (mg/dL)  
Date Value  
2022 23  
2021 19  
  
Anion Gap (mmol/L)  
Date Value  
2022 7  
2021 11  
  
Calcium (mg/dL)  
Date Value  
2021 10.3  
  
Calcium, Total (mg/dL)  
Date Value  
2022 10.0  
  
INR:  
Lipid Profile:  
Cholesterol, Total  
Date Value Ref Range Status  
2021 166 <200 mg/dL Final  
Comment:  
<200 mg/dL, Desirable  
200-239 mg/dL, Borderline high  
>239 mg/dL, High  
  
  
HDL Cholesterol  
Date Value Ref Range Status  
2021 36 (L) >39 mg/dL Final  
Comment:  
40-59 mg/dL, Acceptable  
>59 mg/dL, High: Negative risk factor   
for coronary heart disease  
<40 mg/dL, Low: Positive risk factor   
for coronary heart disease  
  
  
LDL Cholesterol  
Date Value Ref Range Status  
2021 109 (H) <100 mg/dL Final  
Comment:  
<100 mg/dL, Optimal  
100-129 mg/dL, Near optimal/above   
optimal  
130-159 mg/dL, Borderline high  
160-189 mg/dL, High  
>189 mg/dL, Very high  
Secondary prevention optimal LDL   
Cholesterol levels are recommended to   
be < 70  
mg/dL  
  
  
Triglyceride  
Date Value Ref Range Status  
2021 103 <150 mg/dL Final  
Comment:  
<150 mg/dL, Normal  
150-199 mg/dL, Borderline high  
200-499 mg/dL, High  
>499 mg/dL, Very high  
  
  
Hemoglobin A1C: No results found for:   
HGBA1C  
TSH: No results found for: TSHREFL  
  
Prior Cardiac Testing  
Stress test  
  
Assessment and Plan:  
tress test 77 years old female   
patient prior history of coronary   
artery disease and multiple stent   
doing well from the cardiac point of   
view  
  
Hypertension  
Lisinopril was increased up to 10 mg   
if her blood pressures remain 130/90   
or higher consider   
hydrochlorothiazide in addition to   
her lisinopril  
2. Coronary artery disease  
Stable with no angina  
  
Follow up planning:  
One year  
  
Electronically signed by Kinjal Carmen MD on 2022, 4:00   
PM  
  
The above note was partially created   
using a dictation recognition   
software. A reasonable attempt has   
been made to correct any errors.  
  
Electronically signed by Kinjal Carmen MD at 2022 4:03   
PM EST  
documented in this encounter            [facility]  
   
                                        2022 Instructions   
  
  
Nadia Mccormick - 2022 12:27 PM   
ESTFormatting of this note might be   
different from the original.  
When you see cardiology ,   
discuss your lisinopril with him and   
any concern with increased   
asthma/wheezing episodes and what he   
would suggest to change to if   
necessary  
  
Continue to check blood pressures at   
home daily and with symptoms  
  
  
Electronically signed by Nadia Mccormick at 2022 12:27 PM EST  
  
documented in this encounter            [facility]  
   
                                                    2022 History of   
Present illness Narrative               Formatting of this note is different   
from the original.  
Chief Complaint  
Patient presents with:  
Blood Pressure Check  
  
HPI  
Elmira Warner is a 77 year old   
female who presents here today for   
Above Complaints..  
  
Today:  
Lisinopril was increased on ,   
following up on this today.  
At PT yesterday during exercises   
became SOB, SpO2 was 93%, BP was   
160/100-recovered in 1.5 minutes to   
96%. Did not check her heart rate at   
that time, but has felt irregular at   
times.  
Feels like her asthma may be acting   
up with the weather.  
  
Past medical history, appointments,   
medications, allergies reviewed.  
  
Previous Medical History  
PAST MEDICAL HISTORY  
Diagnosis Date  
Abdominal pain, right lower quadrant  
Abdominal pain, right upper quadrant  
Cerebrovascular disease, unspecified  
Coronary atherosclerosis of   
unspecified type of vessel, native or   
graft  
Diverticulosis of colon (without   
mention of hemorrhage)  
Essential hypertension, benign  
Intrinsic asthma with status   
asthmaticus  
Mixed hyperlipidemia  
Osteoporosis, unspecified  
Thoracic or lumbosacral neuritis or   
radiculitis, unspecified  
  
Previous Surgical History  
PAST SURGICAL HISTORY  
Procedure Laterality Date  
ANGIOPLASTY times 3  
7 stents  
APPENDECTOMY  
COLONOSCOPY FLX DX W/COLLJ SPEC WHEN   
PFRMD 12 years ago  
Colonoscopy  
COLONOSCOPY FLX DX W/COLLJ SPEC WHEN   
PFRMD 2012  
Colonoscopy  
HYSTERECTOMY HX  
  
Family History  
FAMILY HISTORY  
Problem Relation Age of Onset  
Stroke Father 52  
heart dx,osteoarthritis  
other (rheumatic fever) Sister  
other (rheumatic fever) Sister  
other (heart disease) Sister  
other (heart disease) Brother  
  
Patient Allergies  
ALLERGIES  
Allergen Reactions  
Adhesive Tape-Silic* Rash  
Demerol [Meperidine* Vomiting  
migraine  
Influenza Vaccine T* Diarrhea  
vomiting/fever  
Iodine Other: See Comments  
sick /headaches  
Penicillins Hives  
throat and mouth swelling  
Shellfish Hives  
  
Current Medications  
Current Outpatient Medications on   
File Prior to Visit  
Medication Sig  
rosuvastatin (CRESTOR) 5 mg tablet   
Take 1 tablet by mouth daily at   
bedtime.  
lisinopril (ZESTRIL, PRINIVIL) 10 mg   
tablet Take 1 tablet by mouth once   
daily.  
albuterol (PROVENTIL) 2.5 mg /3 mL   
(0.083 %) nebulizer solution Use 3 mL   
via nebulizer every 6 hours as   
needed.  
meloxicam (MOBIC) 15 mg tablet Take 1   
tablet by mouth once daily. Take with   
food.  
vit B complex no.12/niacin,B3,   
(VITAMIN B COMPLEX NO.12-NIACIN ORAL)   
Take by mouth.  
TUMERIC-GING-OLIVE-OREG-CAPRYL ORAL   
Take by mouth.  
Bisacodyl (DULCOLAX) 5 mg tab Use as   
directed for Miralax / Gatorade Bowel   
Prep Kit  
levalbuterol tartrate HFA (XOPENEX   
HFA) 45 mcg/actuation inhaler Inhale   
2 Puffs as instructed every 6 hours   
as needed.  
Nebulizers ([employer] GO NEBULIZER) 1   
Each as directed. Portable Nebulizer   
with tubing, Dx: mild persistent   
asthma  
Lactobac no.41/Bifidobact no.7   
(PROBIOTIC-10 ORAL) Take by mouth.  
nitroglycerin sublingual (NITROSTAT)   
0.4 mg SL tablet Dissolve 1 tablet   
under the tongue every 5 minutes as   
needed for Chest Pain. Max of 3   
doses. If no relief, call 911.  
MULTIVITAMIN (VITAMIN A DAY ORAL)   
Take by mouth.  
Cholecalciferol, Vitamin D3, 25 mcg   
(1,000 unit) cap Take 1,000 Units by   
mouth once daily.  
Ascorbic Acid 1,000 mg tablet Take   
1,000 mg by mouth once daily.  
Aspirin 81 mg Tab Take 81 mg by mouth   
once daily.  
  
GLUC COLBERT/CHONDRO COLBERT A/VIT C/MN   
(GLUCOSAMINE 1500 COMPLEX ORAL) Take   
by mouth.  
  
CALCIUM CARBONATE/VITAMIN D3 (CALCIUM   
+ D ORAL) Take by mouth.  
oxyCODONE-acetaminophen (PERCOCET)   
5-325 mg tablet as needed for pain.   
(Patient not taking: Reported on   
2022)  
zinc sulfate (ZINC-15 ORAL) Take by   
mouth. (Patient not taking: Reported   
on 2022)  
ubidecarenone Q-10 (COENZYME Q-10) 10   
mg cap Take 10 mg by mouth once   
daily. (Patient not taking: No sig   
reported)  
magnesium oxide (MAG-OX) 400 mg   
(241.3 mg magnesium) tablet Take 400   
mg by mouth once daily. (Patient not   
taking: Reported on 2022)  
Potassium 99 mg tab Take 1 tablet by   
mouth once daily. (Patient not   
taking: No sig reported)  
potassium chloride (K-TAB) 10 mEq   
tablet Take 1 tablet by mouth daily   
with breakfast. (Patient not taking:   
Reported on 2022)  
  
No current facility-administered   
medications on file prior to visit.  
  
Social History  
Social History  
  
Tobacco Use  
Smoking status: Never  
Smokeless tobacco: Never  
Vaping Use  
Vaping Use: Never used  
Substance Use Topics  
Alcohol use: Never  
Drug use: No  
  
Review of Symptoms  
REVIEW OF SYSTEMS  
See HPI, otherwise negative  
  
EXAM:  
/86 (BP Site: Left Arm, BP   
Position: Sitting, BP Cuff Size:   
Regular Adult)   Pulse 81   Resp 18     
Wt 76.7 kg (169 lb 3.2 oz)   SpO2 98%   
  BMI 29.97 kg/m  
  
General Appearance: Well appearing,   
alert, in no acute distress,   
well-hydrated, well nourished..  
Lungs: Lungs clear to auscultation.   
No wheezing, rhonchi, rales..  
Heart: RRR without murmur, gallop, or   
rubs. No ectopy.  
  
Health Maintenance List  
COVID-19 VACCINE(1) Never done  
PNEUMOCOCCAL: 65+(1 - PCV) Never done  
SPIROMETRY Never done  
HEPATITIS C SCREENING Never done  
BP CONTROLLED (<130/80) Never done  
SHINGRIX VACCINE(1 of 2) Never done  
ADVANCE DIRECTIVE DISCUSSION Never   
done  
DEPRESSION ASSESSMENT Never done  
INFLUENZA(1) Never done  
LDL CHOLESTEROL due on 2022  
DTAP,TDAP,TD(1 - Tdap) due on   
04/15/2023  
ANNUAL PCP TEAM CHRONIC DISEASE VISIT   
due on 2023  
DIABETES SCREEN due on 2025  
BONE DENSITY Completed  
  
Data reviewed  
Previous records, office notes  
  
ASSESSMENT/PLAN:  
1. Essential hypertension - ICD9:   
401.9, ICD10: I10  
Controlled today.  
Was apparently elevated 2 days ago at   
PT.  
Patient does not take her full dose   
of lisinopril every day, encouraged   
her to do so.  
States increased asthma sx recently,   
? R/t lisinopril increase?  
She is seeing her cardiologist in 5   
days.  
  
Maria E Guzmán APRN.CNP  
  
  
  
Electronically signed by Maria E Guzmán APRN.CNP at 2022 7:09   
PM EST  
documented in this encounter            [facility]  
   
                                                    2022 History of   
Present illness Narrative               Formatting of this note is different   
from the original.  
  
  
Herrera Mendoza MD  
Department of Orthopaedics  
Orthopaedics  
[address]  
[facility] [address]  
Dept: [phone]  
Dept Fax: [phone]  
  
2022  
  
CHIEF COMPLAINT: New Patient and Pain   
of the Right Shoulder  
  
HPI  
Patient referred by Kenya Guzmán.   
Patient fell 3 weeks ago and c/o   
pain, limited ROM. She had XR taken   
on  and was referred to Physical   
Therapy. PT is helping a lot, her   
pain is down to a 1 and her ROM has   
improved.  
  
AMB ROOMING INTAKE FLOWSHEET DATA  
Risk Screening  
Do you have concerns about personal   
safety or safety in the home?: No  
Pain  
Pain Level: 1  
Pain Location: Shoulder-Right  
Description: Other: See comment   
(pinching)  
Duration Amount of Time: 3  
Duration Units: Weeks  
Frequency: Continuous  
Intervention/Comfort measure:   
Reposition, Relaxation, Other: See   
comment (Physical Therapy)  
Patient presents with:  
Right Shoulder - New Patient, Pain  
  
ASSESSMENT:  
S49.91XD Injury of right shoulder,   
subsequent encounter  
W19.XXXD Fall, subsequent encounter  
M54.2, G89.29 Neck pain, chronic  
  
PLAN:  
She's doing much better. She'll just   
continue with her PT for now.  
  
FOLLOW UP INSTRUCTIONS:  
As needed.  
  
Ms. Elmira Warner was advised as   
to contrast therapies and/or to take   
analgesics/anti-inflammatories as   
needed and all contraindications were   
reviewed.  
  
OBJECTIVE:  
Ms. Elmira Warner is a pleasant   
77 year old in no apparent distress.  
Gen:There were no vitals taken for   
this visit.  
  
nl development, non obese, no   
deformities  
ENT: Normocephalic, normal hearing,   
moist mucosa  
CV: Pulses:Radial= 2+ and symmetric,   
capillary refill < 2 secs, no   
peripheral edema/varicosities  
Skin: no rash, bruising or lesions.   
Good turgor.  
Psych: cooperative and appropriate,   
alert and oriented x 3, good mood and   
affect.  
Musculoskeletal:  
Supple range of motion of the   
cervical spine without pain. Spurling   
signs are negative. No atrophy of the   
deltoid and shoulder musculature.   
Right shoulder is nontender to   
palpation over the SC joint, clavicle   
and AC joint. No tenderness to   
palpation over the posterior   
shoulder, mild tenderness at the   
anterior lateral corner of the   
shoulder and greater tuberosity. Non   
painful at the bicipital groove and   
coracoid. Active range of motion is   
170 of forward elevation, 60 external   
rotation, and internal rotation to   
the upper lumbar spine. Passive range   
of motion is symmetrical,   
respectively. No laxity with anterior   
and posterior stress. Neg Neer and   
Khan impingement signs. 5/5   
strength with supraspinatus,   
infraspinatus and subscapularis.   
Sensation is intact in the axillary,   
radial, median and ulnar nerve   
distribution  
  
IMAGING:  
IMPRESSION:  
  
Cervical spine degenerative and   
postsurgical findings.  
  
Right humerus and shoulder are   
unremarkable.  
  
: PSCB  
Transcribe Date/Time: Nov 15 2022   
9:53A  
  
Dictated by : YASEMIN RO MD  
  
This examination was interpreted and   
the report reviewed and  
electronically signed by:  
YASEMIN RO MD on Nov 15 2022 10:11AM   
EST  
  
Results-Findings  
  
* * *Final Report* * *  
  
DATE OF EXAM: 2022 2:03PM  
  
WOX 5253 - XR SHLDR >/=3V AP/JAGJIT   
AP/OTHR RT / ACCESSION # [id number]  
  
PROCEDURE REASON: multiple diagnoses  
  
* * * * Physician Interpretation * *   
* *  
  
Right shoulder and humerus, and   
cervical spine  
  
HISTORY: 77 years old  
Clinical information: Injury of right   
shoulder, subsequent encounter  
Fall, subsequent encounter Neck pain,   
chronic Neck pain, chronic  
Right posterior shoulder pain   
following a fall x 1 week ago   
(accession  
[id number]), Right lateral humerus   
pain following a fall x 1 week ago  
(accession [id number]), Hx of surgery,   
Pt fell one week ago with pain at  
the base of the neck (accession   
[id number])  
TECHNIQUE:  
Images: XR SHLDR >/=3V AP/JAGJIT AP/OTHR   
RT, XR HUMERUS 2V AP/LAT RT, XR  
CERVICAL 4V AP/LAT/OBL  
Comparison: None.  
  
RESULT:  
  
Findings:  
Cervical spine: No radiopaque spacers   
at the C4-5, C5-6 and C6-7 disc  
spaces. Associated anterior plate and   
screw fixation extending from C4  
to C7. There is about 2 mm   
retrolisthesis at C4-5 and 1 mm  
anterolisthesis at C5-6. There is 2   
mm anterolisthesis at C7-T1.  
Multilevel bilateral facet   
hypertrophic degenerative spurring   
There is  
bony narrowing of the bilateral C6-C7   
neural foramina and probably mild  
narrowing of the C5-6 foramina.   
Additionally there is narrowing of   
the  
LEFT C3-4 and C4-5 foramina.  
  
Right shoulder: Joint spaces   
maintained. No evidence of   
subacromial spur  
or narrowing of the acromial-humeral   
interval. No fracture is evident.  
  
Right humerus: No fracture or bony   
destruction  
  
Supporting Subjective Information   
Below:  
  
Past Medical History:  
PAST MEDICAL HISTORY  
Diagnosis Date  
Abdominal pain, right lower quadrant  
Abdominal pain, right upper quadrant  
Cerebrovascular disease, unspecified  
Coronary atherosclerosis of   
unspecified type of vessel, native or   
graft  
Diverticulosis of colon (without   
mention of hemorrhage)  
Essential hypertension, benign  
Intrinsic asthma with status   
asthmaticus  
Mixed hyperlipidemia  
Osteoporosis, unspecified  
Thoracic or lumbosacral neuritis or   
radiculitis, unspecified  
  
Past Surgical History:  
PAST SURGICAL HISTORY  
Procedure Laterality Date  
ANGIOPLASTY times 3  
7 stents  
APPENDECTOMY  
COLONOSCOPY FLX DX W/COLLJ SPEC WHEN   
PFRMD 12 years ago  
Colonoscopy  
COLONOSCOPY FLX DX W/COLLJ SPEC WHEN   
PFRMD 2012  
Colonoscopy  
HYSTERECTOMY HX  
  
Family History:  
FAMILY HISTORY  
Problem Relation Age of Onset  
Stroke Father 52  
heart dx,osteoarthritis  
other (rheumatic fever) Sister  
other (rheumatic fever) Sister  
other (heart disease) Sister  
other (heart disease) Brother  
  
Social History:  
Social History  
  
Tobacco Use  
Smoking status: Never  
Smokeless tobacco: Never  
Vaping Use  
Vaping Use: Never used  
Substance Use Topics  
Alcohol use: Never  
Drug use: No  
  
Medications:  
Current Outpatient Medications  
Medication Sig  
rosuvastatin (CRESTOR) 5 mg tablet   
Take 1 tablet by mouth daily at   
bedtime.  
lisinopril (ZESTRIL, PRINIVIL) 10 mg   
tablet Take 1 tablet by mouth once   
daily.  
albuterol (PROVENTIL) 2.5 mg /3 mL   
(0.083 %) nebulizer solution Use 3 mL   
via nebulizer every 6 hours as   
needed.  
meloxicam (MOBIC) 15 mg tablet Take 1   
tablet by mouth once daily. Take with   
food.  
vit B complex no.12/niacin,B3,   
(VITAMIN B COMPLEX NO.12-NIACIN ORAL)   
Take by mouth.  
TUMERIC-GING-OLIVE-OREG-CAPRYL ORAL   
Take by mouth.  
Bisacodyl (DULCOLAX) 5 mg tab Use as   
directed for Miralax / Gatorade Bowel   
Prep Kit  
levalbuterol tartrate HFA (XOPENEX   
HFA) 45 mcg/actuation inhaler Inhale   
2 Puffs as instructed every 6 hours   
as needed.  
Nebulizers (AERONEB GO NEBULIZER) 1   
Each as directed. Portable Nebulizer   
with tubing, Dx: mild persistent   
asthma  
Lactobac no.41/Bifidobact no.7   
(PROBIOTIC-10 ORAL) Take by mouth.  
nitroglycerin sublingual (NITROSTAT)   
0.4 mg SL tablet Dissolve 1 tablet   
under the tongue every 5 minutes as   
needed for Chest Pain. Max of 3   
doses. If no relief, call 911.  
MULTIVITAMIN (VITAMIN A DAY ORAL)   
Take by mouth.  
Cholecalciferol, Vitamin D3, 25 mcg   
(1,000 unit) cap Take 1,000 Units by   
mouth once daily.  
Ascorbic Acid 1,000 mg tablet Take   
1,000 mg by mouth once daily.  
Aspirin 81 mg Tab Take 81 mg by mouth   
once daily.  
  
GLUC COLBERT/CHONDRO COLBERT A/VIT C/MN   
(GLUCOSAMINE 1500 COMPLEX ORAL) Take   
by mouth.  
  
CALCIUM CARBONATE/VITAMIN D3 (CALCIUM   
+ D ORAL) Take by mouth.  
  
No current facility-administered   
medications for this visit.  
  
Allergies: Adhesive Tape-Silicones,   
Demerol [Meperidine (Pf)], Influenza   
Vaccine Tri-Sp 09-10, Iodine,   
Penicillins, and Shellfish  
  
ROS:  
General (negative for fatigue,   
malaise, weight loss/gain)  
HEENT (negative for headache,   
earache, recent vision changes, sinus   
pain, sore throat)  
Respiratory (no recent shortness of   
breath, hemoptysis)  
CV (negative for chest tightness,   
palpitations)  
Musculoskeletal (see HPI)  
Psych (no depression, anxiety)  
  
REFERRING PHYSICIAN:  
Consultation requested by Maria E Guzmán for an opinion regarding   
shoulder pain. My final   
recommendations will be communicated   
back to the requesting physician by   
way of shared Medical record or   
letter to requesting physician via US   
mail.  
  
Maria E Guzmán  
[address] [facility] [address]  
  
Bebeto Rodriguez DO  
[address] [facility] [address]  
  
Herrera Mendoza MD  
  
  
Electronically signed by Hererra Mendoza MD at 2022 11:57 PM EST  
documented in this encounter            [facility]  
   
                                                    2022 History of   
Present illness Narrative               Formatting of this note might be   
different from the original.  
Episode Visit Count: 5  
Therapist That Will Accept/Oversee   
The Plan Of Care: Joon Mata PT  
Start of Care Date: 22  
Onset Date: 22  
Plan of Care Certification Date:   
22  
Next Certification Due Date: 23  
Patient Identified by Name and Date   
of Birth: Yes  
  
REHABILITATION AND SPORTS THERAPY  
PHYSICAL THERAPY TREATMENT NOTE  
  
ASSESSMENT: Elmira Warner   
tolerated the session with  
Pt stated she felt SOB part way   
through using the stepper, Sp02 93%,   
pt then recovered to 96% with 1.5   
minutes of deep breathing, pt wanted   
to finish remainder of 30 seconds on   
the stepper. Pt then walked to mat   
table, eager to start exercises and   
stated she felt flushed, BP taken   
160/100, exercise was ceased and pt   
was given water to drink. Advised pt   
that it was in her best interest and   
not safe to resume any exercises at   
this time for her visit. Pt's PCP   
contacted about BP issue, as pt had   
stated taking her 10 mg of Lisionpril   
this morning. Pt then escorted to   
main [facility] where she stated she felt   
ok and wanted to continue to her next   
appointment. The patient will   
continue to benefit from ongoing   
skilled physical therapy to progress   
toward set goals.  
  
PLAN FOR NEXT VISIT:  
Review, correct and progress HEP to   
tolerance. Continue with AAROM while   
trying to avoid increased pain.   
Hopefully it will be possible in the   
future to progress therex to include   
AROM and strengthening to maximize   
the function of R UE. Use pain as a   
guide at all times. Check for results   
of orthopedic consultation that was   
requested.  
  
  
SUBJECTIVE: Patient Reason for Visit:   
Pt reports that her shoulder is   
feeling wonderful today. Pt was able   
to roll over on her side without it   
hurting, was able to put her hands on   
her head, and was able to use her R   
arm to fill her coffee pot with   
water.  
  
  
  
  
  
  
  
  
  
  
  
  
  
  
  
  
  
  
  
Pain:  
  
  
Post Treatment Pain  
Post Treatment Pain Location:   
Shoulder - Right  
  
OBJECTIVE MEASURES WITH LEVEL OF   
FUNCTION:  
  
  
  
  
  
  
  
  
  
  
  
  
  
  
  
  
  
TREATMENT:  
  
Therapeutic Exercise:  
1: SciFit StepOne seat #10 x3 minutes   
(Pt provided an update on her   
condition and subjective collected.   
Pt stated she felt SOB part way   
through using the stepper, [id number]%,   
pt then recovered to 96% with 1.5   
minutes of deep breathing, pt wanted   
to finish remainder of 30 seconds on   
the stepper.)  
2: * Educated pt on importance of   
stopping exercise due to blood   
pressure to help decrease risk of it   
incresaing.  
Skilled Intervention: Additional time   
necessary for resting due to SOB and   
high BP.  
Educated patient on rationale for   
performing exercises in regards to   
decreasing fatigue and help blood   
pressure to hopefully decrease.  
Patient education as noted.  
  
Billing  
Therapeutic Exercise Treatment   
Minutes: 15  
Total Treatment Time Minutes   
(timed/untimed): 33  
  
Georgina [place], [place] Mata PT  
  
Electronically signed by Joon Mata PT at 2022 12:58 PM EST  
documented in this encounter            [facility]  
   
                                                    2022 History of   
Present illness Narrative               Formatting of this note might be   
different from the original.  
Episode Visit Count: 4  
Therapist That Will Accept/Oversee   
The Plan Of Care: Joon Mata PT  
Start of Care Date: 22  
Onset Date: 22  
Plan of Care Certification Date:   
22  
Next Certification Due Date: 23  
Patient Identified by Name and Date   
of Birth: Yes  
  
REHABILITATION AND SPORTS THERAPY  
PHYSICAL THERAPY TREATMENT NOTE  
  
ASSESSMENT: Elmira Warner   
tolerated the session with decreased   
endurance. She demonstrated   
difficulty with abduction and   
scaption movements due to pinching in   
anterior shoulder. The patient will   
continue to benefit from ongoing   
skilled physical therapy to progress   
toward set goals.  
  
  
  
  
PLAN FOR NEXT VISIT:  
Review, correct and progress HEP to   
tolerance. Continue with AAROM while   
trying to avoid increased pain.   
Hopefully it will be possible in the   
future to progress therex to include   
AROM and strengthening to maximize   
the function of R UE. Use pain as a   
guide at all times. Check for results   
of orthopedic consultation that was   
requested.  
  
  
SUBJECTIVE: Patient Reason for Visit:   
Pt reports that her R shoulder was   
really sore and tired after last   
session. Pt states that her shoulder   
doesn't feel too bad today. Pt later   
reported that some of her soreness   
today may be due to carrying   
groceries in yesterday.  
  
  
  
  
  
  
  
  
  
  
  
  
  
  
  
  
  
  
  
Pain:  
Pain  
Pain Level: (not rated)  
Pain Location: Shoulder - Right;Neck   
- Right  
Description: Aching  
Frequency: Continuous (constant   
aching pain that is sharp with   
certain movements.)  
  
  
  
OBJECTIVE MEASURES WITH LEVEL OF   
FUNCTION:  
  
  
  
  
  
  
Limited ROM noted with abduction   
AAROM with wand due to pinching in   
anterior shoulder.  
  
  
  
  
  
  
  
  
  
  
TREATMENT:  
  
Therapeutic Exercise:  
1: WhimseyboxFit StepOne seat #10 x5 minutes   
(Pt provided an update on her   
condition and subjective collected.)  
2: R shoulder pendulums side to side   
2x10  
3: R shoulder pendulums front to back   
2x10 (pinching sensation in anterior   
shoulder)  
4: R shoulder pendulums CW and CCW   
2x10 each  
5: seated wand AAROM for R shoulder   
flexion 2x10  
6: seated wand AAROM for R shoulder   
abduction 2x10 (Painful, kept range   
below 90 degrees)  
7: seated rope and pulley AAROM for R   
shoulder flexion 2x10  
8: seated rope and pulley AAROM for R   
shoulder scaption 2x10  
9: seated rollouts for R shoulder   
flexion with UE on large physioball   
2x10  
10: seated rollouts for R shoulder   
wcaption with UE on large physioball   
3x10  
Skilled Intervention: Patient was   
educated in proper exercise technique   
and purpose for exercises.  
Skilled judgment was provided in   
selection of appropriate   
interventions.  
Correct performance of therapeutic   
exercises was facilitated with verbal   
and visual cuing.  
  
Billing  
Therapeutic Exercise Treatment   
Minutes: 39  
Total Treatment Time Minutes   
(timed/untimed): 39  
  
Georgina JavierNOLA salinas PT  
  
Electronically signed by Joon Mata PT at 2022 5:18 PM EST  
documented in this encounter            [facility]  
   
                                                    2022 History of   
Present illness Narrative               Formatting of this note is different   
from the original.  
 [address]  
[facility]   
[facility]  
[address]  
MEDICAL OFFICE BUILDING  
[facility] [address]  
Dept: [phone]  
Loc: [phone]  
MD Elmira Plascencia 77 y.o. female  
Patient presents with a chief   
complaint of Follow-up (Pressure L   
side and ear)  
  
BP (!) 181/85   Pulse 77   Ht 5' 4      
Wt 77.1 kg (170 lb)   SpO2 100%   BMI   
29.18 kg/m  
  
History of Presenting Illness:  
  
The patient/caregiver reports a   
history of complaint with the   
following features:  
  
Onset: stared years ago  
Timing: comes and goes  
Duration: brief  
Quality: pressure and radiating pains   
to temples  
Location: both ears  
Severity: pain severe at times  
Risk factors: cervical spine disease   
requiring fusion in the past  
Alleviating factors: nothing gives   
relief  
Aggravating factors: nothing makes it   
worse  
Associated factors: mild swallowing   
trouble since cervical fusion  
  
She admits to some mild hoarseness   
since her cervical fusion. She also   
has a persistent globus sensation   
attributed to reflux, and was just   
prescribed a reflux medication.  
  
Review of systems covering 10 systems   
is reviewed and pertinent positives   
and negatives are noted as above.  
  
Past Medical History:  
Diagnosis Date  
Dislocated intraocular lens  
Hypertension  
Myocardial infarction (HCC)  
Stroke (HCC)  
  
Current Outpatient Medications:  
albuterol (PROVENTIL) 2.5 mg /3 mL   
(0.083 %) nebulizer solution, Inhale   
3 mL (2.5 mg total) ., Disp: , Rfl:  
ascorbic acid, vitamin C, (VITAMIN C)   
1000 MG tablet, Take 1 (one) tablet   
(1,000 mg total) by mouth ., Disp: ,   
Rfl:  
aspirin 81 MG EC tablet, Take 1 (one)   
tablet (81 mg total) by mouth daily   
., Disp: , Rfl:  
bisoprolol (ZEBETA) 5 MG tablet, ,   
Disp: , Rfl:  
brimonidine (ALPHAGAN) 0.2 %   
ophthalmic solution, , Disp: , Rfl:  
calcium carbonate-vitamin D3 600   
mg(1,500mg) -200 unit per tablet,   
Take by mouth., Disp: , Rfl:  
cholecalciferol, vitamin D3, 1,000   
unit capsule, Take 1 (one) capsule   
(1,000 Units total) by mouth ., Disp:   
, Rfl:  
clopidogrel (PLAVIX) 75 mg tablet, ,   
Disp: , Rfl:  
coenzyme Q10 10 mg capsule, Take 10   
mg by mouth., Disp: , Rfl:  
levalbuterol (XOPENEX HFA) 45   
mcg/actuation inhaler, Inhale., Disp:   
, Rfl:  
lisinopril (PRINIVIL,ZESTRIL) 5 MG   
tablet, , Disp: , Rfl:  
magnesium oxide (MAG-OX) 400 mg   
tablet, Take by mouth., Disp: , Rfl:  
ofloxacin (OCUFLOX) 0.3 % ophthalmic   
solution, , Disp: , Rfl:  
potassium chloride, bulk, Powd, Take   
10 mEq by mouth., Disp: , Rfl:  
prednisoLONE acetate (PRED FORTE) 1 %   
ophthalmic suspension, , Disp: , Rfl:  
pyridoxine, vitamin B6, (B-6) 50 MG   
tablet, Take 2 (two) tablets (100 mg   
total) by mouth ., Disp: , Rfl:  
famotidine (PEPCID) 40 MG tablet,   
Take 1 (one) tablet (40 mg total) by   
mouth daily ., Disp: 90 tablet, Rfl:   
3  
[START ON 2022] fluocinolone   
acetonide oiL 0.01 % Drop, Administer   
4 drops into ears Monday, Wednesday,   
Friday Start: 22., Disp: 20 mL,   
Rfl: 3  
Allergies  
Allergen Reactions  
Adhesive Tape-Silicones Rash  
Blisters and pulls skin off  
Iodine GI Intolerance  
Other reaction(s): Other: See   
Comments  
sick /headaches  
Meperidine GI Intolerance  
Other reaction(s): Other - comment   
required  
Terrible headache after demerol after   
myelogram  
Penicillins Hives, Rash and Swelling  
Swelling in throat, lips, mouth,   
hands  
throat and mouth swelling  
Adhesive Hives  
Atorvastatin  
Other reaction(s): Myalgia  
Ct: Iodinated Contrast- Oral And Iv   
Dye  
Other reaction(s): Other - comment   
required  
Doesn't know what happened, but has   
done okay with premed with heart   
caths  
Influenza Vaccine Tri-Sp -10   
Diarrhea  
vomiting/fever  
Meperidine (Pf)  
Other reaction(s): Vomiting  
migraine  
Shellfish Containing Products Hives  
Latex Itching and Rash  
  
Past Surgical History:  
Procedure Laterality Date  
ANGIOPLASTY  
APPENDECTOMY  
CORONARY ANGIOPLASTY WITH STENT   
PLACEMENT  
HYSTERECTOMY  
TUBAL LIGATION  
  
Social History  
  
Socioeconomic History  
Marital status: Unknown  
Tobacco Use  
Smoking status: Never  
Smokeless tobacco: Never  
Substance and Sexual Activity  
Alcohol use: No  
  
History reviewed. No pertinent family   
history.  
  
PHYSICAL EXAM:  
  
The patient was examined today   
2022 with findings as follows:  
  
CONSTITUTIONAL:  
General Appearance: well-appearing,   
nontoxic, alert, no acute distress  
Communication: understanding at   
normal conversational tones, mild   
hoarse voicing, speech intelligible  
  
HEAD/FACE:  
Head: atraumatic, normocephalic, no   
lesions  
Facial Inspection: no lesions,   
healthy skin  
Facial Strength: motor strength   
normal, symmetric strength, symmetric   
movement  
Sinuses: no sinus tenderness  
Salivary Glands: no enlargements of   
parotid glands, no tenderness of   
parotid glands, no masses of parotid   
glands, clear salivary flow on   
palpation from Stensen's ducts, no   
duct stones of Stensen's duct, no   
enlargement of submandibular glands,   
no tenderness of submandibular   
glands, no masses of submandibular   
glands, clear salivary flow from   
[place]'s ducts, no stones of   
[place]'s ducts  
Temporomandibular Joint: right   
crepitus with motion with reduced   
anterior dislocation of left, no   
tenderness on palpation, no trismus,   
motion symmetric  
  
EYES:  
Pupils: PERRLA, extra-ocular   
movements intact, no nystagmus,   
sclera white, no redness of eyes, no   
watering of eyes  
  
EARS:  
Bilateral External Ears: no pits, no   
tags  
Right External Ear: normally formed,   
no lesions, no mastoid tenderness  
Left External Ear: normally formed,   
no lesions, no mastoid tenderness  
Right External Auditory Canal: dry   
flaky skin, no obstructing cerumen,   
no discharge  
Left External Auditory Canal: dry   
flaky skin, no obstructing cerumen,   
no discharge  
Right Tympanic Membrane: normal   
landmarks, translucent, mobile to   
pneumatic otoscopy, no perforation  
Left Tympanic Membrane: normal   
landmarks, translucent, mobile to   
pneumatic otoscopy, no perforation  
Hearing: intact to spoken voice,   
intact to finger rub,  midline,   
Right Ear: Rinne AC>BC, Left Left   
Ear: Rinne AC>BC  
  
NOSE:  
Nasal Skin: no lesions, no   
lacerations, no scars  
Nasal Dorsum: symmetric with no   
visible or palpable deformities  
Nasal Tip: normal symmetric nasal   
tip, normal nasal valves  
Nasal Mucosa: normal, pink and moist  
Septum: not markedly deformed,   
midline, no exposed vessels, no   
bleeding, no septal granuloma  
Turbinates: normal size and   
conformation  
Nasopharynx: normal  
  
ORAL CAVITY/MOUTH:  
Lips, teeth, gums: normal lips,   
normal gums, dentition intact, no   
dental pain on palpation  
Oral Mucosa: normal, moist, no   
lesions  
Palate: normal hard palate, normal   
soft palate, symmetric palatal   
elevation  
Floor of Mouth: normal floor of mouth  
Tongue: normal tongue, no lesions, no   
edema, no masses, normal mucosa,   
mobile  
Tonsils: normal tonsils, symmetric,   
no lesions  
Posterior pharynx: normal  
  
HYPOPHARYNX/LARYNX:  
Hypopharynx: normal hypopharynx,   
normal tongue base, normal pyriform   
sinus, normal vallecula  
Larynx: normal epiglottis, normal   
false vocal cords, normal true vocal   
cords, sluggish left glottic   
mobility, arytenoid edema,   
post-cricoid edema, no subglottic   
stenosis  
  
NECK:  
Neck: no masses, trachea midline,   
normal range of motion, no cysts or   
pits, no tenderness to palpation  
Thyroid: normal thyroid, no   
enlargement, no tenderness, no   
nodules  
  
LYMPH NODES:  
Cervical: no palpable lymph node   
enlargement  
  
SKIN:  
General Appearance: no lesions, warm   
and dry, normal turgor, no bruising  
  
NEUROLOGICAL SYSTEM:  
Orientation: oriented to time,   
oriented to place, oriented to person  
Cranial Nerves: Cranial Nerves II-XII   
intact, normal facial movement  
  
PSYCHIATRIC:  
Mood and affect: normal mood, normal   
affect  
  
Assessment and Plan:  
  
I see no ear or sinus disease and   
suspect that her sporadic lancinating   
facial pain is due to a neuralgia or   
her TMJ disease. If this is frequent,   
a trial of physical therapy or   
alternative pain management   
strategies given her reflux disease   
and Plavix which will limit NSAID.   
The patient and/or caregiver is   
advised on symptom management with   
the use of over the counter   
medication of ibuprofen 600 mg every   
8 hours for three days if there is no   
medical contraindication, the   
avoidance of gum chewing, a soft   
diet, warm compresses to the area,   
gentle stretching of the jaw, and   
stress management. I have discussed   
that if this should fail to give   
adequate relief, consideration for   
dental evaluation and fitting of a   
bite block may be considered. The   
patient and/or caregiver is to notify   
me for any acute worsening or failure   
to relieve symptoms prior to   
scheduled follow-up. The patient   
and/or caregiver is able to state an   
understanding of these   
recommendations and is agreeable to   
the treatment plan.  
  
She has some mild hoarseness and the   
risk of laryngeal nerve injury with   
cervical fusions is noted. She does   
have some mild paresis, but in the   
absence of paralysis, the potential   
for ongoing recovery is good.  
  
Finally, she has signs of reflux   
disease. As PPI therapy is   
contraindicated with Plavix,   
Famotidine is suggested as an   
alternative. The patient and/or   
caregiver is advised on symptom   
management with the use of prescribed   
medication, weight loss, dietary   
changes with the avoidance of   
caffeine and carbonated beverages,   
the avoidance of heavy, spicy, or   
fatty meals and breaking meals up   
into several small meals throughout   
the day, the avoidance of eating less   
than two hours prior to bedtime, the   
elevation of the head of bed, and the   
practice of laryngeal hygiene with   
frequent sips of water to clear the   
throat and to maintain adequate   
hydration. The patient and/or   
caregiver is to notify the office if   
no improvement or worsening of   
symptoms is noted prior to the   
scheduled follow-up for sooner   
evaluation. We have discussed that   
the safety of long term use of   
certain agents is uncertain and that   
should long term therapy be needed   
that loss of bone density, decreased   
absorption of some vitamins and   
minerals, and injury to internal   
organs has been reported. Weaning off   
of these medications when able may   
have long term benefits in reducing   
these risks. The patient and/or   
caregiver is able to state an   
understanding of these   
recommendations and is agreeable to   
the treatment plan.  
  
1. Bilateral temporomandibular joint   
pain  
  
2. Chronic eczematous otitis externa   
of both ears fluocinolone acetonide   
oiL 0.01 % Drop  
  
3. Hoarse  
  
4. Gastroesophageal reflux disease,   
unspecified whether esophagitis   
present famotidine (PEPCID) 40 MG   
tablet  
  
  
Return in about 3 months (around   
3/1/2023).  
  
The patient and/or caregiver is to   
notify the office if no improvement   
or worsening of symptoms is noted   
prior to the scheduled follow-up for   
sooner evaluation. The patient and/or   
caregiver is able to state an   
understanding of these   
recommendations and is agreeable to   
the treatment plan.  
  
--John Kumari MD on   
2022 at 11:54 AM  
An electronic signature was used to   
authenticate this note.  
Electronically signed by John Kumari MD at 2022 11:55 AM EST  
Formatting of this note might be   
different from the original.  
Review of Systems  
Constitutional: Negative.  
HENT: Positive for ear pain, hearing   
loss, sinus pressure, sinus pain,   
sneezing, sore throat, tinnitus and   
trouble swallowing.  
Eyes: Negative.  
Respiratory: Positive for choking,   
shortness of breath and wheezing.  
Cardiovascular: Negative.  
Gastrointestinal: Negative.  
Endocrine: Negative.  
Genitourinary: Negative.  
Musculoskeletal: Negative.  
Skin: Negative.  
Allergic/Immunologic: Negative.  
Neurological: Positive for dizziness.  
Hematological: Negative.  
Psychiatric/Behavioral: Negative.  
  
Electronically signed by Naida Null MA at 2022 11:55 AM EST  
documented in this encounter            [facility]  
   
                                                    2022 Miscellaneous   
Notes                                   Formatting of this note might be   
different from the original.  
Pt. notified. Voices understanding.   
Sintia Pastor RN  
  
Electronically signed by Sintia Pastor RN at 2022 1:04 PM   
EST  
Formatting of this note might be   
different from the original.  
----- Message from Jolene Liang APRN.CNP sent at 2022 12:25 PM   
EST -----  
Please call patient and notify her of   
results. BMP reveals stable kidney   
function and electrolytes. CBC is   
within normal limits. Thank you!  
Electronically signed by Sintia Pastor RN at 2022 1:03 PM   
EST  
documented in this encounter            [facility]  
   
                                        2022 Instructions   
  
  
Jolene Liang APRN.CNP -   
2022 3:53 PM ESTFormatting of   
this note is different from the   
original.  
Images from the original note were   
not included.  
CORONARY ARTERY DISEASE  
  
View image  
  
View image  
WHAT IS CORONARY ARTERY DISEASE?  
Coronary artery disease (CAD) is a   
type of heart disease caused by a   
problem with the blood vessels that   
bring blood and oxygen to the heart   
muscle. These arteries are called the   
coronary arteries. This disease   
increases your risk for heart attack   
and sudden death.  
WHAT IS THE CAUSE?  
Fatty deposits called plaque may   
build up in blood vessels and make   
them narrower. The narrowing   
decreases the amount of blood flow to   
the heart. Plaque also increases the   
chance that blood clots may form and   
block a blood vessel, which can cause   
a heart attack or stroke.  
Your risk for CAD may be higher if   
you:  
Have a family history of coronary   
artery disease at an early age  
Smoke  
Have high blood pressure  
Have diabetes  
Are very overweight  
Don t get enough exercise  
Have high levels of blood fat--for   
example, high cholesterol  
WHAT ARE THE SYMPTOMS?  
Coronary artery disease may not cause   
any symptoms. When there are   
symptoms, the most common one is   
chest pain, called angina. You may   
feel:  
A feeling of tightness or heaviness   
in the chest  
Squeezing, pressure, or burning in   
the chest  
Angina symptoms usually:  
Last for 5 minutes or less and go   
away with rest or medicine such as   
nitroglycerin.  
Happen when the heart has to work   
harder, such as after a heavy meal or   
during physical activity or emotional   
stress.  
Angina may also happen when you are   
resting.  
Call 911 for emergency help right   
away if you have symptoms of a heart   
attack. The most common symptoms   
include:  
Chest pain or pressure, squeezing, or   
fullness in the center of your chest   
that lasts more than a few minutes,   
or goes away and comes back (may feel   
like indigestion or heartburn)  
Pain or discomfort in one or both   
arms or shoulders, or in your back,   
neck, jaw, or stomach  
Trouble breathing  
Breaking out in a cold sweat for no   
known reason  
If your provider has prescribed   
nitroglycerin for angina, pain that   
does not go away after taking your   
nitroglycerin as directed  
Along with these symptoms, you may   
also feel very tired, faint, or be   
sick to your stomach.  
HOW IS IT DIAGNOSED?  
Your healthcare provider will ask   
about your symptoms and medical   
history and examine you. Tests may   
include:  
Blood tests  
An ECG (also called an EKG or   
electrocardiogram), which measures   
and records your heartbeat.  
An exercise treadmill test to see how   
your heart works when you exercise  
An echocardiogram, which uses sound   
waves (ultrasound) to see how well   
your heart is pumping  
Angiogram, which is a series of   
X-rays taken after your healthcare   
provider injects a special dye into   
your blood vessels to show the walls   
of the arteries and any blockage  
CT scan, which uses X-rays and a   
computer to show detailed pictures of   
the arteries  
HOW IS IT TREATED?  
Your treatment depends on many   
factors, such as your age, heart   
muscle function, and other health   
problems. At first, treatment may   
include diet changes and an exercise   
program. Your healthcare provider may   
prescribe medicine.  
Many people need to take 2 or more   
medicines to help prevent a heart   
attack or stroke. It may take several   
weeks or months to find the best   
treatment for you.  
Your provider may also prescribe   
other types of medicine to lower   
blood pressure, help stop chest pain,   
control an irregular heartbeat, help   
prevent blood clots, or lower blood   
fat (cholesterol).  
Your provider may recommend a daily   
low dose of aspirin. Taking an   
aspirin every day may lower your risk   
for a heart attack or stroke. Not   
everyone should take aspirin. Daily   
use of aspirin can cause problems,   
such as stomach irritation, bleeding,   
and hearing loss. Ask your healthcare   
provider if you should take aspirin   
and if so, how much to take.  
If your coronary arteries are badly   
blocked, you may need balloon   
angioplasty or bypass surgery.  
A balloon angioplasty opens blocked   
blood vessels and improves blood   
flow. A metal mesh device called a   
stent is usually left in the blood   
vessels to help keep them open.  
Bypass surgery uses blood vessels   
from other parts of the body, or   
manmade material, to make a new path   
around a blocked area.  
HOW CAN I TAKE CARE OF MYSELF? CC  
If you have coronary artery disease,   
there are things you can do to take   
care of yourself now and prevent   
problems in the future.  
Follow your provider's advice about   
activity, exercise, medicine, and   
follow-up visits.  
Lower the amount of salt, saturated   
and trans fats, and cholesterol in   
your diet.  
Work with your healthcare provider to   
control diabetes, blood pressure, or   
other health problems you may have.  
Try to keep a healthy weight. If you   
are overweight, talk to your provider   
about ways to lose weight.  
If you smoke, try to quit. Talk to   
your healthcare provider about ways   
to quit smoking.  
Ask your healthcare provider:  
How and when you will hear your test   
results  
How long it will take to recover  
What activities you should avoid and   
when you can return to your normal   
activities  
How to take care of yourself at home  
What symptoms or problems you should   
watch for and what to do if you have   
them  
Make sure you know when you should   
come back for a checkup.  
HOW CAN I HELP PREVENT CORONARY   
ARTERY DISEASE?  
You can prevent this disease with a   
heart-healthy lifestyle:  
Eat a healthy diet and keep a healthy   
weight.  
Stay fit with the right kind of   
exercise for you.  
Find ways to manage stress.  
Don t smoke.  
Limit your use of alcohol.  
Talk to your healthcare provider   
about your personal and family   
medical history and your lifestyle   
habits. This will help you know what   
you can do to lower your risk for   
coronary artery disease.  
If you have a strong family history   
of CAD, a healthy lifestyle may slow   
the start of the disease and maybe   
even keep you from getting it.   
However, you must have regular   
checkups to keep a close watch on the   
health of your heart.  
Developed by [employer].  
Published by [employer].  
Copyright 2014 [employer]   
and/or one of its subsidiaries. All   
rights reserved.  
Electronically signed by BRANDY RoCNP at 2022 3:53 PM   
EST  
  
documented in this encounter            [facility]  
   
                                                    2022 History of   
Present illness Narrative               Formatting of this note is different   
from the original.  
Chief Complaint  
Patient presents with:  
Established Patient Follow-Up  
  
History of Present Illness:  
Elmira Warner is a 77 year old   
female who presents for routine   
follow up. She has a PMhx of CAD (s/p   
MI in , s/p THEA to the LAD, LCX   
and RCA and more recently THEA to RCA   
in ), CVA, HTN, HLD. She was last   
seen in office by Dr. Chan on   
2022. She explains she was doing   
fantastic post back surgery and was   
just released by her back surgeon to   
resume full activity. She   
unfortunately then had a mechanical   
fall on her dog's leash and injured   
her right shoulder. She is completing   
physical therapy and seen orthopedics   
for an evaluation. She has been   
without significant cardiac symptoms.   
She does endorse shortness of breath   
if she does heavy exertion such as   
carrying laundry up the stairs. She   
otherwise denies chest pain,   
dizziness, palpitations, orthopnea,   
LE swelling. We reviewed cardiac risk   
factors and modifications. Her blood   
pressure is suboptimal in office   
today. She states her lisinopril was   
recently increased to 10 mg by her   
PCP. I have ordered her a follow-up   
BMP and she will continue home   
monitoring. She reports taking   
medications as prescribed.  
  
PAST MEDICAL HISTORY  
Diagnosis Date  
Abdominal pain, right lower quadrant  
Abdominal pain, right upper quadrant  
Cerebrovascular disease, unspecified  
Coronary atherosclerosis of   
unspecified type of vessel, native or   
graft  
Diverticulosis of colon (without   
mention of hemorrhage)  
Essential hypertension, benign  
Intrinsic asthma with status   
asthmaticus  
Mixed hyperlipidemia  
Osteoporosis, unspecified  
Thoracic or lumbosacral neuritis or   
radiculitis, unspecified  
  
PAST SURGICAL HISTORY  
Procedure Laterality Date  
ANGIOPLASTY times 3  
7 stents  
APPENDECTOMY  
COLONOSCOPY FLX DX W/COLLJ SPEC WHEN   
PFRMD 12 years ago  
Colonoscopy  
COLONOSCOPY FLX DX W/COLLJ SPEC WHEN   
[facility] 2012  
Colonoscopy  
HYSTERECTOMY HX  
  
FAMILY HISTORY  
Problem Relation Age of Onset  
Stroke Father 52  
heart dx,osteoarthritis  
other (rheumatic fever) Sister  
other (rheumatic fever) Sister  
other (heart disease) Sister  
other (heart disease) Brother  
  
Social History  
  
Tobacco Use  
Smoking status: Never  
Smokeless tobacco: Never  
Vaping Use  
Vaping Use: Never used  
Substance Use Topics  
Alcohol use: Never  
Drug use: No  
  
  
ALLERGIES  
Allergen Reactions  
Adhesive Tape-Silic* Rash  
Demerol [Meperidine* Vomiting  
migraine  
Influenza Vaccine T* Diarrhea  
vomiting/fever  
Iodine Other: See Comments  
sick /headaches  
Penicillins Hives  
throat and mouth swelling  
Shellfish Hives  
  
Medications:  
Current Outpatient Medications  
Medication Sig Dispense Refill  
naproxen (NAPROSYN) 500 mg tablet   
Take 1 tablet by mouth twice daily as   
needed (for pain/inflammation) for up   
to 14 days. Take with food. 28 tablet   
0  
rosuvastatin (CRESTOR) 5 mg tablet   
Take 1 tablet by mouth daily at   
bedtime. 90 tablet 3  
albuterol (PROVENTIL) 2.5 mg /3 mL   
(0.083 %) nebulizer solution Use 3 mL   
via nebulizer every 6 hours as   
needed. 100 Vial 3  
meloxicam (MOBIC) 15 mg tablet Take 1   
tablet by mouth once daily. Take with   
food. 30 tablet 2  
vit B complex no.12/niacin,B3,   
(VITAMIN B COMPLEX NO.12-NIACIN ORAL)   
Take by mouth.  
TUMERIC-GING-OLIVE-OREG-CAPRYL ORAL   
Take by mouth.  
zinc sulfate (ZINC-15 ORAL) Take by   
mouth.  
levalbuterol tartrate HFA (XOPENEX   
HFA) 45 mcg/actuation inhaler Inhale   
2 Puffs as instructed every 6 hours   
as needed. 1 Inhaler 5  
Nebulizers (AERONEB GO NEBULIZER) 1   
Each as directed. Portable Nebulizer   
with tubing, Dx: mild persistent   
asthma 1 Each 0  
magnesium oxide (MAG-OX) 400 mg   
(241.3 mg magnesium) tablet Take 400   
mg by mouth once daily.  
Lactobac no.41/Bifidobact no.7   
(PROBIOTIC-10 ORAL) Take by mouth.  
nitroglycerin sublingual (NITROSTAT)   
0.4 mg SL tablet Dissolve 1 tablet   
under the tongue every 5 minutes as   
needed for Chest Pain. Max of 3   
doses. If no relief, call 911. 1   
Bottle of 25 1  
potassium chloride (K-TAB) 10 mEq   
tablet Take 1 tablet by mouth daily   
with breakfast.  
MULTIVITAMIN (VITAMIN A DAY ORAL)   
Take by mouth.  
Cholecalciferol, Vitamin D3, 25 mcg   
(1,000 unit) cap Take 1,000 Units by   
mouth once daily.  
Ascorbic Acid 1,000 mg tablet Take   
1,000 mg by mouth once daily.  
Aspirin 81 mg Tab Take 81 mg by mouth   
once daily.  
  
GLUC COLBERT/CHONDRO COLBERT A/VIT C/MN   
(GLUCOSAMINE 1500 COMPLEX ORAL) Take   
by mouth.  
  
CALCIUM CARBONATE/VITAMIN D3 (CALCIUM   
+ D ORAL) Take by mouth.  
lisinopril (ZESTRIL, PRINIVIL) 10 mg   
tablet Take 1 tablet by mouth once   
daily. 90 tablet 0  
oxyCODONE-acetaminophen (PERCOCET)   
5-325 mg tablet as needed for pain.   
(Patient not taking: Reported on   
2022)  
Bisacodyl (DULCOLAX) 5 mg tab Use as   
directed for Miralax / Gatorade Bowel   
Prep Kit (Patient not taking:   
Reported on 2022) 4 tablet 0  
ubidecarenone Q-10 (COENZYME Q-10) 10   
mg cap Take 10 mg by mouth once   
daily. (Patient not taking: Reported   
on 2022)  
Potassium 99 mg tab Take 1 tablet by   
mouth once daily. (Patient not   
taking: No sig reported)  
  
No current facility-administered   
medications for this visit.  
  
  
Review of Systems  
Constitutional: Negative for chills,   
diaphoresis, fever, malaise/fatigue   
and weight loss.  
HENT: Negative for congestion, ear   
pain, nosebleeds, sinus pain and sore   
throat.  
Eyes: Negative for pain.  
Respiratory: Positive for shortness   
of breath. Negative for cough and   
wheezing.  
Cardiovascular: Negative for chest   
pain, palpitations and leg swelling.  
Gastrointestinal: Positive for   
heartburn. Negative for abdominal   
pain, blood in stool and melena.  
Genitourinary: Negative for   
hematuria.  
Musculoskeletal: Positive for back   
pain and joint pain. Negative for   
falls.  
Neurological: Negative for dizziness,   
tingling, sensory change, speech   
change, focal weakness, loss of   
consciousness, weakness and   
headaches.  
Endo/Heme/Allergies: Does not   
bruise/bleed easily.  
Psychiatric/Behavioral: Negative for   
depression, memory loss and suicidal   
ideas. The patient is not   
nervous/anxious and does not have   
insomnia.  
  
Physical Examination:  
Vitals:/80   Pulse 84   Wt 170   
lb (77.1kg)  
  
  
BP w/Orthostatic Vitals  
  
Date and Time Orthostatic BP   
Orthostatic Pulse BP Pulse BP   
Position BP Site BP Cuff Size  
22 1533 -- -- 150/80 84 Sitting   
Left Arm Large Adult  
  
  
  
Last 2 Encounter Wt Readings:  
Date: Wt:  
2022 170 lb (77.1 kg)  
2022 166 lb 6.4 oz (75.5 kg)  
  
Physical Exam  
HENT:  
Head: Normocephalic.  
Eyes:  
Pupils: Pupils are equal, round, and   
reactive to light.  
Cardiovascular:  
Rate and Rhythm: Normal rate and   
regular rhythm.  
Pulses:  
Radial pulses are 2+ on the right   
side and 2+ on the left side.  
Dorsalis pedis pulses are 2+ on the   
right side and 2+ on the left side.  
Heart sounds: Normal heart sounds, S1   
normal and S2 normal.  
Pulmonary:  
Effort: Pulmonary effort is normal.   
No accessory muscle usage or   
respiratory distress.  
Breath sounds: Normal breath sounds.  
Abdominal:  
General: Bowel sounds are normal.  
Palpations: Abdomen is soft.  
Musculoskeletal:  
General: Normal range of motion.  
Cervical back: Normal range of   
motion.  
Right lower leg: No edema.  
Left lower leg: No edema.  
Skin:  
General: Skin is warm and dry.  
Neurological:  
Mental Status: She is alert and   
oriented to person, place, and time.  
Gait: Gait is intact.  
Psychiatric:  
Mood and Affect: Affect normal.  
Cognition and Memory: Memory normal.  
Judgment: Judgment normal.  
  
Most Recent Cardiac Testing  
2019  
Nuclear portion of the exam:  
1. No evidence of ischemia  
2. There is a moderate sized,   
moderate severity perfusion defect of   
the  
base to mid inferior and   
inferolateral walls that improves   
with prone  
imaging that is likely artifact.  
3. Normal left ventricular size and   
function with an ejection fraction of  
65%.  
  
Assessment and Plan:  
CAD  
-Patient reports she has 7 stents in   
total. She is s/p MI in  with THEA   
to the LAD, LCX and RCA and more   
recently THEA to RCA in   
-without symptoms concerning for   
angina  
-EF 65%  
-continue ASA, plavix, statin  
-encouraged routine activity and   
heart healthy diet for risk factor   
modification  
  
HTN  
-150/80. Lisinopril has been   
increased to 10 mg  
-Continue current medication(s)  
-Encouraged dietary sodium   
restriction/DASH diet  
-Recommended regular aerobic   
exercise.  
-Recommend home blood pressure   
monitoring, to bring results in on   
next visit  
-Discussed need and benefit for   
weight loss.  
-Goal of BP <130/80  
  
HLD  
-lipid panel 2021   
-continue Crestor 5 mg  
-repeat CMP and FLP  
  
Palpitations  
-reported at last office visit.   
Reports symptoms have resolved  
-monitor was ordered at last office   
visit, results are not available for   
review today  
-recommended conservative measures:   
stay hydrated, limit   
stimulants/caffeine, limit stress  
  
Follow up in 6 months. Patient to   
call with any issues or concerns   
prior to then.  
  
  
Electronically signed by Jolene Liang APRN.CNP on 2022,   
3:37 PM  
  
  
Electronically signed by Jolene Liang APRN.CNP at 2022 10:23   
AM EST  
documented in this encounter            [facility]  
   
                                                    2022 Miscellaneous   
Notes                                   Summary: Colonoscopy  
  
Formatting of this note might be   
different from the original.  
Called PT to schedule for a   
colonoscopy, PT stated she did want   
to have it done at [facility] and that   
she preferred [facility], she said she will   
schedule there.  
  
Thanks  
Electronically signed by Allison Valdez at 2022 12:56 PM EST  
Formatting of this note might be   
different from the original.  
Please see Nurse Triage note from   
22. Patient has been updated   
with providers message to increase   
Lisinopril to 10mg daily. Pt aware rx   
sent to pharmacy.  
  
Please assist patient in scheduling   
colonoscopy as ordered. Thank you.   
REJI Javed  
Electronically signed by REJI Javed at 2022 12:42 PM EST  
Formatting of this note might be   
different from the original.  
See OTHER TE regarding her BLOOD   
PRESSURE and notify patient as well   
of this medication change to her   
lisinopril.  
  
Also referral for colonoscopy placed,   
please fax and notify patient  
  
Bebeto Rodriguez DO  
  
  
Electronically signed by Bebeto Rodriguez DO at 2022 8:23 AM   
EST  
Formatting of this note might be   
different from the original.  
Spoke with patient and she is not   
having specific issues. Just due for   
colonoscopy.  
  
Please place order.  
  
Also patient mentioned that she her   
BP medication went from Lisinopril 5   
mg 1/2 tablet to 5 mg one tablet   
daily. Patient has been taking in the   
morning  
  
BP's are still ranging in 160-170 to   
high 90's and 100's.  
  
Please advise. I don't see a follow   
up BP appointment scheduled.  
  
Norma Monae MA  
  
Electronically signed by Norma Monae MA at 2022 10:31 AM EST  
Formatting of this note might be   
different from the original.  
TC contact patient and VM full.   
Please try again later.  
  
Latoya Mathew Ma  
Electronically signed by Latoya Mathew Ma at 2022 10:50 AM EST  
Formatting of this note might be   
different from the original.  
It does look like her last   
colonoscopy was 10 years ago which   
would make her due for one. Is she   
having any complaints/concerns?  
UMER King.BLANE  
  
Electronically signed by Maria E Guzmán APRN.CNP at 2022   
10:34 AM EST  
Formatting of this note might be   
different from the original.  
Allison with [facility] calling to state patient   
has contacted them-inquiring about a   
colonoscopy. Allison told pt that PCP   
office would be updated of request.  
  
Asking if PCP feels a colonoscopy   
would be appropriate for pt, and if   
so, to please send order, demographic   
info and OV notes to them at FAX:   
[phone].  
  
Please call pt with update.  
  
Thank you.  
Electronically signed by Janeth Costello RN at 11/15/2022 8:44 AM EST  
documented in this encounter            [facility]  
   
                                                    2022 Miscellaneous   
Notes                                   Formatting of this note might be   
different from the original.  
TC to patient who verbalizes   
understanding of medication change.   
Pt is now scheduled for a follow up   
appointment to check BP and discuss   
any further treatment with RS on Dec   
7th at 12. REJI Javed  
Electronically signed by REJI Javed at 2022 12:26 PM EST  
Formatting of this note might be   
different from the original.  
Call placed to patient with no   
answer. Message left for patient to   
return call and ask for a triage   
nurse to receive provider message.  
  
Radha Richey RN  
  
Electronically signed by Radha Richey RN at 2022 8:26 AM   
EST  
Formatting of this note is different   
from the original.  
Please have her increase dose of   
lisinopril to 10 mg a day for better   
BLOOD PRESSURE control. rx sent as   
below.  
  
Needs to have nurse visit in 1-2   
weeks for BLOOD PRESSURE recheck  
  
Bebeto Rodriguez DO  
  
The following approved medication   
requests have been transmitted   
electronically.  
Requested Prescriptions  
  
Signed Prescriptions Disp Refills  
lisinopril (ZESTRIL, PRINIVIL) 10 mg   
tablet 90 tablet 0  
Sig: Take 1 tablet by mouth once   
daily.  
  
Bebeto Rodriguez DO  
  
Electronically signed by Bebeto Rodriguez DO at 2022 8:12 AM   
EST  
Formatting of this note might be   
different from the original.  
Patient calls to report high blood   
pressure. Nurse triage completed.   
Protocol recommends see provider   
within 3 days. Patient reports   
provider already aware of high blood   
pressure and declines just wants   
updated that medication is not   
working. Patient asking for provider   
to review medication and make   
adjustment.  
Care advice and when to call back   
reviewed with patient. Patient   
verbalizes understanding.  
  
Reason for Disposition  
Systolic BP >= 160 OR Diastolic >=   
100  
  
Answer Assessment - Initial   
Assessment Questions  
1. BLOOD PRESSURE: 161/105 and then   
177/105 with this nurse on the phone  
2. ONSET: Right before calling and   
while on phone with this nurse  
  
3. HOW: Automatic home blood pressure   
monitor.  
  
4. HISTORY: Patient has a history of   
high blood pressure  
  
5. MEDICATIONS: Lisinopril 5 mg   
daily. No missed doses. Patient   
verified with pharmacy that they   
dispensed the correct dose.  
  
6. OTHER SYMPTOMS: No chest pain,   
blurred vision, difficulty breathing,   
headache, or weakness.  
  
Protocols used: Blood Pressure -   
High-ADULT-  
  
Electronically signed by Radha Richey RN at 2022 12:28 PM   
EST  
documented in this encounter            [facility]  
   
                                                    2022 History of   
Present illness Narrative               Formatting of this note might be   
different from the original.  
Episode Visit Count: 2  
Therapist That Will Accept/Oversee   
The Plan Of Care: Joon Mata PT  
Start of Care Date: 22  
Onset Date: 22  
Plan of Care Certification Date:   
22  
Next Certification Due Date: 23  
Patient Identified by Name and Date   
of Birth: Yes  
  
REHABILITATION AND SPORTS THERAPY  
PHYSICAL THERAPY TREATMENT NOTE  
  
ASSESSMENT: Elmira Warner   
tolerated the session with fatigue,   
decreased symptoms, and expected   
muscle soreness. She demonstrated   
improvements in pain and pt   
perception of AROM. The patient will   
continue to benefit from ongoing   
skilled physical therapy to progress   
toward set goals.  
  
  
  
  
PLAN FOR NEXT VISIT:  
Review, correct and progress HEP to   
tolerance. Continue with AAROM while   
trying to avoid increased pain.   
Hopefully it will be possible in the   
future to progress therex to include   
AROM and strengthening to maximize   
the function of R UE. Use pain as a   
guide at all times. Check for results   
of orthopedic consultation that was   
requested.  
  
  
SUBJECTIVE: Patient Reason for Visit:   
Pt reports that overall her R   
shoulder is improved with increased   
AROM, especially abduction. She   
reports compliance with HEP 2x day.   
Pt reports that she has an orthopedic   
consult scheduled for 22.  
  
  
  
  
  
  
  
  
  
  
  
  
  
  
  
  
  
  
  
Pain:  
Pain  
Pain Level: 3  
Pain Location: Shoulder - Right;Neck   
- Right  
Description: Aching;Sharp  
Frequency: Continuous (constant   
aching pain that is sharp with   
certain movements.)  
  
Post Treatment Pain  
Post Treatment Pain Location:   
Shoulder - Right  
  
OBJECTIVE MEASURES WITH LEVEL OF   
FUNCTION:  
  
  
  
  
  
  
  
  
  
  
  
  
  
  
  
  
  
TREATMENT:  
  
Therapeutic Exercise:  
1: Whimsey[employer] StepOne seat #10 x5 minutes   
(Pt provided an update on her   
condition and subjective collected.)  
2: R shoulder pendulums side to side   
2x10  
3: R shoulder pendulums front to back   
2x10  
4: R shoulder pendulums CW and CCW   
2x10 each  
5: *seated wand AAROM for R shoulder   
flexion 2x10  
6: *seated wand AAROM for R shoulder   
abduction 2x10  
7: seated rope and pulley AAROM for R   
shoulder flexion 2x10  
8: seated rope and pulley AAROM for R   
shoulder abduction 2x10  
9: seated rollouts for R shoulder   
flexion with UE on large physioball   
2x10  
10: seated rollouts for R shoulder   
abduction with UE on large physioball   
2x10  
11: seated body blade for R UE, arm   
at side, blade horizontal, up and   
down 3x30 seconds  
Skilled Intervention: Patient was   
educated in proper exercise technique   
and purpose for exercises.  
Reviewed and educated patient on   
additions/changes for home exercise   
program as above (*).  
Skilled judgment was provided in   
selection of appropriate   
interventions.  
Provided written instruction for home   
exercise program to facilitate proper   
performance and compliance.  
Correct performance of therapeutic   
exercises was facilitated with   
verbal, visual, and tactile cuing.  
Patient education as noted.  
  
Billing  
Therapeutic Exercise Treatment   
Minutes: 43  
Total Treatment Time Minutes   
(timed/untimed): 43  
  
Joon Mata PT  
  
  
Electronically signed by Joon Mata PT at 2022 2:55 PM EST  
documented in this encounter            [facility]  
   
                                                    2022 Miscellaneous   
Notes                                   Formatting of this note might be   
different from the original.  
Pt called and is notified of   
providers message and instructions.   
Pt voices understanding. Pt was put   
through to scheduling to see who we   
have openings with for Orthopedics,   
and will check around [place] and may   
call back to have orders sent   
somewhere local.  
  
Shaniqua Michelle RN  
  
Electronically signed by Shaniqua Michelle RN at 2022 10:58 AM   
EST  
Formatting of this note might be   
different from the original.  
Message left for patient to call PCP   
office for message below.  
  
Janeth Costello RN  
Electronically signed by Janeth Costello RN at 2022 2:56 PM EST  
Formatting of this note might be   
different from the original.  
Please contact patient and assist her   
to schedule with orthopedics.  
UMER King.BLANE  
  
Electronically signed by Maria E Guzmán APRN.CNP at 2022 1:58   
PM EST  
Formatting of this note might be   
different from the original.  
----- Message from Joon Mata PT   
sent at 2022 1:30 PM EST -----  
Regarding: Ortho referral?  
Toby Roland and carroll afternoon,  
I completed my initial evaluation for   
this patient this morning. I am   
suspicious that she has a rotator   
cuff tear based on her severe AROM   
restriction with nearly full PROM and   
the empty can test that revealed pain   
and weakness. She was unable to hold   
the test position without assistance.   
I doubt that she would be a good   
candidate for a rotator cuff repair   
surgery but I was wondering if you   
think an ortho consult would be a   
good idea. Since she is still in the   
acute phase she has more options but   
as time goes on, her candidacy for   
surgery becomes progressively less   
due to retraction of the muscle. Like   
I said, I am not sure she is a good   
candidate for surgery regardless of   
the timing but she was open to a   
consult. I told her that if the team   
felt a consult was warranted, your   
office with facilitate scheduling   
this.  
Let me know your thoughts,  
Joon Mata, PT  
[phone]  
  
Electronically signed by BRANDY KingCNP at 2022 1:55   
PM EST  
documented in this encounter            [facility]  
   
                                                    2022 History of   
Present illness Narrative               Formatting of this note is different   
from the original.  
Episode Visit Count: 1  
Therapist That Will Accept/Oversee   
The Plan Of Care: Joon Mata PT  
Start of Care Date: 22  
Onset Date: 22  
Plan of Care Certification Date:   
22  
Next Certification Due Date: 23  
Patient Identified by Name and Date   
of Birth: Yes  
  
REHABILITATION AND SPORTS THERAPY  
PHYSICAL THERAPY EVALUATION  
  
PLAN OF CARE:  
Assessment: Elmira Warner   
presents with chief complaint of R   
shoulder pain and dysfunction that   
interferes with reaching behind   
back;reaching overhead;lifting   
(putting dishes away, retrieving from   
microwave, doing dishes) . She   
presents with impairments in ADL's,   
independence in exercise, overall   
function, range of motion, strength ,   
symptom management, and tissue   
tenderness. Prognosis for therapy is   
Good due to: current objective   
clinical presentation;good overall   
health status;acuteness of condition.   
She will benefit from skilled therapy   
services to meet the goals   
established for this plan of care as   
noted below.  
  
  
  
Goals for Episode of Care: created on   
22 through 23  
[place] in home exercise   
program.  
Patient will decrease pain rating by   
2 points to meet minimal clinical   
important difference for numeric pain   
rating scale.  
Patient will increase active ROM of R   
shoulder to symmetrical to allow pt   
to to improve performance of ADLs.  
Perform reaching, lifting and   
household chores with decreased   
report of symptoms/pain in 8 weeks.  
Increased strength of R shoulder to   
WFL for improved reaching and   
lifting.  
Patient Goals: regain normal use of R   
UE.  
  
Planned Interventions, Frequency, and   
Duration: Current Frequency: 2x/week  
Duration: 8 weeks  
Total Number of Visits Planned: 16  
Planned Treatment Interventions:   
Therapeutic exercise (70841);Manual   
therapy (37132);Therapeutic   
activities (10601);Self-care home   
management   
(33522);Patient/Family/Caregiver   
Education;Body Mechanics Training  
PLAN FOR NEXT VISIT: Review, correct   
and progress HEP to tolerance.   
Continue with AAROM while trying to   
avoid increased pain. Hopefully it   
will be possible in the future to   
progress therex to include AROM and   
strengthening to maximize the   
function of R UE. Use pain as a guide   
at all times. Check for results of   
orthopedic consultation that was   
requested.  
Patient demonstrates good   
understanding of plan of care and   
treatment. The above goals and plan   
of care were discussed and agreed   
upon by patient/family.  
  
  
SUBJECTIVE: Elmira Warner is a 77   
year old female seen today for   
constant pain in R shoulder and   
scapula that varies in intensity.   
This pain stems from a fall when she   
landed on R side. She reports getting   
tangled in dog leashes and fell   
22. She has had 2 sets of x-rays   
that confirmed no fracture in R   
shoulder. She reports that movement   
and use of R shoulder, especially   
reaching overhead, casuses increased   
pain. She reports inability to put   
dishes in cupboard overhead and   
cannot retrieve hot food from the   
microwave.  
Patient Goals: regain normal use of R   
UE.  
Functional Limitations: reaching   
behind back;reaching overhead;lifting   
(putting dishes away, retrieving from   
microwave, doing dishes)  
  
Prior Level of Function: Independent   
without limitations  
  
Relevant History  
Past Relevant Surgical Conditions:   
Spine fusion - Cervical  
Right or Left Handed: Right  
Employment: Retired  
Home Environment  
Patient Lives With: Self/Alone  
Home Type: [place] (basement)  
Entry To Home: Stairs;Without Rail  
Number Of Stairs Into Home: 2  
  
Intake Information: Prescription   
present  
  
Previous Treatment: None  
Falls Interview: Fall with injury in   
the last year (fell after getting   
tangled in dog leashes)  
  
  
  
  
  
  
  
  
  
  
Pain:  
Pain  
Pain Level: 3 (3/10 currently,)  
Pain Location: Shoulder -   
Right;Scapula - Right  
Frequency: Continuous (constant but   
varies in intensity)  
Detailed Pain Score: Yes  
Worst Pain Level: 9  
Average Pain Level: 3  
Best Pain Level: 1  
  
Post Treatment Pain  
Post Treatment Pain Level: 5  
Post Treatment Pain Location:   
Shoulder - Right  
Post Treatment Pain Description:   
(increased)  
Post Treatment Symptoms: After   
session today, pt reported that R   
shoulder pain had increased by 2   
points.  
  
PROMIS Scales  
Higher is Better 11/15/2022  
GH Physical - Score 37.4 (Fair)  
GH Physical - Percentile 10 %  
GH Mental - Score Incomplete  
  
T-scores: mean of general population   
= 50. 5 points is clinically   
meaningfully difference  
Percentiles provide an indication of   
how the patient's score ranks in   
relation to the general population.   
Higher percentile rankings indicate   
better function/quality of life. 50th   
percentile is the average of the   
general population and indicates half   
of respondents had a worse score.  
  
  
T-scores: mean of general population   
= 50. 5 points is clinically   
meaningfully difference  
Percentiles provide an indication of   
how the patient's score ranks in   
relation to the general population.   
Higher percentile rankings indicate   
better function/quality of life. 50th   
percentile is the average of the   
general population and indicates half   
of respondents had a worse score.  
  
OBJECTIVE MEASURES WITH LEVEL OF   
FUNCTION:  
Shoulder Observations  
R Shoulder Palpation Tenderness:   
Bicipital groove;Trapezius (sub AC   
region and posterior shoulder)  
L Shoulder Palpation Tenderness: (sub   
AC region)  
  
  
UE AROM  
R UE AROM: seated  
R Shoulder Extension: 58 Degrees  
R Shoulder Flex: 77 Degrees  
R Shoulder ABduction: 72 Degrees  
R Shoulder Internal Rotation   
(Functional): 29cm less than L   
reaching behind back  
R Shoulder External Rotation   
(Functional): 10cm less than L   
reaching behind head  
L Shoulder Extension: 83 Degrees  
L Shoulder Flex: 164 Degrees  
L Shoulder ABduction: 173 Degrees  
UE PROM  
R UE PROM: R shoulder PROM far   
exceeds AROM.  
UE and Cervical Strength  
R UE Strength: MMT deferred secondary   
pain and very limited AROM  
  
Special Tests - Shoulder  
Shoulder Special Tests: Empty   
Can;Khan-Luis Manuel  
Empty Can: Right Positive;Left   
Negative  
Khan-Luis Manuel: Right Positive;Left   
Negative  
  
  
  
  
  
  
  
  
  
  
  
Education:  
Education  
Learning Preferences:   
Demonstration;Explanation;Performance  
;Printed Materials  
Barriers: None  
Learning/educational needs: Procedure   
/ Surgery;Home exercise program;Plan   
of Care;Safety;Body Mechanics;Posture  
Education Provided: Yes, see   
treatment interventions for education   
provided  
Education Provided To: Patient  
Education Mode/Type:   
Demonstration;Explanation/Discussion;  
Literature/Printed   
Materials;Performance  
Response to Education/Teach Back:   
States/Identifies;Return   
Demonstration;Requires   
Review/Additional Education  
  
TREATMENT:  
  
PT Treatment Interventions:   
Therapeutic Exercise  
Evaluation  
Therapeutic Exercise:  
1: Pt was educated on findings of   
exam, anatomy of shoulder, likely   
source of symptoms and rationale for   
proposed treatment plan. She was   
advised to stop any exercise that   
causes increased pain. She was   
educated on her possible treatment   
options with suspected R rotator cuff   
tear. Pictures were used to clarify   
all education on anatomy. She was   
advised to stop all of the exercises   
that she had started on her own and   
only perform those issued by   
therapist.  
2: *R shoulder pendulums side to side   
2x10  
3: *R shoulder pendulums front to   
back 2x10  
4: *R shoulder pendulums CW and CCW   
2x10 each  
Skilled Intervention: Patient was   
educated in proper exercise technique   
and purpose for exercises.  
Reviewed and educated patient on   
additions/changes for home exercise   
program as above (*).  
Skilled judgment was provided in   
selection of appropriate   
interventions.  
Provided written instruction for home   
exercise program to facilitate proper   
performance and compliance.  
Correct performance of therapeutic   
exercises was facilitated with verbal   
and visual cuing.  
Patient education as noted.  
  
Billing  
* Evaluation Moderate Complexity: 1   
Unit  
Therapeutic Exercise Treatment   
Minutes: 15  
Total Treatment Time Minutes   
(timed/untimed): 45  
  
Joon Mata PT  
  
Electronically signed by Joon Mata PT at 2022 4:02 PM EST  
documented in this encounter            [facility]  
   
                                                    2022 History of   
Present illness Narrative               Formatting of this note is different   
from the original.  
Chief Complaint  
Patient presents with:  
Fall: X 2 weeks ago  
Arm Pain: Right arm  
Left Knee Pain  
Ankle Pain: Right  
  
HPI  
Elmira Warner is a 77 year old   
female who presents here today for   
Above Complaints..  
  
Today:  
2 weeks ago tomorrow-dogs got tangled   
up in their leashes and she was   
trying to undo the tangles and got   
her toe caught and fell-on concrete.   
Hit the entire right side of her   
body.  
Is still hurting. Requiring passive   
ROM to right shoulder, cannot lift   
above her shoulder. Pain starts in   
the back of her shoulder and goes   
down her arm and tingles into her   
fingers. Is a sharp shooting pain.  
Ibuprofen does help somewhat for the   
pain. Is doing her own therapy and   
stretching regularly at home. Has not   
really been icing the areas.  
Would like to have another xray of   
her shoulder and upper arm done to   
make sure no fx that were missed.  
  
Past medical history, appointments,   
medications, allergies reviewed.  
  
Previous Medical History  
PAST MEDICAL HISTORY  
Diagnosis Date  
Abdominal pain, right lower quadrant  
Abdominal pain, right upper quadrant  
Cerebrovascular disease, unspecified  
Coronary atherosclerosis of   
unspecified type of vessel, native or   
graft  
Diverticulosis of colon (without   
mention of hemorrhage)  
Essential hypertension, benign  
Intrinsic asthma with status   
asthmaticus  
Mixed hyperlipidemia  
Osteoporosis, unspecified  
Thoracic or lumbosacral neuritis or   
radiculitis, unspecified  
  
Previous Surgical History  
PAST SURGICAL HISTORY  
Procedure Laterality Date  
ANGIOPLASTY times 3  
7 stents  
APPENDECTOMY  
COLONOSCOPY FLX DX W/COLLJ SPEC WHEN   
[facility] 12 years ago  
Colonoscopy  
COLONOSCOPY FLX DX W/COLLJ SPEC WHEN   
[facility] 2012  
Colonoscopy  
HYSTERECTOMY HX  
  
Family History  
FAMILY HISTORY  
Problem Relation Age of Onset  
Stroke Father 52  
heart dx,osteoarthritis  
other (rheumatic fever) Sister  
other (rheumatic fever) Sister  
other (heart disease) Sister  
other (heart disease) Brother  
  
Patient Allergies  
ALLERGIES  
Allergen Reactions  
Adhesive Tape-Silic* Rash  
Demerol [Meperidine* Vomiting  
migraine  
Influenza Vaccine T* Diarrhea  
vomiting/fever  
Iodine Other: See Comments  
sick /headaches  
Penicillins Hives  
throat and mouth swelling  
Shellfish Hives  
  
Current Medications  
Current Outpatient Medications on   
File Prior to Visit  
Medication Sig  
lisinopril (ZESTRIL, PRINIVIL) 5 mg   
tablet Take 0.5 tablets by mouth once   
daily.  
rosuvastatin (CRESTOR) 5 mg tablet   
Take 1 tablet by mouth daily at   
bedtime.  
albuterol (PROVENTIL) 2.5 mg /3 mL   
(0.083 %) nebulizer solution Use 3 mL   
via nebulizer every 6 hours as   
needed.  
meloxicam (MOBIC) 15 mg tablet Take 1   
tablet by mouth once daily. Take with   
food.  
vit B complex no.12/niacin,B3,   
(VITAMIN B COMPLEX NO.12-NIACIN ORAL)   
Take by mouth.  
TUMERIC-GING-OLIVE-OREG-CAPRYL ORAL   
Take by mouth.  
zinc sulfate (ZINC-15 ORAL) Take by   
mouth.  
levalbuterol tartrate HFA (XOPENEX   
HFA) 45 mcg/actuation inhaler Inhale   
2 Puffs as instructed every 6 hours   
as needed.  
Nebulizers ([employer] GO NEBULIZER) 1   
Each as directed. Portable Nebulizer   
with tubing, Dx: mild persistent   
asthma  
magnesium oxide (MAG-OX) 400 mg   
(241.3 mg magnesium) tablet Take 400   
mg by mouth once daily.  
Lactobac no.41/Bifidobact no.7   
(PROBIOTIC-10 ORAL) Take by mouth.  
nitroglycerin sublingual (NITROSTAT)   
0.4 mg SL tablet Dissolve 1 tablet   
under the tongue every 5 minutes as   
needed for Chest Pain. Max of 3   
doses. If no relief, call 911.  
potassium chloride (K-TAB) 10 mEq   
tablet Take 1 tablet by mouth daily   
with breakfast.  
MULTIVITAMIN (VITAMIN A DAY ORAL)   
Take by mouth.  
Cholecalciferol, Vitamin D3, 25 mcg   
(1,000 unit) cap Take 1,000 Units by   
mouth once daily.  
Ascorbic Acid 1,000 mg tablet Take   
1,000 mg by mouth once daily.  
Aspirin 81 mg Tab Take 81 mg by mouth   
once daily.  
  
GLUC COLBERT/CHONDRO COLBERT A/VIT C/MN   
(GLUCOSAMINE 1500 COMPLEX ORAL) Take   
by mouth.  
  
CALCIUM CARBONATE/VITAMIN D3 (CALCIUM   
+ D ORAL) Take by mouth.  
oxyCODONE-acetaminophen (PERCOCET)   
5-325 mg tablet as needed for pain.   
(Patient not taking: Reported on   
2022)  
Bisacodyl (DULCOLAX) 5 mg tab Use as   
directed for Miralax / Gatorade Bowel   
Prep Kit (Patient not taking:   
Reported on 2022)  
ubidecarenone Q-10 (COENZYME Q-10) 10   
mg cap Take 10 mg by mouth once   
daily. (Patient not taking: Reported   
on 2022)  
Potassium 99 mg tab Take 1 tablet by   
mouth once daily. (Patient not   
taking: No sig reported)  
  
No current facility-administered   
medications on file prior to visit.  
  
Social History  
Social History  
  
Tobacco Use  
Smoking status: Never  
Smokeless tobacco: Never  
Vaping Use  
Vaping Use: Never used  
Substance Use Topics  
Alcohol use: Never  
Drug use: No  
  
Review of Symptoms  
REVIEW OF SYSTEMS  
See HPI, otherwise negative  
  
EXAM:  
/104 (BP Site: Left Arm, BP   
Position: Sitting, BP Cuff Size:   
Regular Adult)   Pulse (!) 121   Wt   
75.5 kg (166 lb 6.4 oz)   SpO2 98%     
BMI 29.48 kg/m  
  
General Appearance: Well appearing,   
alert, in no acute distress,   
well-hydrated, well nourished..  
Lungs: Lungs clear to auscultation.   
No wheezing, rhonchi, rales..  
Heart: RRR without murmur, gallop, or   
rubs. No ectopy.  
Musculoskeletal: pain with passive   
ROM to posterior shoulder, mild   
generalized tenderness to palpation   
of right shoulder and upper arm, no   
obvious deformity, small healing   
yellow bruise to the inside of her   
right upper arm.  
  
Health Maintenance List  
COVID-19 VACCINE(1) Never done  
PNEUMOCOCCAL: 65+(1 - PCV) Never done  
SPIROMETRY Never done  
HEPATITIS C SCREENING Never done  
BP CONTROLLED (<130/80) Never done  
SHINGRIX VACCINE(1 of 2) Never done  
ADVANCE DIRECTIVE DISCUSSION Never   
done  
DEPRESSION ASSESSMENT Never done  
INFLUENZA(1) Never done  
LDL CHOLESTEROL due on 2022  
DTAP,TDAP,TD(1 - Tdap) due on   
04/15/2023  
ANNUAL PCP TEAM CHRONIC DISEASE VISIT   
due on 04/15/2023  
DIABETES SCREEN due on 2025  
BONE DENSITY Completed  
  
Data reviewed  
Previous records, office notes  
  
ASSESSMENT/PLAN:  
1. Injury of right shoulder,   
subsequent encounter - ICD9: V58.89,   
959.2, ICD10: S49.91XD (primary   
diagnosis)  
Suspect bruising, do not suspect fx,   
but will visualize with additional   
xrays.  
Naproxen.  
Ice.  
Rest, but continue ROM.  
Recommend PT-patient unsure if she   
would like to do this or not.  
- XR SHOULDER GENERAL 3V OR MORE   
AP/TRUE AP/OTHER RIGHT  
- XR HUMERUS 2V AP/LAT RIGHT  
- XR CERV OTHER 4V AP/LAT/OBL  
- CONSULT TO PHYSICAL THERAPY  
  
2. Fall, subsequent encounter - ICD9:   
V58.89, E888.9, ICD10: W19.XXXD  
Suspect bruising, do not suspect fx,   
but will visualize with additional   
xrays.  
Naproxen.  
Ice.  
Rest, but continue ROM.  
Recommend PT-patient unsure if she   
would like to do this or not.  
- XR SHOULDER GENERAL 3V OR MORE   
AP/TRUE AP/OTHER RIGHT  
- XR HUMERUS 2V AP/LAT RIGHT  
- XR CERV OTHER 4V AP/LAT/OBL  
- CONSULT TO PHYSICAL THERAPY  
  
3. Neck pain, chronic - ICD9: 723.1,   
338.29, ICD10: M54.2, G89.29  
Suspect bruising, do not suspect fx,   
but will visualize with additional   
xrays.  
Naproxen.  
Ice.  
Rest, but continue ROM.  
Recommend PT-patient unsure if she   
would like to do this or not.  
- XR SHOULDER GENERAL 3V OR MORE   
AP/TRUE AP/OTHER RIGHT  
- XR HUMERUS 2V AP/LAT RIGHT  
- XR CERV OTHER 4V AP/LAT/OBL  
- CONSULT TO PHYSICAL THERAPY  
  
4. Bilateral leg pain - ICD9: 729.5,   
ICD10: M79.604, M79.605  
Suspect chronic PVD, but with   
bilateral leg pain, will r/o DVTs.  
- US DVT LOWER BILAT  
- US LEG VEIN DVT UNL VAS LAB  
  
UMER King.CNP  
  
  
  
Electronically signed by Maria E Guzmán APRN.CNP at 2022 1:02   
PM EST  
documented in this encounter            [facility]  
   
                                                    2022 Miscellaneous   
Notes                                   Formatting of this note might be   
different from the original.  
Called pt scheduled for Monday.  
Electronically signed by Dianna Yu LPN at 2022 9:13 AM EST  
Formatting of this note might be   
different from the original.  
She needs to schedule an appointment   
to have this assessed.  
UMER King.BLANE  
  
Electronically signed by Maria E Guzmán APRN.CNP at 11/10/2022 5:39   
PM EST  
Formatting of this note might be   
different from the original.  
Patient fell at home 2 weeks ago and   
now is having issues and pain with   
her arms/back/hips. Patient asking   
for an xray to be ordered. Also   
patient asking for refill of   
Lisinopril.[facility]   
[facility]armacy.  
Electronically signed by Frances Mendoza Pss at 11/10/2022 11:00 AM EST  
documented in this encounter            [facility]  
   
                                                    2022 Miscellaneous   
Notes                                   Formatting of this note might be   
different from the original.  
Last office visit: 04/15/22  
Next appointment scheduled: Not   
scheduled at this time  
Last labs: 2022  
  
Electronically signed by Dorene Swann LPN at 2022 11:24 AM   
EDT  
Formatting of this note is different   
from the original.  
Patient has been identified by name   
and date of birth: Yes  
  
Requested Prescriptions  
  
Pending Prescriptions Disp Refills  
lisinopril (ZESTRIL, PRINIVIL) 5 mg   
tablet 45 tablet 3  
Sig: Take 0.5 tablets by mouth once   
daily.  
  
RX INSTRUCTIONS: Patient has been out   
of medication for the past three   
days.  
She is taking One full tablet when BP   
is high - causing her to run out   
early  
  
Patient aware RX will be sent to   
pharmacy. No need to notify patient.  
  
Susi Garcia  
Electronically signed by Susi Garcia at 2022 11:18 AM EDT  
documented in this encounter            [facility]  
   
                                                    2022 Miscellaneous   
Notes                                   Formatting of this note is different   
from the original.  
Patient's request for medication is   
as follows:  
Requested Prescriptions  
  
Pending Prescriptions Disp Refills  
rosuvastatin (CRESTOR) 5 mg tablet 90   
tablet 3  
Sig: Take 1 tablet by mouth daily at   
bedtime.  
  
Last visit 22. Next visit   
22.  
  
Prescription(s) as above. Please   
process accordingly.  
Sujata Zayas RN  
  
Electronically signed by Sujata Zayas RN at 2022 11:36 AM EDT  
documented in this encounter            [facility]  
   
                                                    2022 Miscellaneous   
Notes                                   Formatting of this note might be   
different from the original.  
Called patient. No answer- left   
message and informed MyChart message   
sent. Eleanor Brandt LPN  
  
Electronically signed by Eleanor Brandt LPN at 2022 9:43 AM EDT  
Formatting of this note might be   
different from the original.  
Called pt and left message. Box needs   
shipped to:  
  
[employer]  
Attn: Intake  
[address] [id number]  
[place] [address]  
  
Please let pt know this when she   
returns call. Thank you. Sintia Pastor RN  
  
Electronically signed by Sintia Pastor RN at 2022 1:30 PM   
EDT  
Formatting of this note might be   
different from the original.  
Patient called. Verified name and   
date of birth. Patient states she had   
 patch  applied 2022 and   
wore it for two weeks but when   
preparing to ship device the label   
was smeared and address not legible.   
Please let patient know which address   
to ship patch to. Eleanor Brandt LPN  
  
Electronically signed by Eleanor Brandt LPN at 2022 11:31 AM EDT  
documented in this encounter            [facility]  
   
                                        2022 Instructions   
  
  
Irlanda Chan MD - 2022   
8:40 AM EDT  
  
  
Formatting of this note might be   
different from the original.  
Repeat fasting Blood Work  
We will schedule you for an event   
monitor  
Follow up with Jolene in July  
  
  
Electronically signed by Irlanda Chan MD at 2022 8:43 AM EDT  
documented in this encounter            [facility]  
   
                                                    2022 History of   
Present illness Narrative                 
  
  
Formatting of this note is different   
from the original.  
Images from the original note were   
not included.  
  
  
  
[facility]  
  
SECTION OF REGIONAL CARDIOLOGY  
Cardiology ([address])  
[address] [facility] [id number] [phone]  
  
OUTPATIENT VISIT DATE  
2022  
  
PRIMARY CARE PHYSICIAN:  
Bebeto Rodriguez  
[address] [place] [address]  
Phone: [phone]  
Fax: [phone]  
  
HISTORY OF PRESENT ILLNESS:  
Ms. Warner is a 77 year old female   
who presents today for a   
cardiovascular medicine follow-up   
visit. She has a history of coronary   
artery disease prior inferior wall   
myocardial infarction  and   
multiple coronary interventions with   
history of at least 7 stents placed.   
She is also treated for hypertension   
and dyslipidemia. She recently   
underwent surgery for cervical spinal   
stenosis. She says she has been doing   
well after her surgery. She feels   
remarkably better. She complains of   
occasional episodes of chest pressure   
which are not necessarily related to   
exertion and are intermittent in   
nature. She cannot recall any   
exacerbating relieving factors. She   
has not had symptoms concerning for   
CHF including PND, orthopnea, or   
lower extremity edema. She feels   
occasional irregular heartbeats that   
make her fatigued. She has had these   
for many years. She has had no recent   
assessment.  
  
PAST CARDIAC HISTORY:  
From Last OV with Jolene Liang CNP   
3/16/2022  
Elmira Warner is a 77 year old   
female who presents for cardiology   
evaluation. She has a past medical   
history of CAD (s/p MI in , s/p   
THEA to the LAD, LCX and RCA and more   
recently THEA to RCA in ), CVA,   
HTN, HLD. She previously followed   
with a physician at [facility].   
Her most recent ischemic evaluation   
was with stress testing in 2019   
without suggestion of ischemia. Her   
daughter is called over the phone for   
her office visit. She explains she   
has been having severe neck pain for   
the past couple of months even   
causing daily headaches. She is   
planned for a MRI and surgical   
evaluation with [facility]. She would also   
like a second opinion at [facility]. She is minimally   
active and limited by her pain. She   
does deny any significant cardiac   
complaints, chest pain, chest pain   
with activity, SOB, palpitations,   
dizziness, syncope, orthopnea, LE   
swelling. She does however, endorse   
fatigue. She reports taking   
medications as prescribed and is   
agreeable to addition of statin. If   
she is planned for upcoming spinal   
surgery, I suggest repeating a   
medication stress test as she is not   
able to exert herself 4 METs at home.  
  
PAST MEDICAL HISTORY  
Diagnosis Date  
Abdominal pain, right lower quadrant  
Abdominal pain, right upper quadrant  
Cerebrovascular disease, unspecified  
Coronary atherosclerosis of   
unspecified type of vessel, native or   
graft  
Diverticulosis of colon (without   
mention of hemorrhage)  
Essential hypertension, benign  
Intrinsic asthma with status   
asthmaticus  
Mixed hyperlipidemia  
Osteoporosis, unspecified  
Thoracic or lumbosacral neuritis or   
radiculitis, unspecified  
  
PAST SURGICAL HISTORY  
Procedure Laterality Date  
ANGIOPLASTY times 3  
7 stents  
APPENDECTOMY  
COLONOSCOPY FLX DX W/COLLJ SPEC WHEN   
PFRMD 12 years ago  
Colonoscopy  
COLONOSCOPY FLX DX W/COLLJ SPEC WHEN   
PFRMD 2012  
Colonoscopy  
HYSTERECTOMY HX  
  
SOCIAL HISTORY  
Social History  
  
Tobacco Use  
Smoking status: Never Smoker  
Smokeless tobacco: Never Used  
Vaping Use  
Vaping Use: Never used  
Substance Use Topics  
Alcohol use: Never  
Drug use: No  
  
FAMILY HISTORY  
Problem Relation Age of Onset  
Stroke Father 52  
heart dx,osteoarthritis  
other (rheumatic fever) Sister  
other (rheumatic fever) Sister  
other (heart disease) Sister  
other (heart disease) Brother  
  
ALLERGIES:  
ALLERGIES  
Allergen Reactions  
Adhesive Tape-Silic* Rash  
Demerol [Meperidine* Vomiting  
migraine  
Influenza Vaccine T* Diarrhea  
vomiting/fever  
Iodine Other: See Comments  
sick /headaches  
Penicillins Hives  
throat and mouth swelling  
Shellfish Hives  
  
MEDICATIONS:  
  
oxyCODONE-acetaminophen (PERCOCET)   
5-325 mg tablet as needed for pain.  
polyethylene glycol 3350 (MIRALAX) 17   
gram/dose powder Take 17 g by mouth   
as needed for constipation.  
albuterol (PROVENTIL) 2.5 mg /3 mL   
(0.083 %) nebulizer solution Use 3 mL   
via nebulizer every 6 hours as   
needed.  
rosuvastatin (CRESTOR) 5 mg tablet   
Take 1 tablet by mouth daily at   
bedtime.  
meloxicam (MOBIC) 15 mg tablet Take 1   
tablet by mouth once daily. Take with   
food.  
lisinopril (ZESTRIL, PRINIVIL) 5 mg   
tablet Take 0.5 tablets by mouth once   
daily.  
levalbuterol tartrate HFA (XOPENEX   
HFA) 45 mcg/actuation inhaler Inhale   
2 Puffs as instructed every 6 hours   
as needed.  
nitroglycerin sublingual (NITROSTAT)   
0.4 mg SL tablet Dissolve 1 tablet   
under the tongue every 5 minutes as   
needed for Chest Pain. Max of 3   
doses. If no relief, call 911.  
Aspirin 81 mg Tab Take 81 mg by mouth   
once daily.  
vit B complex no.12/niacin,B3,   
(VITAMIN B COMPLEX NO.12-NIACIN ORAL)   
Take by mouth.  
TUMERIC-GING-OLIVE-OREG-CAPRYL ORAL   
Take by mouth.  
zinc sulfate (ZINC-15 ORAL) Take by   
mouth.  
Bisacodyl (DULCOLAX) 5 mg tab Use as   
directed for Miralax / Gatorade Bowel   
Prep Kit  
Nebulizers (AERONEB GO NEBULIZER) 1   
Each as directed. Portable Nebulizer   
with tubing, Dx: mild persistent   
asthma  
ubidecarenone Q-10 (CO Q-10) 10 mg   
cap Take 10 mg by mouth once daily.  
magnesium oxide (MAG-OX) 400 mg   
(241.3 mg magnesium) tablet Take 400   
mg by mouth once daily.  
Potassium 99 mg tab Take 1 tablet by   
mouth once daily.  
Lactobac no.41/Bifidobact no.7   
(PROBIOTIC-10 ORAL) Take by mouth.  
potassium chloride (K-TAB) 10 mEq   
tablet Take 1 tablet by mouth daily   
with breakfast.  
MULTIVITAMIN (VITAMIN A DAY ORAL)   
Take by mouth.  
Cholecalciferol, Vitamin D3, (VITAMIN   
D) 1,000 unit Cap Take 1,000 Units by   
mouth once daily.  
Ascorbic Acid (VITAMIN C) 1,000 mg   
tablet Take 1,000 mg by mouth once   
daily.  
GLUC COLBERT/CHONDRO COLBERT A/VIT C/MN   
(GLUCOSAMINE 1500 COMPLEX ORAL) Take   
by mouth.  
CALCIUM CARBONATE/VITAMIN D3 (CALCIUM   
+ D ORAL) Take by mouth.  
  
REVIEW OF SYSTEMS:  
Review of Systems  
Constitutional: Positive for   
malaise/fatigue. Negative for chills,   
fever and weight loss.  
HENT: Negative for hearing loss and   
sore throat.  
Eyes: Negative for blurred vision and   
double vision.  
Respiratory: Negative.  
Cardiovascular: Negative.  
Genitourinary: Negative for dysuria,   
frequency, hematuria and urgency.  
Musculoskeletal: Positive for neck   
pain.  
Skin: Negative.  
Neurological: Negative for dizziness,   
seizures, loss of consciousness,   
weakness and headaches.  
Endo/Heme/Allergies: Negative for   
environmental allergies. Does not   
bruise/bleed easily.  
Psychiatric/Behavioral: Negative for   
depression.  
  
  
PHYSICAL EXAMINATION:  
/62   Pulse 112   Wt 161 lb   
(73.0kg)   SpO2 97%  
General: Pleasant woman sitting   
comfortable no apparent distress. She   
is alert and oriented x3  
HEENT: Limited examination secondary   
to cervical collar  
Pulmonary: Lungs are clear no rales,   
wheezes, rhonchi  
Cardiovascular: Normal S1, S2 with   
regular rate and rhythm. No murmurs,   
rubs, or gallops  
Extremities: Warm, well-perfused, no   
lower extremity edema. 1-2+ distal   
pulses.  
  
CARDIOVASCULAR MEDICINE TESTING:  
  
Regadenoson Myoview Stress 2022:  
CONCLUSIONS:  
1. SPECT Perfusion Study: Normal.  
2. There is no scintigraphic evidence   
for inducible ischemia.  
3. No evidence of scarred myocardium.   
There is a moderate sized apparent  
perfusion defect of the inferior mid   
and basilar segments that contracts  
normally favoring artifact.  
4. Left ventricle is normal in size.   
The left ventricle systolic  
function is normal.  
5. Right ventricle is normal in size.   
The right ventricle systolic  
function is normal.  
6. This is a low risk scan.  
  
Gated Stress FBP  
LVEF % 77  
  
2019  
Nuclear portion of the exam:  
1. No evidence of ischemia  
2. There is a moderate sized,   
moderate severity perfusion defect of   
the  
base to mid inferior and   
inferolateral walls that improves   
with prone  
imaging that is likely artifact.  
3. Normal left ventricular size and   
function with an ejection fraction of  
65%.  
  
IMPRESSION:  
Ms. Warner is a 77 year old woman   
with a history of coronary artery   
disease (s/p MI in , s/p THEA to   
the LAD, LCX and RCA and more   
recently THEA to RCA in ),   
hypertension, dyslipidemia who   
presents to the office for routine   
follow-up.  
  
PLAN AND RECOMMENDATIONS:  
1. Coronary artery disease involving   
native coronary artery of native   
heart without angina pectoris - ICD9:   
414.01, ICD10: I25.10 (primary   
diagnosis)  
Extensive cardiac history. Recent low   
risk stress test done prior to   
surgery. She has atypical chest   
pressure symptoms unlikely related to   
obstructive coronary disease.   
Continue to follow clinically.  
  
2. Essential hypertension - ICD9:   
401.9, ICD10: I10  
Well-controlled on current regimen  
  
3. Hyperlipidemia, mixed - ICD9:   
272.2, ICD10: E78.2  
Patient with a prior history of   
statin intolerance. She is currently   
maintained on Crestor 5 mg daily. She   
has some degree of nausea with her   
medication. I have asked her to start   
taking it in the morning with her   
meals. Plan to repeat fasting lipid   
panel before next office visit  
  
4. Palpitations - ICD9: 785.1, ICD10:   
R00.2  
Patient complaining of irregular   
heartbeats. We will fit her with   
14-day Zio patch monitor once her   
cervical collar is removed. Plan for   
follow-up after testing is completed.  
- OUTSIDE VENDOR CARDIAC OUTPATIENT   
EXTENDED RHYTHM RECORDING (WITHOUT   
TELEMETRY)  
  
Irlanda Chan MD  
  
  
  
Electronically signed by Irlanda Chan MD at 2022 8:48 AM   
EDTdocumented in this encounter         [facility]  
   
                                                    2022 History of   
Present illness Narrative                 
  
  
Formatting of this note is different   
from the original.  
Images from the original note were   
not included.  
  
  
Hospitalist Progress Note  
2022 12:11 PM  
  
[id number]: Please page me for patient   
care issues.  
[phone]: Please page IMS night   
Hospitalist for any issues.  
Subjective:  
Admit Date: 2022 PCP: Bebeto Rodriguez  
Room#: 0728/636250  
  
Interval History: Patient doing good   
currently. No chest pain or sob. No   
NV. No fevers or chills.  
  
ADULT DIET; Clear Liquid  
Patient Vitals for the past 96 hrs   
(Last 3 readings):  
Weight  
22 0555 164 lb (74.4 kg)  
  
Medications:  
  
sodium chloride  
  
[Held by provider] aspirin 162 mg   
Oral Once  
[Held by provider] clopidogrel 75 mg   
Oral Daily  
lisinopril 2.5 mg Oral Daily  
rosuvastatin 5 mg Oral Nightly  
sodium chloride flush 5-40 mL   
IntraVENous 2 times per day  
sennosides-docusate sodium 1 tablet   
Oral Daily  
dexamethasone 6 mg IntraVENous Q6H  
methocarbamol 1,000 mg IntraVENous   
Q8H  
  
LABS:  
CBC:  
Recent Labs  
22  
0421  
WBC 19.5*  
RBC 3.86  
HGB 10.1*  
HCT 31.6*  
MCV 81.7  
RDW 17.3*  
  
  
BMP:  
Recent Labs  
22  
0421  
  
K 4.1  
  
CO2 25  
BUN 15  
CREATININE 0.89  
GLUCOSE 129*  
CALCIUM 10.5*  
ANIONGAP 8  
  
LIVER PROFILE:No results for   
input(s): AST, ALT, BILITOT, ALKPHOS,   
LABALBU, PROT in the last 72 hours.  
PT/INR: No results for input(s):   
PROTIME, INR in the last 72 hours.  
CARDIAC ENZYMES: No results for   
input(s): TROPONINI in the last 72   
hours.  
Procalcitonin: No results found for:   
PROCAL  
Objective:  
Vitals: BP (!) 144/78   Pulse 122     
Temp 97 F (36.1 C) (Temporal)   Resp   
20   Ht 5' 4  (1.626 m)   Wt 164 lb   
(74.4 kg)   SpO2 95%   BMI 28.15 kg/m  
Pulse Ox: SpO2 Av.6 % Min: 95 %   
Max: 100 %  
Supplemental O2: O2 Flow Rate   
(L/min): 2 L/min  
General appearance: No apparent   
distress, appears stated age and   
cooperative with exam  
HEENT: Eyes: No scleral icterus  
Oral: Tongue is semi-moist  
Cardiovascular: S1S2 heard, RRR  
Respiratory: Clear to auscultation   
bilaterally  
Abdomen: Soft, non-tender,   
non-distended with normal bowel   
sounds.  
Musculoskeletal: No obvious   
deformities seen  
Skin: No visible rashes or lesions.  
Assessment  
# Cervical myelopathy - S/P C4-7 ACDF   
per Ortho on .  
# Tachycardia - this am that   
resolved. Daughter/patient states   
does this from time to time at home   
and feels its due to anxiety (has hx   
of this). Daughter did record her   
rate/rhythm on apple watch EKG. No   
a.fib seen, had tachy with some PVCs.   
Currently normal rhythm on exam.  
# Chronic HTN -resume Prinivil  
# HLD - resumed Crestor  
# Hx of anxiety disorder  
# CAD with hx of 7 stents -   
ASA/plavix held per Ortho. Resume   
only when okay with ortho  
# DVT proph: PCDs  
Plan  
Ortho discharging home. Okay for   
discharge from medicine standpoint.   
Doing very good post op other than   
transient tachy this am (see above).  
  
Advance Directive: Prior  
  
Discharge planning: CHRIS LIMA MD, MD  
Division of Hospitalist Medicine  
Inpatient Medical Services  
PAGER: [phone]  
  
  
  
  
Electronically signed by Dandre Lima MD   
at 2022 12:14 PM EDT  
  
  
Formatting of this note is different   
from the original.  
Physical Therapy  
Facility/Department: [facility] TELEMETRY  
Physical Therapy Initial Assessment  
  
Name: Elmira Warner  
: 1945  
MRN: [id number]  
Date of Service: 2022  
  
Discharge Recommendations:  
Home with assist PRN  
PT Equipment Recommendations  
Equipment Needed: No  
  
Patient Diagnosis(es): The encounter   
diagnosis was Status post spinal   
surgery.  
Past Medical History: has a past   
medical history of Anxiety,   
Arthritis, Asthma, CAD (coronary   
artery disease), History of blood   
transfusion, Hyperlipidemia,   
Hypertension, PONV (postoperative   
nausea and vomiting), and Prolonged   
emergence from general anesthesia.  
Past Surgical History: has a past   
surgical history that includes   
Appendectomy; Ectopic pregnancy   
surgery; Hysterectomy (); Cardiac   
surgery (); and eye surgery   
(Bilateral).  
  
Assessment  
Body Structures, Functions, Activity   
Limitations Requiring Skilled   
Therapeutic Intervention: Decreased   
functional mobility ;Decreased   
strength;Decreased   
endurance;Decreased vision/visual   
deficit  
Assessment: Elmira Warner was   
admitted on 22 for an ACDF C4-7.   
She is wearing a c-collar. She is   
complaining of right shoulder/arm   
pain. She ambulated in the halls with   
PT and did well. She does have it set   
up for her relatives to come and stay   
with her upon homegoing. She is not   
sleeping well, but is moving well.   
Anticipate home with assist as needed   
upon discharge.  
Treatment Diagnosis: decreased   
functional independence  
Decision Making: Low Complexity  
Activity Tolerance  
Activity Tolerance: Patient limited   
by fatigue;Patient limited by   
endurance  
  
Plan  
Plan  
Plan: 5-7 times per week  
Plan weeks: 1  
Current Treatment Recommendations:   
Balance training,Functional mobility   
training,Transfer training,Stair   
training,Gait training  
Safety Devices  
Type of Devices: Patient at risk for   
falls,Left in bed  
Restraints  
Restraints Initially in Place: No  
  
Restrictions  
Restrictions/Precautions  
Restrictions/Precautions: Up as   
Tolerated,General Precautions  
Required Braces or Orthoses?: Yes  
Required Braces or Orthoses  
Cervical: c-collar  
Spinal Other: no lifting more than   
10-15 lbs no repetitive overhead   
activities  
  
Subjective  
General  
Chart Reviewed: Yes  
Patient assessed for rehabilitation   
services?: Yes  
Response To Previous Treatment: Not   
applicable  
Family / Caregiver Present: Yes   
(daughter)  
Diagnosis: s/p ACDF C4-7 on 22   
Right arm is painful at this time.  
Follows Commands: Within Functional   
Limits  
General Comment  
Comments: States she is tired, did   
not sleep well with this c-collar on.   
She just got back to bed after   
sitting at the edge of the bed for an   
hour and a half at the edge of the   
bed.  
Subjective  
Subjective: Discussed c-collar safety   
with her. Answered multiple questions   
about her recovery.  
  
  
Social/Functional History  
Social/Functional History  
Lives With: Alone  
Type of Home: House  
Home Layout: One level  
Home Access: Stairs to enter with   
rails  
Entrance Stairs - Number of Steps: 3  
Entrance Stairs - Rails: Right  
Receives Help From: Family,Friend(s)  
ADL Assistance: Needs assistance  
Homemaking Assistance: Needs   
assistance  
Homemaking Responsibilities: Yes  
Ambulation Assistance: Independent   
(holds onto furniture at times.)  
Transfer Assistance: Independent  
Active : Yes  
Mode of Transportation: Car  
Occupation: Retired  
Vision/Hearing  
  
Cognition  
  
  
Objective  
  
  
  
  
AROM RLE (degrees)  
RLE AROM: WFL  
AROM LLE (degrees)  
LLE AROM : WFL  
Strength RLE  
Comment: 4/5  
Strength LLE  
Comment: 4/5  
  
  
  
Bed mobility  
Rolling to Right: Supervision  
Supine to Sit: Minimal assistance  
Sit to Supine: Minimal assistance  
Scooting: Contact guard assistance  
Transfers  
Sit to Stand: Stand by assistance  
Stand to sit: Stand by assistance  
Bed to Chair: Stand by assistance  
Ambulation  
WB Status: no restrictions  
Ambulation  
Surface: level tile  
Device: No Device;Hand-Held Assist   
(Pt. prefers PT to be on her left   
side.)  
Assistance: Contact guard assistance  
Gait Deviations: Slow   
Princess;Decreased step length  
Distance: 100'  
More Ambulation?: Yes  
Ambulation 2  
Surface - 2: level tile  
Device 2: No device  
Assistance 2: Contact guard   
assistance  
Gait Deviations: Slow   
Princess;Decreased step height  
Distance: 75'  
  
Balance  
Posture: Good  
Sitting - Static: Good  
Sitting - Dynamic: Good  
Standing - Static: Fair;+  
Standing - Dynamic: Fair  
  
  
  
OutComes Score  
  
  
  
  
  
  
AM-PAC Score  
AM-PAC Inpatient Mobility Raw Score :   
17 (22)  
AM-PAC Inpatient T-Scale Score :   
42.13 (22)  
Mobility Inpatient CMS 0-100% Score:   
50.57 (22)  
Mobility Inpatient CMS G-Code   
Modifier : CK (22)  
  
  
  
Goals  
Short Term Goals  
Time Frame for Short term goals: 1   
week  
Short term goal 1: bed mobility with   
modif.indep.  
Short term goal 2: transfers with   
modif. indep.  
Short term goal 3: gait x 150' indep.  
Short term goal 4: stairs x 3 with   
modif. indep.  
Patient Goals  
Patient goals : to go home, have less   
discomfort  
  
  
Therapy Time  
Individual Concurrent Group   
Co-treatment  
Time In 910  
Time Out 0940  
Minutes 30  
Timed Code Treatment Minutes: 15   
Minutes  
  
  
Lanette Veliz PT  
  
  
  
  
  
Electronically signed by Lanette Veliz PT at 2022 9:54 AM EDT  
  
  
Formatting of this note might be   
different from the original.  
Elmira Warner was ordered   
potassium gluconate and Saccharomyces   
(Florastor) Per [facility] [facility]   
Policy #4005, herbals and certain   
dietary supplements are automatically   
discontinued for the duration of the   
hospital stay. The product remains on   
the Home Medication List for   
resumption at discharge unless   
specifically discontinued by the   
prescriber.  
If there is a need for acute   
treatment using this agent, please   
contact the pharmacy for further   
assistance.  
  
Palak Villatoro RPh  
  
  
Electronically signed by Palak Villatoro RPH at 2022 9:47 AM   
EDT  
  
  
Formatting of this note is different   
from the original.  
Images from the original note were   
not included.  
  
Orthopaedic Spine  
Progress Note  
  
Name: Elmira Warner  
MRN: [id number]  
YOB: 1945 Age: 77 y.o.  
Admission Date/Time: 2022 5:36   
AM  
  
Assessment:  
Elmira Warner is a 77 y.o. female   
s/p C4-7 ACDF   
  
Plan:  
  
-No plans for return to OR  
-Hard Cervical collar, OK to remove   
during hygiene and eating.  
-Dressing to be left C/D/ for 7 days   
- replace PRN for saturation w/   
tegaderm and 4x4s  
-CBC/BMP Daily  
-Diet: Clear liquids, AAT to general   
diet  
-Decadron 6mg q6hr x 3 doses  
-Antibiotics x 24 hours post-op  
-Robaxin 1500mg TID  
-May resume ASA POD#1  
-Multimodal pain control  
-WBAT; up w/ assistance  
-Continuous pulse ox x24hrs  
-PT/OT  
-Consult Medicine  
-No spinal precautions  
-NV checks q4 hrs  
-SCD's  
-Skin checks q shift  
-Admit to H6  
-Activity restrictions-avoid lifting   
greater than 10-15lbs and no   
repetitive overhead activities  
-Ortho to follow  
-Dispo: plan for dc today if pain is   
controlled, tolerating diet, and   
passes PT/OT  
  
Subjective:  
Patient reports that her pain into   
her hands has improved significantly   
and she believes that they are   
working better. She reports a sore   
throat this morning and that it hurts   
to swallow. Denies any difficulties   
breathing.  
  
Objective  
  
Most Recent Vitals:  
Vitals:  
22 1330 22 1658 22   
2133 22 0506  
BP: 138/73 (!) 148/76 (!) 142/85 (!)   
141/96  
Pulse: 82 83 81 121  
Resp: 16 18 18 18  
Temp: 96.1 F (35.6 C) 96.7 F (35.9 C)   
97.3 F (36.3 C) 98.2 F (36.8 C)  
TempSrc: Temporal Temporal Temporal   
Temporal  
SpO2: 98% 98% 97% 97%  
Weight:  
Height:  
  
Intake/Output last 24 hours:  
I/O last 3 completed shifts:  
In: 1100 [I.V.:1100]  
Out: 150 [Urine:100; Blood:50]  
I/O this shift:  
In: -  
Out: 450 [Urine:450]  
  
  
  
Recent Labs  
22  
WBC 19.5*  
HGB 10.1*  
HCT 31.6*  
MCV 81.7  
  
Recent Labs  
22  
042  
  
K 4.1  
  
CO2 25  
  
Gen: No acute distress. Alert and   
oriented  
Neck: C/D/I in collar  
Chest: Normal respiratory effort.   
Unlabored breathing  
Abd: Soft, non-tender, non-distended  
Extremity:  
  
Upper Extremity Motor:  
Del Bi Tri WE WF Int FF  
Right 4 4+ 4 5 5 4+ 5  
Left 4+ 4 5 5 5 4+ 5  
  
Lower Extremity Motor:  
HF Q TA EHL GSC  
Right 5 5 5 5 5  
Left 5 5 5 5 5  
  
Upper extremity sensation to light   
touch:  
C5 C6 C7 C8 T1  
Right Intact Intact Intact Intact   
Intact  
Left Intact Intact Intact Intact   
Intact  
  
Lower extremity sensation to light   
touch:  
L2 L3 L4 L5 S1  
Right Intact Intact Intact Intact   
Intact  
Left Intact Intact Intact Intact   
Intact  
  
Wound: Dressing CDI. No evidence of   
hematoma. No erythema around   
dressing.  
  
  
Karri Rodarte MD  
2022  
6:19 AM  
  
  
  
  
Electronically signed by Karri Rodarte MD at 2022 6:22 AM EDT  
  
  
Formatting of this note might be   
different from the original.  
  
  
  
Electronically signed by Dov Crowe MD at 2022 8:31 AM   
EDTdocumented in this encounter         [facility]  
Work Phone:   
[phone]  
   
                                        2022 Note     Procedure Note  
Name: Elmira Warner  
MRN: [id number]  
YOB: 1945 Age: 77 y.o.  
Primary Care Physician: Bebeto Rodriguez  
Admission Date/Time: 2022 5:36   
AM  
Date of Procedure: 2022  
Attending Surgeon: Laura Tinajero MD  
Assistant: Karri Rodarte MD PGY-2  
Preoperative Diagnosis:  
1. Neck pain  
2. Cervical radiculopathy  
3. Cervical myelopathy  
4. Cervical spondylosis  
Postoperative Diagnosis:  
Same as above.  
Procedure Performed:  
1. C4-7 anterior cervical discectomy   
and fusion  
2. C4-7 anterior cervical   
instrumentation  
3. Use of interbody biomechanical   
device x 3  
4. Use of allograft  
5. Use of microscopy  
6. Use of intraoperative   
neurophysiologic monitoring  
Anesthesia: GETA  
Medications: 2 grams of Ancef  
EBL: 50cc  
UOP: See anesthesia record  
Fluids: Crystalloid  
Drains: None  
Findings:  
1. Degenerative changes were   
identified at each of the levels   
operated on  
including disc degeneration, disc   
protrusion, osteophyte formation, as   
well as  
ligamentum flavum and uncovertebral   
joint hypertrophy leading to   
stenosis.  
Indications for Procedure:  
The patient is a 77 year old female   
who presents with a history of neck   
and  
radiating arm pain/numbness and   
tingling as well as progressive   
symptoms of  
cervical myelopathy. She had a recent   
cervical MRI which showed severe   
central  
stenosis at C6-7 in addition to   
severe neuroforaminal stenosis at   
C4-5, C5-6,  
and C6-7. She has undergone   
non-operative management for her   
condition;  
however, she remains symptomatic.   
Risks, benefits, and alternatives to   
the  
above procedure were discussed and   
documented in the medical record. The  
patient expressed understanding of   
these risks and asked that we proceed   
with  
surgery.  
Consent:  
I have discussed with Elmira Warner the benefits, risks, and   
alternatives of  
surgical treatment. I have explained   
that nonoperative management can be  
continued and is an option. The risks   
of surgery include, among others,  
bleeding, infection, blood   
vessel/major vascular injury or nerve   
injury, spinal  
cord injury, epidural hematoma,   
dysphagia, dysphonia, voice change,   
tracheal  
injury, esophageal injury, vocal cord   
injury, Toni's syndrome, Bell's   
palsy,  
hematoma formation causing   
respiratory compromise, spinal fluid   
leak, the need  
for further surgery, the failure to   
improve, hardware malposition or   
failure,  
non-union, and adjacent level   
disease. Spine surgery inherently has   
risks that  
could result in neurological injury   
including weakness, numbness, pain,   
or  
paralysis. Medical complications   
including heart attack, stroke,   
anesthetic  
complications, bowel or bladder   
dysfunction, pneumonia, ileus, blood   
clots  
involving the legs (DVT) or going to   
the lungs (PE), blindness, or death   
can  
occur following spinal surgery.   
Finally, the chances of developing   
transitional  
degeneration, to the extent of   
requiring further medical care, above   
or below  
the current instrumentation, ranges   
in approximately 3% - 4% per year of   
life,  
for each motion segment fused. We   
take steps to reduce the risks but   
they still  
occur. I also described in detail   
that improvement was not guaranteed.  
?  
The patient expressed understanding   
of these risks and asked that we   
proceed  
with surgery. I described the surgery   
in layman's terms, and gave ample  
opportunity for questions, which were   
answered to the best of my ability.  
Procedure:  
Patient was marked, examined, and   
consent was reviewed and signed in   
the  
pre-operative area. Patient was then   
brought to the operating room. After  
intubation patient was positioned   
supine on the operative table. All   
bony  
prominences were well padded. The   
patient was administered a dose of  
preoperative antibiotics within 30   
minutes of the incision and had SCDs   
for  
intraoperative DVT prophylaxis. Phipps   
was placed. Intraoperative  
neurophysiologic monitoring leads   
were placed. Baseline SSEP and MEP   
signals  
were obtained.  
The patient was prepped and draped in   
a sterile fashion. Surgical time out   
was  
performed. The incision was localized   
with fluoroscopy. A transverse left  
sided incision was made. Platysma was   
splint in line with the incision.   
Blunt  
dissection was used to mobilize the   
fascia and the platysma. Staying   
lateral to  
the strap muscles and medial to the   
SCM and vessels blunt dissection was  
performed using the natural tissue   
plains. The pre-vertebral fascia was   
then  
split bluntly with kittners and the   
ALL was identified. A spinal needle   
was  
used for localization along with   
lateral fluoroscopy. Secondary spine   
pause was  
performed. Spinal needle was removed   
and bovie was used to yasemin its place.  
The longus colli muscles were   
released bilaterally around the C4-7   
disc spaces.  
The anterior osteophyte was taking   
down off the C4-5 disc space.   
Retractors  
were inserted under the longus colli.   
Silver Plume pins were placed in C4 and C5   
for  
distraction. At this point, the   
surgical microscope was positioned   
over (more content not included)...     [facility]  
   
                                                    2022 Hospital   
Discharge instructions                    
  
  
Yasemin Vallejo PA-C - 2022  
  
  
Formatting of this note might be   
different from the original.  
Images from the original note were   
not included.  
  
DISCHARGE INSTRUCTIONS  
  
ANTERIOR CERVICAL DISCECTOMY FUSION  
  
Your surgeon has performed an   
operation on your cervical spine   
(neck) to relieve pressure on the   
spinal and/or nerves. This involved   
making an incision in the front of   
your neck an removing one or more of   
the discs that support your spine.   
Next, a small piece of bone, a   
titanium plate and screws were used   
to fuse two or more of the vertebrae   
(bones) together.  
  
The following are instructions to   
help in your recovery once you have   
been discharged from the hospital.   
Even if you feel well, it is   
important that you follow these   
guidelines. If you do not let your   
neck heal properly from the surgery,   
you can increase the chance of return   
of your symptoms and other   
complications.  
  
YOUR COLLAR:  
You will be given a cervical collar   
to wear postoperatively  
You must wear the collar at all times   
until seen in the office for your   
first post-operative visit.  
The collar may be removed for meals   
and hygiene.  
Limit bending, or motion at the neck   
while the brace is off.  
  
SLEEPING:  
You may sleep in any position that   
makes you comfortable as long as your   
collar is securely in place  
Many patients find comfort sleeping   
in a recliner chair  
It is normal to have difficulty   
sleeping for the first several weeks   
following your surgery.  
  
INCISION:  
Please make sure to check around your   
bandage at least twice daily for   
signs and symptoms of infection:  
ALWAYS wash your hands before   
touching your incision or the area   
surrounding it  
You do not have any sutures that need   
to be removed.  
You may remove the outer dressing at   
7 days post-operatively  
There will be surgical glue over the   
incision- DO NOT pick or remove this   
glue, it will fall of naturally.  
DO NOT apply anything over your   
incision including lotions, creams,   
Neosporin, or scar creams.  
  
SHOWERING:  
You may shower as normal with your   
outer dressing still on. Once you   
have removed your outer dressing you   
may continue to shower as normal as   
well (see above).  
Hair washing is permissible while in   
the shower.  
No tub baths, hot tubs or whirlpools   
for 6 weeks.  
Keep Incision dry as possible- you   
may wash your incision daily with   
gentle soap and water.  
Do not scrub your incision, rub or   
scratching her incision.  
Use a soft cloth and lightly dab/pat   
dry your incision  
You may reapply a clean dry dressing   
to the incision daily after showering   
or leave open to air.  
  
EXERCISE/ ACTIVITY RESTRICTIONS:  
You have unlimited walking and stair   
climbing privileges. Walking outside   
(in nice weather and on stable   
ground) or walking on a treadmill (no   
incline) is also allowed  
Please get up and walk at least 4   
times daily  
This will help prevent post-operative   
blood clots and promote circulation  
We recommend that you avoid strenuous   
exercise.  
Do not jog, run, bicycle or do any   
other strenuous exercise unless your   
doctor says that it is ok  
Avoid lifting or reaching above your   
head.  
Where possible, avoid household   
activities that involve lifting,   
reaching, pushing or pulling, such as   
laundry, vacuuming and childcare  
Do NOT lift anything weighing greater   
than 10-15lbs.  
Talk to your doctor before resuming   
sexual activity  
  
PAIN:  
Take pain medication as prescribed.   
As your pain level decreases, you may   
begin to take over-the-counter Extra   
Strength Tylenol. The maximum Tylenol   
you are able to take is 3000 mg a day   
total. PLEASE AVOID ANTI-INFLAMMATORY   
MEDICATIONS (NSAIDS; ie: Mobic,   
ibuprofen, aleve, motrin, diclofenac)   
UNTIL YOUR DOCTOR OR NP/PA ADVISE   
OTHERWISE (TYPICALLY 3 MONTHS AFTER   
SURGERY)  
You should try to wean from your pain   
medicine in 2-3 weeks.  
It is not recommended to drive or   
operate heavy machinery while taking   
narcotic pain medication.  
You will be provided with a   
prescription for pain medication when   
you are discharged from the hospital  
Moving forward, you are to contact   
the office at [phone] or via   
[place], for additional refills  
You can receive up to 2 additional   
refills after discharge  
Please contact our office 2-3 days   
before you run out of your   
medication, that way we can refill   
your prescription in a timely manner  
Refills will NOT be given over the   
weekend or after office hours   
(8:30am-4:00pm).  
Narcotic pain medicine can cause   
constipation. You can obtain the   
following medication to prevent   
constipation.  
Colace twice daily, may purchase over   
the counter. (May substitute with   
similar med such as Senna.)  
Miralax 1-2 times daily. Over the   
counter. Twice daily if you are   
feeling constipated.  
Magnesium Citrate. OTC. If no bowel   
movement in a few days and you are   
becoming uncomfortable.  
Enema. Over the counter. If no relief   
with above regiment.  
You may also be given a prescription   
for a steroid and a muscle relaxer to   
help with pain.  
  
DRIVING:  
You may NOT drive a car until told   
otherwise by your physician (usually   
at your first office visit)  
You may be a passenger for short   
distances (20-30 minutes).  
If you must take a longer trip, make   
sure to make several pit stops so   
that you can walk around and stretch   
your legs. Reclining the passenger   
seat seems to be the most comfortable   
position for most patients.  
  
FOLLOW-UP APPOINTMENTS:  
YOU WILL BE SCHEDULED FOR A 1 WEEK   
POST SURGERY, VIRTUAL VISIT/TELEPHONE   
CALL WITH ONE OF OUR ADVANCED   
PRACTICE PROVIDERS (NURSE   
PRACTITIONER OR PHYSICIAN ASSISTANT).   
THIS IS A TELEPHONE CALL ONLY. YOU DO   
NOT HAVE TO COME TO THE OFFICE FOR   
THIS VISIT. IF [facility], IT WILL SHOW UP AS AN   
APPOINTMENT, BUT AGAIN, THIS IS A   
TELEPHONE CALL ONLY.  
You will be given a post op   
appointment for 2-3 weeks after your   
surgery date- you may see either your   
surgeon or one of our Advanced   
Practice Providers.  
  
QUESTIONS, CONCERNS and WHEN TO CALL   
US:  
You may experience pain in your neck   
and/or pain between your shoulder   
blades. This is NORMAL and should   
improve in the next few weeks with   
the help of pain medication, muscle   
relaxers and rest. Some patients   
report that a warm compress on the   
back of the neck or between the   
shoulder blades helps  
Should you experience any of the   
following: CONTACT US IMMEDIATELY:  
New numbness or weakness  
Pain that is progressively getting   
worse and is not relieved by your   
pain medication, muscle relaxers,   
rest and warm compresses  
Bleeding, redness, swelling, pain,   
drainage from your surgical incision   
or if your incision is opening  
Chills or flu-like symptoms  
Fever greater than 101.0 F  
Inability to eat, drink fluids or   
take medications  
Problems with bowel or bladder   
functions  
Difficulty breathing or shortness of   
breath  
Warmth, tenderness or swelling in   
your calf  
  
CONTACT/OFFICE INFORMATION:  
If you have any additional   
questions/concerns, please contact   
the office at [phone]  
[facility] message  
For life-threathening emergency,   
please call 911  
  
  
documented in this encounter            [facility]  
Work Phone:   
[phone]  
   
                                                    2022 History of   
Present illness Narrative                 
  
  
Formatting of this note might be   
different from the original.  
  
  
  
Electronically signed by Dov Crowe MD at 2022 8:31 AM   
EDTdocumented in this encounter         [facility]  
Work Phone:   
[phone]  
   
                                                    2022 Miscellaneous   
Notes                                     
  
  
Formatting of this note might be   
different from the original.  
Pt notified and verbalized   
understanding.  
  
Latoya Mathew Ma  
  
  
Electronically signed by Latoya Mathew Ma at 2022 7:38 PM EDT  
  
  
Formatting of this note might be   
different from the original.  
1. Pickled eggs and beets should not   
falsely lower hemoglobin. If   
anything, beets can stimulate   
production of red blood cells and   
could increase hemoglobin.  
  
2. Since she got blood work done at   
pre-op appointment, [facility] will   
determine by that blood work if any   
further action is needed prior to   
surgery. I would still like to get   
repeat CBC and iron studies but this   
does not need to be prior to surgery   
if not needed for [facility].  
  
3. Continue with prune juice and   
daily miralax. We just started   
miralax last week, she needs to take   
miralax routinely to see the true   
benefit.  
  
UMER [place].BLANE  
  
  
  
Electronically signed by Yanet Sanchez APRN.CNP at 2022 7:29   
PM EDT  
  
  
Formatting of this note might be   
different from the original.  
ADDED: Pt called back and what you   
wanted her to do below cleaned her   
out. She is concerned what to do   
daily to stay cleaned out. Prune and   
prune juice and miralax does not   
work.  
  
Please advise.  
  
Lupe Kowalski LPN  
  
  
  
Electronically signed by Lupe Kowalski LPN at 2022 8:39 AM EDT  
  
  
Formatting of this note might be   
different from the original.  
Spoke with pt and information listed   
below given. Pt verbalizes   
understanding.  
No blood being seen.  
  
Pt has 2 questions:  
  
1. She was eating pickled eggs 1   
daily with 2 slices of red beets.   
Could this be some of her problem   
with hemoglobin being low?  
  
2. Since she is not seeing any blood   
does she need to repeat CBC before   
surgery on Monday? She wanted to let   
you know she had CBC and a lot of   
blood work done yesterday at [facility] at   
her PAT apt.  
  
Please advise pt.  
  
Lupe Kowalski LPN  
  
  
  
Electronically signed by Lupe Kowalski LPN at 2022 8:52 AM EDT  
  
  
Formatting of this note might be   
different from the original.  
VM left with patient to contact   
office for results  
  
Latoya Mathew Ma  
  
  
  
Electronically signed by Latoya Mathew Ma at 2022 10:36 AM EDT  
  
  
Formatting of this note might be   
different from the original.  
Please tell patient to continue with   
daily miralax and allow it some time   
to work. I recommend also drinking   
half a bottle of OTC Mag Citrate, if   
no relief in 6 hours then finish the   
bottle of Mag Citrate.  
  
Also, please let patient know that   
blood work results have came back.   
Overall, everything looks pretty   
good. Hemoglobin is low at 9.9 --   
which is much lower than her normal   
baseline. Please ask if she has been   
bleeding at all -- any blood in urine   
or stool, or dark tarry stools? I   
would like to repeat a CBC to recheck   
this in 1-2 weeks. We will also check   
iron studies with this blood work.  
  
UMER [place].BLANE  
  
  
  
Electronically signed by Yanet Sanchez APRN.CNP at 2022 9:01   
AM EDT  
  
  
Formatting of this note might be   
different from the original.  
Patient calling in and reports she   
was seen recently by Yanet Sanchez   
on 4/15/22 and discussed her issues   
with constipation. Patient reports   
Miralax was ordered for her and she   
has been taking it without any good   
results. She reports she has had one   
small bowel movement this morning and   
that is all. She reports she has been   
constipated for about a week. She   
states she continues to eat prunes,   
drink prune juice and drink fluids   
with no effect. She states she has a   
constant pressure type feeling to her   
left abdominal area and rates it 2   
out of 10 on pain scale. She is   
passing little gas. Denies nausea but   
states she feels a fullness to the   
top of her stomach and does not feel   
like eating or drinking much. Denies   
abdominal distention.  
  
Please advise patient. Thank you.  
  
  
Electronically signed by Janeth Costello RN at 2022 8:25 AM   
EDTdocumented in this encounter         [facility]  
   
                                                    2022 Hospital   
Discharge instructions                    
  
  
Zuleika Andrea RN - 2022  
  
  
Formatting of this note is different   
from the original.  
  
  
Please bring your [facility]   
Surgical Information folder on the   
day of surgery.  
  
Please yasemin the last dose taken (date   
and time ) on your Daily Medications   
List provided in your After Visit   
Summary.  
  
Please bring a photo ID and insurance   
information  
  
TAKE the following medications the   
morning of your surgery Baby Aspirin.  
  
You may take your prescription pain   
medications. You may take Tylenol   
(Acetaminophen) if needed for pain.  
  
No Motrin, Ibuprofen, or Advil 24   
hours prior to surgery, or longer if   
instructed by your surgeon.  
  
No Aleve or Naprosyn or Mobic   
(meloxicam) 3 days before surgery.  
  
If you are on BLOOD THINNERS or   
ASPIRIN, continue unless notified by   
physician or Physicians Assistant to   
do otherwise.  
  
You will receive a reminder call the   
day before surgery with your Same Day   
Surgery arrival time.  
  
If you have specific questions,   
please call your surgeon.  
  
CHG shower kit and instructions given   
to patient. Please remember to use   
half the bottle the night before   
surgery and half the bottle the day   
of surgery. Clean sheets and clothes   
should be used after each use.  
  
Please remember that you must have   
someone available to take you home   
the day that you are discharged. The   
nursing staff on the floor will work   
with you and your surgeon to ensure   
you are reaching appropriate   
discharge milestones. The typical   
length of stay for your procedure is   
One day. This means that you should   
expect to be discharged    
during the day. Your ride home must   
be at the hospital no later than 9am   
on this day. If you are unable to   
secure a ride for this date and time   
prior to your surgery please let us   
know.  
  
You may use the free  parking at   
the main entrance on [address] [place] [address], or the free parking in the   
[facility] parking deck  
  
  
  
  
  
The following attachments cannot be   
sent through Care Everywhere.Cervical   
Spinal Fusion: Post-op   
(English)documented in this encounter   [employer] Phone:   
[phone]  
   
                                                    2022 History of   
Present illness Narrative                 
  
  
Formatting of this note might be   
different from the original.  
Surgical services was notified of   
latex allergy  
  
  
Electronically signed by Zuleika Andrea RN at 2022 4:41 PM   
EDTdocumented in this encounter         [facility]  
Work Phone:   
[phone]  
   
                                                    04- History of   
Present illness Narrative                 
  
  
Formatting of this note is different   
from the original.  
Chief Complaint  
Patient presents with:  
Pre-Op Exam  
  
HPI  
Elmira Warner is a 77 year old   
female who presents here today for   
Above Complaints..  
  
Elmira is an established patient of   
Dr. Rodriguez. Elmira is a new patient   
to me today.  
  
Concerns today...  
  
Pre-op evaluation:  
Plan for C4-7 ACDF surgery through   
[facility]   
medicine scheduled for the morning of   
22. Has not had pre-op blood   
work done yet. No problems with   
anesthesia in the past.  
  
Last EKG on 3/14/22 -- NSR. Rate of   
96. Normal EKG.  
  
Stress test on 22:  
CONCLUSIONS:  
1. SPECT Perfusion Study: Normal.  
2. There is no scintigraphic evidence   
for inducible ischemia.  
3. No evidence of scarred myocardium.   
There is a moderate sized apparent  
perfusion defect of the inferior mid   
and basilar segments that contracts  
normally favoring artifact.  
4. Left ventricle is normal in size.   
The left ventricle systolic  
function is normal.  
5. Right ventricle is normal in size.   
The right ventricle systolic  
function is normal.  
6. This is a low risk scan.  
  
Constipation:  
Mild x 2 weeks. Having hard round   
stools. Has been trying to eat prunes   
and prune juice -- which is helping.   
Had small bowel movement this morning   
but very hard. Last BM before that   
was Tuesday morning. Has changed to   
generic Crestor which patient thinks   
may be having an affect on bowels.  
  
Past medical history, appointments,   
medications, allergies reviewed.  
  
Previous Medical History  
PAST MEDICAL HISTORY  
Diagnosis Date  
Abdominal pain, right lower quadrant  
Abdominal pain, right upper quadrant  
Cerebrovascular disease, unspecified  
Coronary atherosclerosis of   
unspecified type of vessel, native or   
graft  
Diverticulosis of colon (without   
mention of hemorrhage)  
Essential hypertension, benign  
Intrinsic asthma with status   
asthmaticus  
Mixed hyperlipidemia  
Osteoporosis, unspecified  
Thoracic or lumbosacral neuritis or   
radiculitis, unspecified  
  
Previous Surgical History  
PAST SURGICAL HISTORY  
Procedure Laterality Date  
ANGIOPLASTY times 3  
7 stents  
APPENDECTOMY  
COLONOSCOPY FLX DX W/COLLJ SPEC WHEN   
PFRMD 12 years ago  
Colonoscopy  
COLONOSCOPY FLX DX W/COLLJ SPEC WHEN   
PFRMD 2012  
Colonoscopy  
HYSTERECTOMY HX  
  
Family History  
FAMILY HISTORY  
Problem Relation Age of Onset  
Stroke Father 52  
heart dx,osteoarthritis  
other (rheumatic fever) Sister  
other (rheumatic fever) Sister  
other (heart disease) Sister  
other (heart disease) Brother  
  
Patient Allergies  
ALLERGIES  
Allergen Reactions  
Adhesive Tape-Silic* Rash  
Demerol [Meperidine* Vomiting  
migraine  
Influenza Vaccine T* Diarrhea  
vomiting/fever  
Iodine Other: See Comments  
sick /headaches  
Penicillins Hives  
throat and mouth swelling  
Shellfish Hives  
  
Current Medications  
Current Outpatient Medications on   
File Prior to Visit  
Medication Sig  
albuterol (PROVENTIL) 2.5 mg /3 mL   
(0.083 %) nebulizer solution Use 3 mL   
via nebulizer every 6 hours as   
needed.  
rosuvastatin (CRESTOR) 5 mg tablet   
Take 1 tablet by mouth daily at   
bedtime.  
meloxicam (MOBIC) 15 mg tablet Take 1   
tablet by mouth once daily. Take with   
food.  
vit B complex no.12/niacin,B3,   
(VITAMIN B COMPLEX NO.12-NIACIN ORAL)   
Take by mouth.  
TUMERIC-GING-OLIVE-OREG-CAPRYL ORAL   
Take by mouth.  
zinc sulfate (ZINC-15 ORAL) Take by   
mouth.  
lisinopril (ZESTRIL, PRINIVIL) 5 mg   
tablet Take 0.5 tablets by mouth once   
daily.  
Bisacodyl (DULCOLAX) 5 mg tab Use as   
directed for Miralax / Gatorade Bowel   
Prep Kit  
levalbuterol tartrate HFA (XOPENEX   
HFA) 45 mcg/actuation inhaler Inhale   
2 Puffs as instructed every 6 hours   
as needed.  
Nebulizers ([email or web address].com NEBULIZER) 1   
Each as directed. Portable Nebulizer   
with tubing, Dx: mild persistent   
asthma  
ubidecarenone Q-10 (CO Q-10) 10 mg   
cap Take 10 mg by mouth once daily.  
magnesium oxide (MAG-OX) 400 mg   
(241.3 mg magnesium) tablet Take 400   
mg by mouth once daily.  
Lactobac no.41/Bifidobact no.7   
(PROBIOTIC-10 ORAL) Take by mouth.  
nitroglycerin sublingual (NITROSTAT)   
0.4 mg SL tablet Dissolve 1 tablet   
under the tongue every 5 minutes as   
needed for Chest Pain. Max of 3   
doses. If no relief, call 911.  
potassium chloride (K-TAB) 10 mEq   
tablet Take 1 tablet by mouth daily   
with breakfast.  
MULTIVITAMIN (VITAMIN A DAY ORAL)   
Take by mouth.  
Cholecalciferol, Vitamin D3, (VITAMIN   
D) 1,000 unit Cap Take 1,000 Units by   
mouth once daily.  
Ascorbic Acid (VITAMIN C) 1,000 mg   
tablet Take 1,000 mg by mouth once   
daily.  
Aspirin 81 mg Tab Take 81 mg by mouth   
once daily.  
  
GLUC COLBERT/CHONDRO COLBERT A/VIT C/MN   
(GLUCOSAMINE 1500 COMPLEX ORAL) Take   
by mouth.  
  
CALCIUM CARBONATE/VITAMIN D3 (CALCIUM   
+ D ORAL) Take by mouth.  
polyethylene glycol 3350 (MIRALAX,   
GLYCOLAX) 17 gram/dose powder Use as   
directed for Miralax / Gatorade Bowel   
Prep Kit  
Gatorade Sports Drink Use as directed   
for Miralax / Gatorade Bowel Prep Kit  
Potassium 99 mg tab Take 1 tablet by   
mouth once daily. (Patient not   
taking: Reported on 3/14/2022  
)  
clopidogrel (PLAVIX) 75 mg tablet   
Take 75 mg by mouth once daily.  
multivitamin tablet Take 1 tablet by   
mouth once daily.  
  
No current facility-administered   
medications on file prior to visit.  
  
Social History  
Social History  
  
Tobacco Use  
Smoking status: Never Smoker  
Smokeless tobacco: Never Used  
Vaping Use  
Vaping Use: Never used  
Substance Use Topics  
Alcohol use: Never  
Drug use: No  
  
REVIEW OF SYSTEMS: as above  
  
Reviewed relevant PMHx, PSHx, Social   
Hx, current medications and   
allergies.  
  
Review of Symptoms  
See HPI. All other systems are   
negative.  
  
EXAM:  
/62 (BP Site: Left Arm, BP   
Position: Sitting, BP Cuff Size:   
Regular Adult)   Pulse 68   Resp 16     
Wt 74.8 kg (164 lb 12.8 oz)   BMI   
29.19 kg/m  
  
General Appearance: Well appearing,   
alert, in no acute distress,   
well-hydrated, well nourished..  
Skin: Skin color, texture, turgor   
normal, no suspicious rashes or   
lesions.  
Neck: Supple, no adenopathy; thyroid   
symmetric, normal size, no bruits.  
Back:no pain to palpation of   
vertebrae, good flexion and   
extension, good range of motion, no   
muscle tenderness, reflexes are 2+   
and symmetric, motor and sensory   
appear to be normal, negative SLR   
test, no evidence of scoliosis  
Lungs: Lungs clear to auscultation.   
No wheezing, rhonchi, rales..  
Heart: RRR without murmur, gallop, or   
rubs. No ectopy.  
Extremities: No deformities, edema,   
skin discoloration, clubbing or   
cyanosis. Good capillary refill. .  
Musculoskeletal: No joint swelling,   
deformity, or tenderness.  
Peripheral Pulses: Normal.  
Neurologic: Gait normal. Reflexes   
normal and symmetric. Sensation   
grossly intact..  
  
Health Maintenance List  
COVID-19 VACCINE(1) Never done  
SPIROMETRY Never done  
HEPATITIS C SCREENING Never done  
BP CONTROLLED (<130/80) Never done  
SHINGRIX VACCINE(1 of 2) Never done  
PNEUMOVAX AGE 65 AND OVER WITH 5YR   
LOOKBACK(1) Never done  
ADVANCE DIRECTIVE DISCUSSION Never   
done  
DTAP,TDAP,TD(1 - Tdap) due on   
04/15/2023  
INFLUENZA(Season Ended) due on   
2022  
LDL CHOLESTEROL due on 2022  
ANNUAL PCP TEAM CHRONIC DISEASE VISIT   
due on 2023  
DEPRESSION SCREENING due on   
04/15/2023  
DIABETES SCREEN due on 2024  
BONE DENSITY Completed  
MENINGOCOCCAL CONJUGATE Aged Out  
  
ASSESSMENT/PLAN:  
1. Pre-operative clearance - ICD9:   
V72.84, ICD10: Z01.818 (primary   
diagnosis)  
Blood work pending. EKG stable and   
normal from 1 month ago. Stress test   
normal from this week. Clearance   
pending blood work results. No   
concerns currently.  
- CBC  
- COMP METABOLIC PANEL  
  
2. Chronic constipation - ICD9:   
564.00, ICD10: K59.09  
Miralax in full glass of water as   
needed.  
- POLYETHYLENE GLYCOL 3350 17   
GRAM/DOSE ORAL POWDER  
  
3. Hyperlipidemia, mixed - ICD9:   
272.2, ICD10: E78.2  
- good control  
- Continue current medication.  
  
4. Essential hypertension - ICD9:   
401.9, ICD10: I10  
- good control  
- Continue current medication(s)  
- Encouraged dietary sodium   
restriction/DASH diet  
- Recommended regular aerobic   
exercise.  
- Recommend home blood pressure   
monitoring, to bring results in on   
next visit  
- Goal of BP <130/80  
- Recommended no refined sugar, low   
refined starch, healthy oil intake   
(olive oil), healthy protein (fish)   
along the lines of the Mediterranean   
diet.  
  
RTO as needed.  
  
Prescription instructions reviewed   
with patient as applicable. Potential   
red flag symptoms discussed with the   
patient. Reviewed appropriate action   
plan to take if red flag symptoms   
occur. Patient agreeable to treatment   
plan.  
  
Yanet Sanchez APRN.CNP  
[address] [place] [address]  
Phone: [phone]  
Fax: [phone]  
  
  
Electronically signed by Yanet Sanchez APRN.CNP at 04/15/2022 1:57   
PM EDTdocumented in this encounter      [facility]  
   
                                        2022 Note     HNO ID: [id number]  
Author: RT Frankie(R)  
Service: Nuclear Medicine  
Author Type: Technologist  
Type: Progress Notes  
Filed: 2022 8:25 AM  
Note Text:  
RADIOLOGY SERVICE PROGRESS NOTE  
SERVICE DATE: 2022  
SERVICE TIME: 8:24 AM  
PATIENT IDENTITY VERIFICATION   
COMPLETED USING TWO (2) STANDARD  
IDENTIFIERS: Name and Date of Birth   
confirmed by patient verbally  
FALL SCREENING: Has the patient had 2   
falls in the last year or 1 fall  
with injury or currently using an   
Ambulatory Assistive Device (Walker,  
Cane, Wheelchair, Crutches, etc.)? No  
PATIENT GENDER DATA: .female  
Pregnant: No  
ALLERGIES: Reviewed and updated  
MEDICATIONS REVIEWED: No  
PATIENT RELEVANT IMPLANT DATA   
REVIEWED: Not Applicable  
CREATININE:  
Creatinine  
Date Value Ref Range Status  
2021 1.03 (H) 0.58 - 0.96 mg/dL   
Final  
2020 1.11 (H) 0.58 - 0.96 mg/dL   
Final  
eGFR-All Other Races  
Date Value Ref Range Status  
2021 52 . Final  
Comment:  
eGFR (Estimated GFR) Units of   
measure: mL/min/1.73 meters squared  
eGFR is derived from the reexpressed   
MDRD Study equation using the  
following  
parameters: serum creatinine, age,   
gender and race. The creatinine assay  
has  
been calibrated to be traceable to   
IDMS.  
An eGFR <60 mL/min/1.73m2 for >3   
months is consistent with chronic   
kidney  
disease. Refer to KDOQI guidelines   
for clinical interpretation.  
In patients with unstable renal   
function, e.g. those with acute   
kidney  
injury,  
the eGFR may not accurately reflect   
actual GFR.  
eGFR-  
Date Value Ref Range Status  
2021 >60 Final  
P.O.C.T. RESULTS: N/A 2022  
DIAGNOSTIC CT PERFORMED: No  
IV SITE: Inpatient - refer to [facility]   
documentation  
POST EXAM PIV STATUS: Left in for   
next appointment  
PROCEDURE TYPE: NM Stress: 12.3mCi   
Tc99m-Myoview was administered IV for  
Rest Imaging at 650 by ms. 31.5 mCi   
Tc99m-Myoview was administered IV for  
Stress Imaging at 815 by ty.  
ADMINISTRATION TIME: 650  
PATIENT DISCHARGED TO: Patient taken   
to IP transport area for return to  
RNF/ICU/ED.  
A Diagnostic radioactive procedure   
has taken place, with no further  
precautions necessary other than   
routine body substance precautions.   
More  
information regarding radiation   
safety can be found using this link:  
[email or web address]/[email or web address]  
[email or web address]/files/Rad%20Protectio  
n  
%20-%20Diagnostic%20Nuclear%20Medicin  
e%20Procedures.pdf  
SIGNATURE: RT Franike(R) PATIENT   
NAME: Elmira Warner  
DATE: 2022 MRN: [id number]  
TIME: 8:24 AM PAGER/CONTACT #:          [facility]  
   
                                                    2022 History of   
Present illness Narrative                 
  
  
Formatting of this note is different   
from the original.  
RADIOLOGY SERVICE PROGRESS NOTE  
  
SERVICE DATE: 2022  
SERVICE TIME: 8:24 AM  
  
PATIENT IDENTITY VERIFICATION   
COMPLETED USING TWO (2) STANDARD   
IDENTIFIERS: Name and Date of Birth   
confirmed by patient verbally  
FALL SCREENING: Has the patient had 2   
falls in the last year or 1 fall with   
injury or currently using an   
Ambulatory Assistive Device (Walker,   
Cane, Wheelchair, Crutches, etc.)? No  
PATIENT GENDER DATA: .female  
Pregnant: No  
ALLERGIES: Reviewed and updated  
MEDICATIONS REVIEWED: No  
PATIENT RELEVANT IMPLANT DATA   
REVIEWED: Not Applicable  
CREATININE:  
Creatinine  
Date Value Ref Range Status  
2021 1.03 (H) 0.58 - 0.96 mg/dL   
Final  
2020 1.11 (H) 0.58 - 0.96 mg/dL   
Final  
  
eGFR-All Other Races  
Date Value Ref Range Status  
2021 52 . Final  
Comment:  
eGFR (Estimated GFR) Units of   
measure: mL/min/1.73 meters squared  
eGFR is derived from the reexpressed   
MDRD Study equation using the   
following  
parameters: serum creatinine, age,   
gender and race. The creatinine assay   
has  
been calibrated to be traceable to   
IDMS.  
An eGFR <60 mL/min/1.73m2 for >3   
months is consistent with chronic   
kidney  
disease. Refer to KDOQI guidelines   
for clinical interpretation.  
In patients with unstable renal   
function, e.g. those with acute   
kidney injury,  
the eGFR may not accurately reflect   
actual GFR.  
  
  
eGFR-  
Date Value Ref Range Status  
2021 >60 Final  
  
P.O.C.T. RESULTS: N/A 2022  
  
DIAGNOSTIC CT PERFORMED: No  
  
IV SITE: Inpatient - refer to [facility]   
documentation  
POST EXAM PIV STATUS: Left in for   
next appointment  
  
PROCEDURE TYPE: NM Stress: 12.3mCi   
Tc99m-Myoview was administered IV for   
Rest Imaging at 650 by ms. 31.5 mCi   
Tc99m-Myoview was administered IV for   
Stress Imaging at 815 by ty.  
ADMINISTRATION TIME: 650  
PATIENT DISCHARGED TO: Patient taken   
to IP transport area for return to   
RNF/ICU/ED.  
  
A Diagnostic radioactive procedure   
has taken place, with no further   
precautions necessary other than   
routine body substance precautions.   
More information regarding radiation   
safety can be found using this link:   
[email or web address]/environm  
[email or web address]/files/Rad%20Protectio  
n%20-%20Diagnostic%20Nuclear%20Medici  
ne%20Procedures.pdf  
  
SIGNATURE: RT Frankie(R) PATIENT   
NAME: Elmira Warner  
DATE: 2022 MRN: [id number]  
TIME: 8:24 AM PAGER/CONTACT #:  
  
  
  
Electronically signed by ANNE-MARIE David) at 2022 8:25 AM   
EDTdocumented in this encounter         [facility]  
   
                                                    2022 Miscellaneous   
Notes                                     
  
  
Formatting of this note might be   
different from the original.  
Please call patient and notify them   
stress testing was without inducible   
ischemia. Thank you!  
  
  
Electronically signed by Jolene Liang APRN.CNP at 2022 10:38   
AM EDTdocumented in this encounter      [facility]  
   
                                                    2022 Miscellaneous   
Notes                                     
  
  
Formatting of this note is different   
from the original.  
  
PDMP website checked and validated.   
All prescriptions have been   
APPROPRIATELY filled. No suspicious   
activity was identified. 3/31/2022 by   
Yanet Sanchez APRN.CNP  
  
The following approved medication   
requests have been transmitted   
electronically.  
  
Pending Prescriptions Disp Refills  
TRAMADOL 50 MG TABLET 20 tablet 0  
Sig: Take 1 tablet by mouth every 6   
hours as needed for pain for up to 5   
days.  
AGUSTIN Class: C-IV  
MARQUISE: No  
  
Yanet Sanchez APRN.CNP  
  
  
  
Electronically signed by Yanet Sanchez APRN.CNP at 2022 4:12   
PM EDT  
  
  
Formatting of this note is different   
from the original.  
Patient has been identified by name   
and date of birth: Yes  
Patient phones for refill(s):  
Pending Prescriptions Disp Refills  
TRAMADOL 50 MG TABLET 20 tablet 0  
Sig: Take 1 tablet by mouth every 6   
hours as needed for pain for up to 5   
days.  
AGUSTIN Class: C-IV  
MARQUISE: No  
  
  
Date of last office visit in primary   
care: 2022, no future appt   
scheduled  
Last 2 Encounter Wt Readings:  
Date: Wt:  
2022 75.3 kg (166 lb)  
2022 78 kg (172 lb)  
Previous labs/tests for medication:   
Not applicable  
Please advise. Thank you. Sepideh Parr LPN  
  
patient said she has half pill left   
she cuts them in half. Aware PCP is   
out of office on Thursday.  
  
  
Electronically signed by Sepideh Parr LPN at 2022 3:12 PM   
EDTdocumented in this encounter         [facility]  
  
  
  
                                                    Evaluation note   
  
  
  
                                                    Diagnosis  
   
                                                      
  
  
Spondylolisthesis of cervical region  
  
  
Acquired spondylolisthesis  
   
                                                      
  
  
Cervical radiculopathy  
  
  
Brachial neuritis or radiculitis nos  
  
documented in this encounter  
[facility]  
Work Phone: [phone] note*   
  
                                                    Diagnosis  
   
                                                      
  
  
Neck pain, chronic  
  
  
Cervicalgia  
  
documented in this encounter  
[facility]   
  
                                                    Diagnosis  
   
                                                      
  
  
Coronary artery disease involving native heart without angina pectoris, 
unspecified   
vessel or lesion type  
   
                                                      
  
  
Hyperlipidemia, mixed  
  
  
Mixed hyperlipidemia  
   
                                                      
  
  
Encounter for other preprocedural examination  
   
                                                      
  
  
Coronary angioplasty status  
  
  
Postsurgical percutaneous transluminal coronary angioplasty status  
  
documented in this encounter  
[facility]   
  
                                                    Diagnosis  
   
                                                      
  
  
Pre-operative clearance- Primary  
  
  
Preoperative examination, unspecified  
   
                                                      
  
  
Chronic constipation  
  
  
Unspecified constipation  
   
                                                      
  
  
Hyperlipidemia, mixed  
  
  
Mixed hyperlipidemia  
   
                                                      
  
  
Essential hypertension  
  
  
Unspecified essential hypertension  
  
documented in this encounter  
[facility]   
  
                                                    Diagnosis  
   
                                                      
  
  
Lumbar radiculopathy  
  
  
Thoracic or lumbosacral neuritis or radiculitis, unspecified  
  
documented in this encounter  
[facility]  
Work Phone: [phone] note*   
  
                                                    Diagnosis  
   
                                                      
  
  
Low hemoglobin- Primary  
  
  
Anemia, unspecified  
  
documented in this encounter  
[facility]   
  
                                                    Diagnosis  
   
                                                      
  
  
Status post spinal surgery- Primary  
  
  
Other postprocedural status  
   
                                                      
  
  
Cervical myelopathy (HCC)  
  
  
Cervical spondylosis with myelopathy  
  
documented in this encounter  
[facility]  
Work Phone: [phone] note*   
  
                                                    Diagnosis  
   
                                                      
  
  
Status post spinal surgery- Primary  
  
  
Other postprocedural status  
   
                                                      
  
  
Cervical myelopathy (HCC)  
  
  
Cervical spondylosis with myelopathy  
  
documented in this encounter  
[facility]  
Work Phone: [phone] note*   
  
                                                    Diagnosis  
   
                                                      
  
  
Coronary artery disease involving native coronary artery of native heart without
   
angina pectoris- Primary  
   
                                                      
  
  
Essential hypertension  
  
  
Unspecified essential hypertension  
   
                                                      
  
  
Hyperlipidemia, mixed  
  
  
Mixed hyperlipidemia  
   
                                                      
  
  
Palpitations  
  
documented in this encounter  
[facility]   
  
                                                    Diagnosis  
   
                                                      
  
  
Injury of right shoulder, subsequent encounter- Primary  
   
                                                      
  
  
Fall, subsequent encounter  
   
                                                      
  
  
Neck pain, chronic  
  
  
Cervicalgia  
   
                                                      
  
  
Bilateral leg pain  
  
  
Pain in limb  
  
documented in this encounter  
[facility]   
  
                                                    Diagnosis  
   
                                                      
  
  
Injury of right shoulder, subsequent encounter  
   
                                                      
  
  
Fall, subsequent encounter  
   
                                                      
  
  
Neck pain, chronic  
  
  
Cervicalgia  
  
documented in this encounter  
[facility] note*   
  
                                                    Diagnosis  
   
                                                      
  
  
Right shoulder injury, subsequent encounter- Primary  
  
documented in this encounter  
[facility] note*   
  
                                                    Diagnosis  
   
                                                      
  
  
Screening for colon cancer- Primary  
  
  
Special screening for malignant neoplasms, colon  
  
documented in this encounter  
[facility] note*   
  
                                                    Diagnosis  
   
                                                      
  
  
Bilateral temporomandibular joint pain- Primary  
   
                                                      
  
  
Chronic eczematous otitis externa of both ears  
   
                                                      
  
  
Hoarse  
  
  
Dysphonia  
   
                                                      
  
  
Gastroesophageal reflux disease, unspecified whether esophagitis present  
  
documented in this encounter  
[facility] note*   
  
                                                    Diagnosis  
   
                                                      
  
  
Essential hypertension- Primary  
  
  
Unspecified essential hypertension  
   
                                                      
  
  
Mixed hyperlipidemia  
   
                                                      
  
  
Coronary artery disease involving native coronary artery of native heart without
   
angina pectoris  
   
                                                      
  
  
Palpitations  
  
documented in this encounter  
[facility] note*   
  
                                                    Diagnosis  
   
                                                      
  
  
Right shoulder injury, subsequent encounter- Primary  
  
documented in this encounter  
[facility] note*   
  
                                                    Diagnosis  
   
                                                      
  
  
Right shoulder injury, subsequent encounter- Primary  
  
documented in this encounter  
[facility] note*   
  
                                                    Diagnosis  
   
                                                      
  
  
Essential hypertension- Primary  
  
  
Unspecified essential hypertension  
  
documented in this encounter  
[facility] note*   
  
                                                    Diagnosis  
   
                                                      
  
  
Hyperlipidemia, mixed- Primary  
  
  
Mixed hyperlipidemia  
   
                                                      
  
  
Coronary artery disease involving native coronary artery of native heart without
   
angina pectoris  
   
                                                      
  
  
Essential hypertension  
  
  
Unspecified essential hypertension  
   
                                                      
  
  
Palpitations  
   
                                                      
  
  
Obesity, Class I, BMI 30-34.9  
  
  
Obesity, unspecified  
  
documented in this encounter  
[facility] note*   
  
                                                    Diagnosis  
   
                                                      
  
  
Injury of right shoulder, subsequent encounter- Primary  
   
                                                      
  
  
Fall, subsequent encounter  
   
                                                      
  
  
Neck pain, chronic  
  
  
Cervicalgia  
  
documented in this encounter  
[facility] note*   
  
                                                    Diagnosis  
   
                                                      
  
  
Injury of right shoulder, subsequent encounter  
   
                                                      
  
  
Fall, subsequent encounter  
   
                                                      
  
  
Neck pain, chronic  
  
  
Cervicalgia  
  
documented in this encounter  
[facility] note*   
  
                                                    Diagnosis  
   
                                                      
  
  
Hoarse- Primary  
  
  
Dysphonia  
   
                                                      
  
  
Pharyngoesophageal dysphagia  
  
  
Dysphagia, pharyngoesophageal phase  
   
                                                      
  
  
Asthma, unspecified asthma severity, unspecified whether complicated, 
unspecified   
whether persistent  
  
documented in this encounter  
[facility] note*   
  
                                                    Diagnosis  
   
                                                      
  
  
Cervical spondylosis- Primary  
  
  
Cervical spondylosis without myelopathy  
   
                                                      
  
  
Neck pain  
  
  
Cervicalgia  
   
                                                      
  
  
Status post cervical spinal fusion  
  
  
Arthrodesis status  
  
documented in this encounter  
[facility] note*   
  
                                                    Diagnosis  
   
                                                      
  
  
Medicare annual wellness visit, subsequent- Primary  
  
  
Routine general medical examination at a health care facility  
   
                                                      
  
  
Mild intermittent asthma without complication  
  
  
Unspecified asthma  
   
                                                      
  
  
Essential hypertension  
  
  
Unspecified essential hypertension  
   
                                                      
  
  
Hyperlipidemia, mixed  
  
  
Mixed hyperlipidemia  
   
                                                      
  
  
IFG (impaired fasting glucose)  
  
  
Impaired fasting glucose  
   
                                                      
  
  
Mild intermittent asthma, uncomplicated  
  
  
Unspecified asthma  
   
                                                      
  
  
Headaches  
   
                                                      
  
  
Vitamin D deficiency  
  
  
Unspecified vitamin D deficiency  
   
                                                      
  
  
Hypomagnesemia  
  
  
Disorders of magnesium metabolism  
  
documented in this encounter  
[facility]   
  
                                                    Diagnosis  
   
                                                      
  
  
Chronic cough- Primary  
  
  
Cough  
   
                                                      
  
  
Hoarseness  
  
  
Dysphonia  
   
                                                      
  
  
Dysphagia, unspecified type  
   
                                                      
  
  
SOB (shortness of breath)  
  
  
Shortness of breath  
   
                                                      
  
  
Coronary artery disease due to lipid rich plaque  
   
                                                      
  
  
Hyperlipidemia, mixed  
  
  
Mixed hyperlipidemia  
   
                                                      
  
  
Essential hypertension  
  
  
Unspecified essential hypertension  
   
                                                      
  
  
Hypercalcemia  
  
documented in this encounter  
[facility]   
  
                                                    Diagnosis  
   
                                                      
  
  
Chronic cough  
  
  
Cough  
   
                                                      
  
  
SOB (shortness of breath)  
  
  
Shortness of breath  
  
documented in this encounter  
[facility]   
  
                                                    Diagnosis  
   
                                                      
  
  
Hoarseness- Primary  
  
  
Dysphonia  
   
                                                      
  
  
Dysphagia, unspecified type  
   
                                                      
  
  
S/P cervical spinal fusion  
  
  
Arthrodesis status  
   
                                                      
  
  
Wheezing [R06.2]  
  
  
Wheezing  
   
                                                      
  
  
Episode of change in speech [R47.89]  
  
  
Other speech disturbance  
  
documented in this encounter  
[facility]   
  
                                                    Diagnosis  
   
                                                      
  
  
Mild intermittent asthma without complication  
  
  
Unspecified asthma  
   
                                                      
  
  
Coronary artery disease involving native heart without angina pectoris, 
unspecified   
vessel or lesion type  
  
documented in this encounter  
[facility]   
  
                                                    Diagnosis  
   
                                                      
  
  
Essential hypertension- Primary  
  
  
Unspecified essential hypertension  
  
documented in this encounter  
[facility]   
  
                                                    Diagnosis  
   
                                                      
  
  
Claustrophobia- Primary  
  
  
Other isolated or specific phobias  
  
documented in this encounter  
[facility]   
  
                                                    Diagnosis  
   
                                                      
  
  
Hoarseness  
  
  
Dysphonia  
   
                                                      
  
  
Dysphagia, unspecified type  
   
                                                      
  
  
S/P cervical spinal fusion  
  
  
Arthrodesis status  
  
documented in this encounter  
[facility]   
  
                                                    Diagnosis  
   
                                                      
  
  
Essential hypertension- Primary  
  
  
Unspecified essential hypertension  
   
                                                      
  
  
Mild intermittent asthma without complication  
  
  
Unspecified asthma  
   
                                                      
  
  
Seasonal allergies  
  
  
Allergic rhinitis, cause unspecified  
  
documented in this encounter  
[facility]   
  
                                                    Diagnosis  
   
                                                      
  
  
Hoarseness  
  
  
Dysphonia  
   
                                                      
  
  
Dysphagia, unspecified type  
   
                                                      
  
  
S/P cervical spinal fusion  
  
  
Arthrodesis status  
   
                                                      
  
  
Episode of change in speech [R47.89]  
  
  
Other speech disturbance  
   
                                                      
  
  
Wheezing [R06.2]  
  
  
Wheezing  
   
                                                      
  
  
Intractable migraine with aura without status migrainosus  
  
  
Migraine with aura, with intractable migraine, so stated, without mention of 
status   
migrainosus  
   
                                                      
  
  
Lacunar stroke (HCC)  
  
  
Unspecified cerebral artery occlusion with cerebral infarction  
  
documented in this encounter  
[facility] Discharge instructions* Instructions*   
  
Yasemin Vallejo PA-C - 2022  
  
  
  
Formatting of this note might be different from the original.  
Images from the original note were not included.  
  
DISCHARGE INSTRUCTIONS  
  
ANTERIOR CERVICAL DISCECTOMY FUSION  
  
Your surgeon has performed an operation on your cervical spine (neck) to relieve
 pressure on the spinal and/or nerves. This involved making an incision in the 
front of your neck an removing one or more of the discs that support your spine.
 Next, a small piece of bone, a titanium plate and screws were used to fuse two 
or more of the vertebrae (bones) together.  
  
The following are instructions to help in your recovery once you have been 
discharged from the hospital. Even if you feel well, it is important that you 
follow these guidelines. If you do not let your neck heal properly from the 
surgery, you can increase the chance of return of your symptoms and other 
complications.  
  
YOUR COLLAR:  
You will be given a cervical collar to wear postoperatively  
You must wear the collar at all times until seen in the office for your first 
post-operative visit.  
The collar may be removed for meals and hygiene.  
Limit bending, or motion at the neck while the brace is off.  
  
SLEEPING:  
You may sleep in any position that makes you comfortable as long as your collar 
is securely in place  
Many patients find comfort sleeping in a recliner chair  
It is normal to have difficulty sleeping for the first several weeks following 
your surgery.  
  
INCISION:  
Please make sure to check around your bandage at least twice daily for signs and
 symptoms of infection:  
ALWAYS wash your hands before touching your incision or the area surrounding it  
You do not have any sutures that need to be removed.  
You may remove the outer dressing at 7 days post-operatively  
There will be surgical glue over the incision- DO NOT pick or remove this glue, 
it will fall of naturally.  
DO NOT apply anything over your incision including lotions, creams, Neosporin, 
or scar creams.  
  
SHOWERING:  
You may shower as normal with your outer dressing still on. Once you have 
removed your outer dressing you may continue to shower as normal as well (see 
above).  
Hair washing is permissible while in the shower.  
No tub baths, hot tubs or whirlpools for 6 weeks.  
Keep Incision dry as possible- you may wash your incision daily with gentle soap
 and water.  
Do not scrub your incision, rub or scratching her incision.  
Use a soft cloth and lightly dab/pat dry your incision  
You may reapply a clean dry dressing to the incision daily after showering or 
leave open to air.  
  
EXERCISE/ ACTIVITY RESTRICTIONS:  
You have unlimited walking and stair climbing privileges. Walking outside (in 
nice weather and on stable ground) or walking on a treadmill (no incline) is 
also allowed  
Please get up and walk at least 4 times daily  
This will help prevent post-operative blood clots and promote circulation  
We recommend that you avoid strenuous exercise.  
Do not jog, run, bicycle or do any other strenuous exercise unless your doctor 
says that it is ok  
Avoid lifting or reaching above your head.  
Where possible, avoid household activities that involve lifting, reaching, 
pushing or pulling, suchas laundry, vacuuming and childcare  
Do NOT lift anything weighing greater than 10-15lbs.  
Talk to your doctor before resuming sexual activity  
  
PAIN:  
Take pain medication as prescribed. As your pain level decreases, you may begin 
to take over-the-counter Extra Strength Tylenol. The maximum Tylenol you are 
able to take is 3000 mg a day total. PLEASE AVOID ANTI-INFLAMMATORY MEDICATIONS 
(NSAIDS; ie: Mobic, ibuprofen, aleve, motrin, diclofenac) UNTIL YOUR DOCTOR OR 
NP/PA ADVISE OTHERWISE (TYPICALLY 3 MONTHS AFTER SURGERY)  
You should try to wean from your pain medicine in 2-3 weeks.  
It is not recommended to drive or operate heavy machinery while taking narcotic 
pain medication.  
You will be provided with a prescription for pain medication when you are 
discharged from the hospital  
Moving forward, you are to contact the office at [phone] or via [facility], 
for additional refills  
You can receive up to 2 additional refills after discharge  
Please contact our office 2-3 days before you run out of your medication, that 
way we can refill your prescription in a timely manner  
Refills will NOT be given over the weekend or after office hours 
(8:30am-4:00pm).  
Narcotic pain medicine can cause constipation. You can obtain the following 
medication to prevent constipation.  
Colace twice daily, may purchase over the counter. (May substitute with similar 
med such as Senna.)  
Miralax 1-2 times daily. Over the counter. Twice daily if you are feeling 
constipated.  
Magnesium Citrate. OTC. If no bowel movement in a few days and you are becoming 
uncomfortable.  
Enema. Over the counter. If no relief with above regiment.  
You may also be given a prescription for a steroid and a muscle relaxer to help 
with pain.  
  
DRIVING:  
You may NOT drive a car until told otherwise by your physician (usually at your 
first office visit)  
You may be a passenger for short distances (20-30 minutes).  
If you must take a longer trip, make sure to make several pit stops so that you 
can walk around andstretch your legs. Reclining the passenger seat seems to be 
the most comfortable position for most patients.  
  
FOLLOW-UP APPOINTMENTS:  
YOU WILL BE SCHEDULED FOR A 1 WEEK POST SURGERY, VIRTUAL VISIT/TELEPHONE CALL 
WITH ONE OF OUR ADVANCED PRACTICE PROVIDERS (NURSE PRACTITIONER OR PHYSICIAN 
ASSISTANT). THIS IS A TELEPHONE CALL ONLY. YOU DO NOT HAVE TO COME TO THE OFFICE
 FOR THIS VISIT. IF YOU ARE ACTIVE IN [place], IT WILL SHOW UP AS AN 
APPOINTMENT, BUT AGAIN, THIS IS A TELEPHONE CALL ONLY.  
You will be given a post op appointment for 2-3 weeks after your surgery date- 
you may see either your surgeon or one of our Advanced Practice Providers.  
  
QUESTIONS, CONCERNS and WHEN TO CALL US:  
You may experience pain in your neck and/or pain between your shoulder blades. 
This is NORMAL and should improve in the next few weeks with the help of pain 
medication, muscle relaxers and rest. Somepatients report that a warm compress 
on the back of the neck or between the shoulder blades helps  
Should you experience any of the following: CONTACT US IMMEDIATELY:  
New numbness or weakness  
Pain that is progressively getting worse and is not relieved by your pain 
medication, muscle relaxers, rest and warm compresses  
Bleeding, redness, swelling, pain, drainage from your surgical incision or if 
your incision is opening  
Chills or flu-like symptoms  
Fever greater than 101.0 F  
Inability to eat, drink fluids or take medications  
Problems with bowel or bladder functions  
Difficulty breathing or shortness of breath  
Warmth, tenderness or swelling in your calf  
  
CONTACT/OFFICE INFORMATION:  
If you have any additional questions/concerns, please contact the office at 
[phone]  
[facility] message  
For life-threathening emergency, please call 911  
  
  
  
  
  
  
documented in this encounterSUMMA  
Work Phone: [phone] for referral (narrative)* Diagnostic Procedure 
  Only (Routine) - Closed  
  
                          Specialty    Diagnoses / Procedures Referred By Contac  
t Referred To Contact  
   
                                                    MOLECULAR &   
FUNCTIONAL IMAGING                        
  
  
Diagnoses  
  
  
Coronary artery disease   
involving native heart   
without angina pectoris,   
unspecified vessel or   
lesion type  
  
  
Hyperlipidemia, mixed  
  
  
Encounter for other   
preprocedural   
examination  
  
  
Coronary angioplasty   
status  
  
  
  
Procedures  
  
  
NM CARDIAC PERF   
STRESS/PHARM  
  
  
MYOCARDIAL SPECT   
MULTIPLE STUDIES                          
  
  
Jolene Liang,   
APRN.CNP  
  
  
[address]   
[address]  
  
  
[place] [address]  
  
  
Phone: [phone]  
  
  
Fax: [phone]                         
  
  
Molecular &   
Functional Imaging  
  
  
[address] [place] [address]  
  
  
Phone: [phone]  
  
  
  
                    Referral ID Status    Reason    Start Date Expiration Date V  
isits   
Requested                               Visits   
Authorized  
   
                                [id number]        Closed            
  
  
Auto-Generate  
d Referral      3/14/2022       2023       1               1  
  
  
  
  
Electronically signed by Jolene Liang APRN.CNP at 2022 6:10 AM EDT  
  
  
[facility] for referral (narrative)* Outpatient Procedure (Routine) 
  - Authorized  
  
                          Specialty    Diagnoses / Procedures Referred By Contac  
t Referred To Contact  
   
                                                    [facility]   
INSTITUTE                                 
  
  
Diagnoses  
  
  
Bilateral leg pain  
  
  
  
Procedures  
  
  
US LEG VEIN DVT UNL   
VAS LAB  
  
  
DUP-SCAN XTR VEINS   
UNILATERAL/LIMITED   
STUDY                                     
  
  
Maria E Guzmán APRN.CNP  
  
  
[address] [place] [address]  
  
  
Phone: [phone]  
  
  
Fax: [phone]                         
  
  
[facility]   
[place]  
  
  
[address] [place] [address]  
  
  
  
                          Referral ID  Status       Reason       Start   
Date                                    Expiration   
Date                                    Visits   
Requested                               Visits   
Authorized  
   
                                [id number]        Authorized        
  
  
Auto-Generat  
ed Referral                               
2                   2023          1                   1  
  
  
  
  
Electronically signed by Maria E Guzmán APRN.CNP at 2022 12:42 PM EST  
  
  
* Diagnostic Procedure Only (Routine) - Pending Review  
  
                          Specialty    Diagnoses / Procedures Referred By Contac  
t Referred To Contact  
   
                                        [facility]            
  
  
Diagnoses  
  
  
Bilateral leg pain  
  
  
  
Procedures  
  
  
US DVT LOWER BILAT  
  
  
DUP-SCAN XTR VEINS COMPLETE   
BILATERAL STUDY                           
  
  
Maria E Guzmán APRN.CNP  
  
  
[address] [place] [address]  
  
  
Phone: [phone]  
  
  
Fax: [phone]                         
  
  
[facility]  
  
  
  
                          Referral ID  Status       Reason       Start   
Date                                    Expiration   
Date                                    Visits   
Requested                               Visits   
Authorized  
   
                                        [id number]            Pending   
Review                                    
  
  
Auto-Generat  
ed Referral                               
2                   2023          1                   1  
  
  
  
  
Electronically signed by Maria E Guzmán APRN.CNP at 2022 12:42 PM EST  
  
  
* Physical Therapy (Routine) - Pending Review  
  
                          Specialty    Diagnoses / Procedures Referred By Contac  
t Referred To Contact  
   
                                                    [facility]   
[facility]                               
  
  
Diagnoses  
  
  
Injury of right   
shoulder, subsequent   
encounter  
  
  
Fall, subsequent   
encounter  
  
  
Neck pain, chronic  
  
  
  
Procedures  
  
  
CONSULT TO PHYSICAL   
THERAPY  
  
  
PHYSICAL THERAPY   
EVALUATION HIGH   
COMPLEX 45 MINS                           
  
  
Maria E Guzmán APRN.CNP  
  
  
[address] [place] [address]  
  
  
Phone: [phone]  
  
  
Fax: [phone]                         
  
  
[facility]   
[facility]  
  
  
[address]  
  
  
[place] [address]  
  
  
  
                          Referral ID  Status       Reason       Start   
Date                                    Expiration   
Date                                    Visits   
Requested                               Visits   
Authorized  
   
                                        [id number]            Pending   
Review                                    
  
  
Auto-Generat  
ed Referral                             2023          1                   1  
  
  
  
  
Electronically signed by Maria E NAVACNP at 2022 12:39 PM EST  
  
  
* Diagnostic Procedure Only (Routine) - Closed  
  
                          Specialty    Diagnoses / Procedures Referred By Contac  
t Referred To Contact  
   
                                        [facility]            
  
  
Diagnoses  
  
  
Injury of right shoulder,   
subsequent encounter  
  
  
Fall, subsequent encounter  
  
  
Neck pain, chronic  
  
  
  
Procedures  
  
  
XR CERV OTHER 4V AP/LAT/OBL  
  
  
RADEX SPINE CERVICAL 4 OR 5   
VIEWS                                     
  
  
Maria E Guzmán APRN.CNP  
  
  
[address] [place] [address]  
  
  
Phone: [phone]  
  
  
Fax: [phone]                         
  
  
[facility]  
  
  
  
                    Referral ID Status    Reason    Start Date Expiration Date V  
isits   
Requested                               Visits   
Authorized  
   
                                [id number]        Closed            
  
  
Auto-Generate  
d Referral      2022      1               1  
  
  
  
  
Electronically signed by Maria E Guzmán APRN.CNP at 2022 12:39 PM EST  
  
  
* Diagnostic Procedure Only (Routine) - Closed  
  
                          Specialty    Diagnoses / Procedures Referred By Contac  
t Referred To Contact  
   
                                        XR IMAGING            
  
  
Diagnoses  
  
  
Injury of right shoulder,   
subsequent encounter  
  
  
Fall, subsequent encounter  
  
  
Neck pain, chronic  
  
  
  
Procedures  
  
  
XR HUMERUS 2V AP/LAT RIGHT  
  
  
RADEX HUMERUS MINIMUM 2   
VIEWS                                     
  
  
Maria E Guzmán APRN.CNP  
  
  
[address] [place] [address]  
  
  
Phone: [phone]  
  
  
Fax: [phone]                         
  
  
Xr Imaging  
  
  
  
                    Referral ID Status    Reason    Start Date Expiration Date V  
isits   
Requested                               Visits   
Authorized  
   
                                [id number]        Closed            
  
  
Auto-Generate  
d Referral      2022      1               1  
  
  
  
  
Electronically signed by Maria E Guzmán APRN.CNP at 2022 12:39 PM EST  
  
  
* Diagnostic Procedure Only (Routine) - Closed  
  
                          Specialty    Diagnoses / Procedures Referred By Contac  
t Referred To Contact  
   
                                        XR IMAGING            
  
  
Diagnoses  
  
  
Injury of right shoulder,   
subsequent encounter  
  
  
Fall, subsequent encounter  
  
  
Neck pain, chronic  
  
  
  
Procedures  
  
  
XR SHOULDER GENERAL 3V OR   
MORE AP/TRUE AP/OTHER RIGHT  
  
  
RADEX SHOULDER COMPLETE   
MINIMUM 2 VIEWS                           
  
  
Maria E Guzmán APRN.CNP  
  
  
[address] [place] [address]  
  
  
Phone: [phone]  
  
  
Fax: [phone]                         
  
  
Xr Imaging  
  
  
  
                    Referral ID Status    Reason    Start Date Expiration Date V  
isits   
Requested                               Visits   
Authorized  
   
                                [id number]        Closed            
  
  
Auto-Generate  
d Referral      2022      1               1  
  
  
  
  
Electronically signed by Maria E Guzmán APRN.CNP at 2022 12:39 PM [facility] for referral (narrative)* Outpatient Procedure (Routine) 
  - Authorized  
  
                          Specialty    Diagnoses / Procedures Referred By Contac  
t Referred To Contact  
   
                                                    DIGESTIVE DISEASE   
INSTITUTE                                 
  
  
Diagnoses  
  
  
Screening for colon   
cancer  
  
  
  
Procedures  
  
  
COLONOSCOPY SCREENING  
  
  
COLONOSCOPY FLX DX   
W/COLLJ SPEC WHEN   
PFRMD                                     
  
  
Bebeto Rodriguez DO  
  
  
[address] [place] [address]  
  
  
Phone: [phone]  
  
  
Fax: [phone]                         
  
  
[facility]   
[address] [address]  
  
  
  
                          Referral ID  Status       Reason       Start   
Date                                    Expiration   
Date                                    Visits   
Requested                               Visits   
Authorized  
   
                                [id number]        Authorized        
  
  
Auto-Generat  
ed Referral                             2023          1                   1  
  
  
  
  
Electronically signed by Bebeto Rodriguez DO at 2022 8:23 AM [facility] for referral (narrative)* Outpatient Procedure (Routine) 
  - Authorized  
  
                          Specialty    Diagnoses / Procedures Referred By Contac  
t Referred To Contact  
   
                                                    HEART AND VASCULAR   
INSTITUTE                                 
  
  
Diagnoses  
  
  
Chronic cough  
  
  
SOB (shortness of   
breath)  
  
  
Coronary artery disease   
due to lipid rich plaque  
  
  
Hyperlipidemia, mixed  
  
  
Essential hypertension  
  
  
  
Procedures  
  
  
ECHO  
  
  
ECHO TTHRC R-T 2D   
W/WOM-MODE COMPL   
SPEC&COLR D                               
  
  
Bebeto Rodriguez DO  
  
  
[address] [place] [address]  
  
  
Phone: [phone]  
  
  
Fax: [phone]                         
  
  
[facility]   
[address] [address]  
  
  
  
                          Referral ID  Status       Reason       Start   
Date                                    Expiration   
Date                                    Visits   
Requested                               Visits   
Authorized  
   
                                [id number]        Authorized        
  
  
Auto-Generat  
ed Referral     2024        1               1  
  
  
  
  
Electronically signed by Bebeto Rodriguez DO at 2024 11:41 AM EDT  
  
  
* Consult, Test, Treat (Routine) - Authorized  
  
                          Specialty    Diagnoses / Procedures Referred By Contac  
t Referred To Contact  
   
                                        Neurology             
  
  
Diagnoses  
  
  
Hoarseness  
  
  
Dysphagia, unspecified type  
  
  
Swallowing disorder  
  
  
  
Procedures  
  
  
CONSULT TO NEUROLOGY  
  
  
OFFICE/OUTPATIENT [facility] 60 MINUTES                            
  
  
Bebeto Rodriguez DO  
  
  
[address] [place] [address]  
  
  
Phone: [phone]  
  
  
Fax: [phone]                         
  
  
  
  
  
                          Referral ID  Status       Reason       Start   
Date                                    Expiration   
Date                                    Visits   
Requested                               Visits   
Authorized  
   
                                [id number]        Authorized        
  
  
PCP Requested   
Referral        2024        1               1  
  
  
  
  
Electronically signed by Bebeto Rodriguez DO at 2024 11:36 AM EDT  
  
  
[facility] Diagnostic Procedure Only (Routine) 
  - Closed  
  
                          Specialty    Diagnoses / Procedures Referred By Contac  
t Referred To Contact  
   
                                                    MOLECULAR &   
FUNCTIONAL IMAGING                        
  
  
Diagnoses  
  
  
Coronary artery disease   
involving native heart   
without angina pectoris,   
unspecified vessel or   
lesion type  
  
  
Hyperlipidemia, mixed  
  
  
Encounter for other   
preprocedural   
examination  
  
  
Coronary angioplasty   
status  
  
  
  
Procedures  
  
  
NM CARDIAC PERF   
STRESS/PHARM  
  
  
MYOCARDIAL SPECT   
MULTIPLE STUDIES                          
  
  
Jolene Liang,   
APRN.CNP  
  
  
[address]   
[address]  
  
  
[place] [address]  
  
  
Phone: [phone]  
  
  
Fax: [phone]                         
  
  
Molecular &   
Functional Imaging  
  
  
[address] [address]  
  
  
Phone: [phone]  
  
  
  
                    Referral ID Status    Reason    Start Date Expiration Date V  
isits   
Requested                               Visits   
Authorized  
   
                                [id number]        Closed            
  
  
Auto-Generate  
d Referral      3/14/2022       2023       1               1  
  
  
  
[facility]  
  
Assessments  
  
  
                                                    Diagnosis  
   
                                                    Pre-operative examination -   
Primary  
   
                                                      
  
  
Unspecified pre-operative examination  
   
                                                    Essential (primary) hyperten  
marina  
   
                                                      
  
  
Unspecified essential hypertension  
   
                                                    Coronary artery disease, ang  
jermaine presence unspecified, unspecified vessel or   
lesion   
type, unspecified whether native or transplanted heart  
   
                                                    Hyperlipidemia, unspecified   
hyperlipidemia type  
   
                                                    Dislocation of intraocular l  
ens, initial encounter  
  
  
  
Summary Purpose  
  
  
                                                      
  
  
  
Family History  
No Family History Records FoundNo Family History Records FoundNo Family History 
Records FoundNo Family History Records FoundNo Family History Records FoundNo 
Family History Records FoundNo Family History Records FoundNo Family History 
Records FoundNo Family History Records FoundNo Family History Records Found  
  
Advance Directives  
No Advanced Directives Records FoundLatest Code Status on File  
  
                          Code Status  Date Activated Date Inactivated Comments  
   
                          Full Code    2022 5:45 AM 2022 1:23 PM   
  
  
  
Reason for Referral  
  
  
                          Specialty    Diagnoses / Procedures Referred By Contac  
t Referred To Contact  
   
                                        Radiology             
  
  
Diagnoses  
  
  
Spondylolisthesis of cervical   
region  
  
  
Cervical radiculopathy  
  
  
  
Procedures  
  
  
MRI CERVICAL SPINE WO CONTRAST            
  
  
Jo Ann Bonilla MD  
  
  
[address] [place] [address]  
  
  
Phone: [phone]  
  
  
Fax: [phone]                         
  
  
  
  
  
                Referral ID Status  Reason  Start Date Expiration Date Visits Re  
quested Visits   
Authorized  
   
                [id number] Closed          2022 1       1  
  
  
  
                          Specialty    Diagnoses / Procedures Referred By Contac  
t Referred To Contact  
   
                                        Orthopedics           
  
  
Diagnoses  
  
  
Injury of right shoulder,   
subsequent encounter  
  
  
Fall, subsequent encounter  
  
  
Neck pain, chronic  
  
  
  
Procedures  
  
  
CONSULT TO ORTHOPAEDICS  
  
  
OFFICE/OUTPATIENT NEW [facility]   
[facility] 60-74 MINUTES                         
  
  
Maria E Guzmán,   
APRN.CNP  
  
  
[address] [place] [address]  
  
  
Phone: [phone]  
  
  
Fax: [phone]                         
  
  
  
  
  
                          Referral ID  Status       Reason       Start   
Date                                    Expiration   
Date                                    Visits   
Requested                               Visits   
Authorized  
   
                                        [id number]            Pending   
Review                                    
  
  
PCP Requested   
Referral                                2023          1                   1  
  
  
  
                          Specialty    Diagnoses / Procedures Referred By Contac  
t Referred To Contact  
   
                                                              
  
  
Diagnoses  
  
  
Yanet Marroquin, APRN.CNP  
  
  
[address] [place] [address]  
  
  
Phone: [phone]  
  
  
Fax: [phone]                         
  
  
  
  
  
                    Referral ID Status    Reason    Start Date Expiration Date V  
isits   
Requested                               Visits   
Authorized  
   
                [id number] Authorized         10/2/2023 10/31/2024 1       1  
  
  
  
                          Specialty    Diagnoses / Procedures Referred By Contac  
t Referred To Contact  
   
                                                              
  
  
Diagnoses  
  
  
Hoarseness  
  
  
Dysphagia, unspecified type  
  
  
Swallowing disorder  
  
  
  
Procedures  
  
  
CONSULT TO SPEECH THERAPY                 
  
  
Eugene Sepulveda Jr., MD  
  
  
[address] [facility] [address]  
  
  
[place] [address]  
  
  
Phone: [phone]  
  
  
Fax: [phone]                         
  
  
  
  
  
                          Referral ID  Status       Reason       Start   
Date                                    Expiration   
Date                                    Visits   
Requested                               Visits   
Authorized  
   
                                        [id number]            Pending   
Review                                    
  
  
PCP Requested   
Referral        2024       10/10/2024      3               3  
  
  
  
                          Specialty    Diagnoses / Procedures Referred By Contac  
t Referred To Contact  
   
                                        MR IMAGING            
  
  
Diagnoses  
  
  
Hoarseness  
  
  
Dysphagia, unspecified type  
  
  
Swallowing disorder  
  
  
S/P cervical spinal fusion  
  
  
  
Procedures  
  
  
MRA CAROTID WO IVCON  
  
  
MRA, NECK; W/O CONTRAST                   
  
  
Eugene Sepulveda Jr., MD  
  
  
[address] [facility] [address]  
  
  
[place] [address]  
  
  
Phone: [phone]  
  
  
Fax: [phone]                         
  
  
[facility]  
  
  
[place] [address]  
  
  
  
                          Referral ID  Status       Reason       Start   
Date                                    Expiration   
Date                                    Visits   
Requested                               Visits   
Authorized  
   
                                [id number]        Authorized        
  
  
Auto-Generat  
ed Referral     2024       1               1  
  
  
  
                          Specialty    Diagnoses / Procedures Referred By Contac  
t Referred To Contact  
   
                                        MR IMAGING            
  
  
Diagnoses  
  
  
Hoarseness  
  
  
Dysphagia, unspecified type  
  
  
Swallowing disorder  
  
  
S/P cervical spinal fusion  
  
  
  
Procedures  
  
  
MRA BRAIN WO IVCON  
  
  
MRA, HEAD W/O CONTRAST                    
  
  
Eugene Sepulveda Jr., MD  
  
  
[address] [facility] [address]  
  
  
[place] [address]  
  
  
Phone: [phone]  
  
  
Fax: [phone]                         
  
  
[facility]  
  
  
[place] [address]  
  
  
  
                          Referral ID  Status       Reason       Start   
Date                                    Expiration   
Date                                    Visits   
Requested                               Visits   
Authorized  
   
                                [id number]        Authorized        
  
  
Auto-Generat  
ed Referral     2024       1               1  
  
  
  
                          Specialty    Diagnoses / Procedures Referred By Contac  
t Referred To Contact  
   
                                        MR IMAGING            
  
  
Diagnoses  
  
  
Hoarseness  
  
  
Dysphagia, unspecified type  
  
  
Swallowing disorder  
  
  
S/P cervical spinal fusion  
  
  
  
Procedures  
  
  
MRI BRAIN WO IVCON  
  
  
MRI BRAIN BRAIN STEM W/O   
CONTRAST MATERIAL                         
  
  
Eugene Sepulveda Jr., MD  
  
  
[address] [facility] [address]  
  
  
[place] [address]  
  
  
Phone: [phone]  
  
  
Fax: [phone]                         
  
  
[facility]  
  
  
[place] [address]  
  
  
  
                          Referral ID  Status       Reason       Start   
Date                                    Expiration   
Date                                    Visits   
Requested                               Visits   
Authorized  
   
                                [id number]        Authorized        
  
  
Auto-Generat  
ed Referral     2024       1               1  
  
  
  
                          Specialty    Diagnoses / Procedures Referred By Contac  
t Referred To Contact  
   
                                                              
  
  
Diagnoses  
  
  
Mild intermittent asthma   
without complication  
                                          
  
  
Yanet Shen, APRN.CNP  
  
  
[address] [place] [address]  
  
  
Phone: [phone]  
  
  
Fax: [phone]                         
  
  
  
  
  
                Referral ID Status  Reason  Start Date Expiration Date Visits Re  
quested Visits   
Authorized  
   
                [id number] Denied                          1       1  
  
  
  
                    Referral ID Status    Reason    Start Date Expiration Date V  
isits   
Requested                               Visits   
Authorized  
   
                                [id number]        Closed            
  
  
Auto-Generate  
d Referral      2024       1               1  
  
  
  
                    Referral ID Status    Reason    Start Date Expiration Date V  
isits   
Requested                               Visits   
Authorized  
   
                                [id number]        Closed            
  
  
Auto-Generate  
d Referral      2024       1               1  
  
  
  
                    Referral ID Status    Reason    Start Date Expiration Date V  
isits   
Requested                               Visits   
Authorized  
   
                                [id number]        Closed            
  
  
Auto-Generate  
d Referral      2024       1               1  
  
  
  
Additional Source Comments  
  
  
  
                                                    INFORMATION SOURCE (unrecogn  
ized section and content)  
   
                                          
  
  
  
                                        DATE CREATED        AUTHOR  
   
                                2018                      [facility]  
enter  
  
  
  
                                DATE CREATED    AUTHOR          AUTHOR'S ORGANIZ  
ATION  
   
                                2019                      [facility]  
  
  
  
                                DATE CREATED    AUTHOR          AUTHOR'S ORGANIZ  
ATION  
   
                                2022                      [facility]  
  
  
  
                                DATE CREATED    AUTHOR          AUTHOR'S ORGANIZ  
ATION  
   
                                2022                      [facility]  
dical Center  
  
  
  
                                DATE CREATED    AUTHOR          AUTHOR'S ORGANIZ  
ATION  
   
                                2022                      [facility]  
tem  
  
  
  
                                DATE CREATED    AUTHOR          AUTHOR'S ORGANIZ  
ATION  
   
                                2022                      [facility]  
dical Center  
  
  
  
                                DATE CREATED    AUTHOR          AUTHOR'S ORGANIZ  
ATION  
   
                                2022                      [facility]  
tem  
  
  
  
                                DATE CREATED    AUTHOR          AUTHOR'S ORGANIZ  
ATION  
   
                                2023                      [facility]  
latory  
  
  
  
                                DATE CREATED    AUTHOR          AUTHOR'S ORGANIZ  
ATION  
   
                                2023                      [facility]  
tem [facility]  
  
  
  
                                DATE CREATED    AUTHOR          AUTHOR'S ORGANIZ  
ATION  
   
                                2024                      [facility]  
  
  
  
  
  
                                                    Reason for Visit (unrecogniz  
ed section and content)  
   
                                          
  
  
  
                                        Reason              Comments  
   
                                        Physical Therapy      
  
  
  
                          Specialty    Diagnoses / Procedures Referred By Contac  
t Referred To Contact  
   
                                                    [facility]   
[facility]                               
  
  
Diagnoses  
  
  
Injury of right   
shoulder, subsequent   
encounter  
  
  
Fall, subsequent   
encounter  
  
  
Neck pain, chronic  
  
  
  
Procedures  
  
  
CONSULT TO PHYSICAL   
THERAPY  
  
  
PHYSICAL THERAPY   
EVALUATION HIGH   
COMPLEX 45 MINS                           
  
  
Maria E Guzmán,   
APRN.CNP  
  
  
[address] [place] [address]  
  
  
Phone: [phone]  
  
  
Fax: [phone]                         
  
  
[facility]   
[facility]  
  
  
[address] [place] [address]  
  
  
  
                          Referral ID  Status       Reason       Start   
Date                                    Expiration   
Date                                    Visits   
Requested                               Visits   
Authorized  
   
                                [id number]        Authorized        
  
  
Auto-Generat  
ed Referral                               
2                   2022          99                  99  
  
  
  
                          Specialty    Diagnoses / Procedures Referred By Contac  
t Referred To Contact  
   
                                        Radiology             
  
  
Diagnoses  
  
  
Spondylolisthesis of cervical   
region  
  
  
Cervical radiculopathy  
  
  
  
Procedures  
  
  
MRI CERVICAL SPINE WO CONTRAST            
  
  
Jo Ann Bonilla MD  
  
  
[address] [place] [address]  
  
  
Phone: [phone]  
  
  
Fax: [phone]                         
  
  
  
  
  
                Referral ID Status  Reason  Start Date Expiration Date Visits Re  
quested Visits   
Authorized  
   
                [id number] Closed          2022 1       1  
  
  
  
                                Reason          Onset Date      Comments  
   
                                Refill Request  2022        
  
  
  
                                        Reason              Comments  
   
                                        Pre-Op Exam           
  
  
  
                                        Reason              Comments  
   
                                        Patient Update        
  
  
  
                                        Reason              Comments  
   
                                        Heart Problem         
   
                                        Establish Care        
  
  
  
                                        Reason              Comments  
   
                                        Returning Patient's Call   
  
  
  
                                Reason          Onset Date      Comments  
   
                                Refill Request  2022        
  
  
  
                                Reason          Onset Date      Comments  
   
                                Refill Request  2022        
  
  
  
                                        Reason              Comments  
   
                                        Patient Update        
   
                                        Patient Question      
  
  
  
                                        Reason              Comments  
   
                                        Fall                X 2 weeks ago  
   
                                        Arm Pain            Right arm  
   
                                        Left Knee Pain        
   
                                        Ankle Pain          Right  
  
  
  
                                        Reason              Comments  
   
                                        PT Eval               
  
  
  
                                        Reason              Comments  
   
                                        Patient Question      
  
  
  
                                        Reason              Comments  
   
                                        Blood Pressure        
  
  
  
                                        Reason              Comments  
   
                                        Results               
  
  
  
                                        Reason              Comments  
   
                                        Follow-up           Pressure L side and   
ear  
  
  
  
                                        Reason              Comments  
   
                                        Established Patient Follow-Up   
  
  
  
                                        Reason              Comments  
   
                                        Blood Pressure Check   
  
  
  
                                        Reason              Comments  
   
                                        Appointment           
  
  
  
                                        Reason              Comments  
   
                                        New Patient           
   
                                        Pain                  
  
  
  
                          Specialty    Diagnoses / Procedures Referred By Contac  
t Referred To Contact  
   
                                        Orthopedics           
  
  
Diagnoses  
  
  
Injury of right shoulder,   
subsequent encounter  
  
  
Fall, subsequent encounter  
  
  
Neck pain, chronic  
  
  
  
Procedures  
  
  
CONSULT TO ORTHOPAEDICS  
  
  
OFFICE/OUTPATIENT NEW HIGH   
MDM 60-74 MINUTES                         
  
  
Maria E Guzmán,   
APRN.CNP  
  
  
[address] [place] [address]  
  
  
Phone: [phone]  
  
  
Fax: [phone]                         
  
  
  
  
  
                          Referral ID  Status       Reason       Start   
Date                                    Expiration   
Date                                    Visits   
Requested                               Visits   
Authorized  
   
                                        [id number]            Pending   
Review                                    
  
  
PCP Requested   
Referral                                  
2                   2023          1                   1  
  
  
  
                                        Reason              Comments  
   
                                        3 MO FU EARS        EST PT  
  
  
  
                                        Reason              Comments  
   
                                        Neck Pain           0/10  
   
                                        Arm Pain            R 1/10  
  
  
  
                                Reason          Onset Date      Comments  
   
                                Refill Request  10/25/2023        
  
  
  
                                        Reason              Comments  
   
                                        Physical              
  
  
  
                                Reason          Onset Date      Comments  
   
                                Refill Request  2023        
  
  
  
                                        Reason              Comments  
   
                                        6 Month Exam          
  
  
  
                                Reason          Onset Date      Comments  
   
                                Refill Request  2024        
  
  
  
                                Reason          Onset Date      Comments  
   
                                Refill Request  2024        
  
  
  
                                        Reason              Comments  
   
                                        New Patient         New patient consult   
PCP, reported Hx spinal surgery neck region   
, c/o   
difficulty swallowing, change in voice.  
  
  
  
                          Specialty    Diagnoses / Procedures Referred By Contac  
t Referred To Contact  
   
                                        Neurology             
  
  
Diagnoses  
  
  
Hoarseness  
  
  
Dysphagia, unspecified type  
  
  
Swallowing disorder  
  
  
  
Procedures  
  
  
CONSULT TO NEUROLOGY  
  
  
OFFICE/OUTPATIENT NEW HIGH   
MDM 60 MINUTES                            
  
  
Bebeto Rodriguez L, DO  
  
  
[address] [place] [address]  
  
  
Phone: [phone]  
  
  
Fax: [phone]                         
  
  
  
  
  
                    Referral ID Status    Reason    Start Date Expiration Date V  
isits   
Requested                               Visits   
Authorized  
   
                                [id number]        Closed            
  
  
PCP Requested   
Referral        2024        1               1  
  
  
  
                                Reason          Onset Date      Comments  
   
                                Refill Request  07/15/2024        
  
  
  
                                        Reason              Comments  
   
                                        medication information   
  
  
  
                                        Reason              Comments  
   
                                        Patient Update      Blood Pressure  
  
  
  
                                        Reason              Comments  
   
                                        BP Check              
  
  
  
                                        Reason              Comments  
   
                                        Medication Request    
  
  
  
                          Specialty    Diagnoses / Procedures Referred By Contac  
t Referred To Contact  
   
                                        MR IMAGING            
  
  
Diagnoses  
  
  
Hoarseness  
  
  
Dysphagia, unspecified type  
  
  
Swallowing disorder  
  
  
S/P cervical spinal fusion  
  
  
  
Procedures  
  
  
MRI BRAIN WO IVCON  
  
  
MRI BRAIN BRAIN STEM W/O   
CONTRAST MATERIAL                         
  
  
Eugene Sepulveda Jr., MD  
  
  
[address] [facility] [address]  
  
  
[place] [address]  
  
  
Phone: [phone]  
  
  
Fax: [phone]                         
  
  
[facility]  
  
  
[place] [address]  
  
  
  
                    Referral ID Status    Reason    Start Date Expiration Date V  
isits   
Requested                               Visits   
Authorized  
   
                                [id number]        Closed            
  
  
Auto-Generate  
d Referral      2024       1               1  
  
  
  
                                        Reason              Comments  
   
                                        BP Check            Puffy under eyes x3   
weeks  
  
  
  
                                        Reason              Comments  
   
                                        Follow Up             
  
  
  
  
  
                                                    Care Teams (unrecognized sec  
tion and content)  
   
                                          
  
  
  
                      Team Member Relationship Specialty  Start Date End Date  
   
                                                      
  
  
Bebeto Rodriguez  
  
  
[address] [facility] [place] [address]  
  
  
[phone] (Work)  
  
  
[phone] (Fax) PCP - General   Family Medicine 22           
  
  
  
                      Team Member Relationship Specialty  Start Date End Date  
   
                                                      
  
  
Bebeto Rodriguez DO  
  
  
[address] [facility] [place] [address]  
  
  
[phone] (Work)  
  
  
[phone] (Fax) PCP - General   Family Practice 1/15/20           
  
  
  
                      Team Member Relationship Specialty  Start Date End Date  
   
                                                      
  
  
Bebeto Rodriguez DO  
  
  
[address] [facility] [place] [address]  
  
  
[phone] (Work)  
  
  
[phone] (Fax) PCP - General   Family Practice 1/15/20           
  
  
  
                      Team Member Relationship Specialty  Start Date End Date  
   
                                                      
  
  
Bebeto Rodriguez DO  
  
  
[address] [facility] [place] [address]  
  
  
[phone] (Work)  
  
  
[phone] (Fax) PCP - General   Family Practice 1/15/20           
  
  
  
                      Team Member Relationship Specialty  Start Date End Date  
   
                                                      
  
  
Bebeto Rodriguez  
  
  
[address] [facility] [place] [address]  
  
  
[phone] (Work)  
  
  
[phone] (Fax) PCP - General   Family Medicine 22           
  
  
  
                      Team Member Relationship Specialty  Start Date End Date  
   
                                                      
  
  
Bebeto Rodriguez DO  
  
  
[address] [facility] [place] [address]  
  
  
[phone] (Work)  
  
  
[phone] (Fax) PCP - General   Family Practice 1/15/20           
  
  
  
                      Team Member Relationship Specialty  Start Date End Date  
   
                                                      
  
  
Bebeto Rodriguez  
  
  
[address] [facility] [place] [address]  
  
  
[phone] (Work)  
  
  
[phone] (Fax) PCP - General   Family Medicine 22           
  
  
  
                      Team Member Relationship Specialty  Start Date End Date  
   
                                                      
  
  
Bebeto Rodriguez  
  
  
[address]  
  
  
[place] [address]  
  
  
[phone] (Work)  
  
  
[phone] (Fax) PCP - General   Family Medicine 22           
  
  
  
                      Team Member Relationship Specialty  Start Date End Date  
   
                                                      
  
  
Bebeto Rodriguez, DO  
  
  
[address]  
  
  
[place] [address]  
  
  
[phone] (Work)  
  
  
[phone] (Fax) PCP - General   Family Practice 1/15/20           
  
  
  
                      Team Member Relationship Specialty  Start Date End Date  
   
                                                      
  
  
Bebeto Rodriguez, DO  
  
  
[address]  
  
  
[place] [address]  
  
  
[phone] (Work)  
  
  
[phone] (Fax) PCP - General   Family Practice 1/15/20           
  
  
  
                      Team Member Relationship Specialty  Start Date End Date  
   
                                                      
  
  
Bebeto Rodriguez, DO  
  
  
[address]  
  
  
[place] [address]  
  
  
[phone] (Work)  
  
  
[phone] (Fax) PCP - General   Family Medicine 1/15/20           
  
  
  
                      Team Member Relationship Specialty  Start Date End Date  
   
                                                      
  
  
Bebeto Rodriguez, DO  
  
  
[address]  
  
  
[place] [address]  
  
  
[phone] (Work)  
  
  
[phone] (Fax) PCP - General   Family Medicine 1/15/20           
  
  
  
                      Team Member Relationship Specialty  Start Date End Date  
   
                                                      
  
  
Bebeto Rodriguez, DO  
  
  
[address]  
  
  
[place] [address]  
  
  
[phone] (Work)  
  
  
[phone] (Fax) PCP - General   Family Medicine 1/15/20           
  
  
  
                      Team Member Relationship Specialty  Start Date End Date  
   
                                                      
  
  
Bebeto Rodriguez, DO  
  
  
[address]  
  
  
[place] [address]  
  
  
[phone] (Work)  
  
  
[phone] (Fax) PCP - General   Family Medicine 1/15/20           
  
  
  
                      Team Member Relationship Specialty  Start Date End Date  
   
                                                      
  
  
Bebeto Rodriguez, DO  
  
  
[address]  
  
  
[place] [address]  
  
  
[phone] (Work)  
  
  
[phone] (Fax) PCP - General   Family Medicine 1/15/20           
  
  
  
                      Team Member Relationship Specialty  Start Date End Date  
   
                                                      
  
  
Bebeto Rodriguez, DO  
  
  
[address]  
  
  
[place] [address]  
  
  
[phone] (Work)  
  
  
[phone] (Fax) PCP - General   Family Medicine 1/15/20           
  
  
  
                      Team Member Relationship Specialty  Start Date End Date  
   
                                                      
  
  
Bebeto Rodriguez, DO  
  
  
[address]  
  
  
[place] [address]  
  
  
[phone] (Work)  
  
  
[phone] (Fax) PCP - General   Family Medicine 1/15/20           
  
  
  
                      Team Member Relationship Specialty  Start Date End Date  
   
                                                      
  
  
Bebeto Rodriguez, DO  
  
  
[address]  
  
  
[address]  
  
  
[place] [address]  
  
  
[phone] (Work)  
  
  
[phone] (Fax) PCP - General   Endocrinology/Metabolism 10/21/22          
  
  
  
                      Team Member Relationship Specialty  Start Date End Date  
   
                                                      
  
  
Bebeto Rodriguez, DO  
  
  
[address]  
  
  
[place] [address]  
  
  
[phone] (Work)  
  
  
[phone] (Fax) PCP - General   Family Medicine 1/15/20           
  
  
  
                      Team Member Relationship Specialty  Start Date End Date  
   
                                                      
  
  
Bebeto Rodriguez, DO  
  
  
[address]  
  
  
[place] [address]  
  
  
[phone] (Work)  
  
  
[phone] (Fax) PCP - General   Family Medicine 1/15/20           
  
  
  
                      Team Member Relationship Specialty  Start Date End Date  
   
                                                      
  
  
Bebeto Rodriguez, DO  
  
  
[address]  
  
  
[place] [address]  
  
  
[phone] (Work)  
  
  
[phone] (Fax) PCP - General   Family Medicine 1/15/20           
  
  
  
                      Team Member Relationship Specialty  Start Date End Date  
   
                                                      
  
  
Bebeto Rodriguez, DO  
  
  
[address]  
  
  
[place] [address]  
  
  
[phone] (Work)  
  
  
[phone] (Fax) PCP - General   Family Medicine 1/15/20           
  
  
  
                      Team Member Relationship Specialty  Start Date End Date  
   
                                                      
  
  
Bebeto Rodriguez, DO  
  
  
[address]  
  
  
[place] [address]  
  
  
[phone] (Work)  
  
  
[phone] (Fax) PCP - General   Family Medicine 1/15/20           
  
  
  
                      Team Member Relationship Specialty  Start Date End Date  
   
                                                      
  
  
Bebeto Rodriguez, DO  
  
  
[address]  
  
  
[address]  
  
  
[place] [address]  
  
  
[phone] (Work)  
  
  
[phone] (Fax) PCP - General   Endocrinology/Metabolism 10/21/22          
  
  
  
                      Team Member Relationship Specialty  Start Date End Date  
   
                                                      
  
  
Bebeto Rodriguez  
  
  
[address]  
  
  
[place] [address]  
  
  
[phone] (Work)  
  
  
[phone] (Fax) PCP - General                   22           
  
  
  
                      Team Member Relationship Specialty  Start Date End Date  
   
                                                      
  
  
Bebeto Rodriguez DO  
  
  
NPI: [id number]  
  
  
[address] [facility] [place] [address]  
  
  
[phone] (Work)  
  
  
[phone] (Fax) PCP - General   Family Medicine 1/15/20           
  
  
  
                      Team Member Relationship Specialty  Start Date End Date  
   
                                                      
  
  
Bebeto Rodriguez DO  
  
  
NPI: [id number]  
  
  
[address] [facility] [place] [address]  
  
  
[phone] (Work)  
  
  
[phone] (Fax) PCP - General   Family Medicine 1/15/20           
  
  
  
                      Team Member Relationship Specialty  Start Date End Date  
   
                                                      
  
  
Bebeto Rodriguez DO  
  
  
NPI: [id number]  
  
  
[address] [place] [address]  
  
  
[phone] (Work)  
  
  
[phone] (Fax) PCP - General   Family Medicine 1/15/20           
  
  
  
                      Team Member Relationship Specialty  Start Date End Date  
   
                                                      
  
  
Bebeto Rodriguez DO  
  
  
NPI: [id number]  
  
  
[address] [facility] [place] [address]  
  
  
[phone] (Work)  
  
  
[phone] (Fax) PCP - General   Family Medicine 1/15/20           
  
  
  
                      Team Member Relationship Specialty  Start Date End Date  
   
                                                      
  
  
Bebeto Rodriguez DO  
  
  
NPI: [id number]  
  
  
[address] [facility] [place] [address]  
  
  
[phone] (Work)  
  
  
[phone] (Fax) PCP - General   Family Medicine 1/15/20           
  
  
  
                      Team Member Relationship Specialty  Start Date End Date  
   
                                                      
  
  
Bebeto Rodriguez DO  
  
  
NPI: [id number]  
  
  
[address] [facility] [place] [address]  
  
  
[phone] (Work)  
  
  
[phone] (Fax) PCP - General   Family Medicine 1/15/20           
  
  
  
                      Team Member Relationship Specialty  Start Date End Date  
   
                                                      
  
  
Bebeto Rodriguez DO  
  
  
NPI: [id number]  
  
  
[address] [facility] [place] [address]  
  
  
[phone] (Work)  
  
  
[phone] (Fax) PCP - General   Family Medicine 1/15/20           
  
  
  
                      Team Member Relationship Specialty  Start Date End Date  
   
                                                      
  
  
Bebeto Rodriguez DO  
  
  
NPI: [id number]  
  
  
[address] [facility] [place] [address]  
  
  
[phone] (Work)  
  
  
[phone] (Fax) PCP - General   Family Medicine 1/15/20           
  
  
  
                      Team Member Relationship Specialty  Start Date End Date  
   
                                                      
  
  
Bebeto Rodriguez DO  
  
  
NPI: [id number]  
  
  
[address] [facility] [place] [address]  
  
  
[phone] (Work)  
  
  
[phone] (Fax) PCP - General   Family Medicine 1/15/20           
  
  
  
                      Team Member Relationship Specialty  Start Date End Date  
   
                                                      
  
  
Bebeto Rodriguez DO  
  
  
NPI: [id number]  
  
  
[address] [facility] [place] [address]  
  
  
[phone] (Work)  
  
  
[phone] (Fax) PCP - General   Family Medicine 1/15/20           
  
  
  
                      Team Member Relationship Specialty  Start Date End Date  
   
                                                      
  
  
Bebeto Rodriguez DO  
  
  
NPI: [id number]  
  
  
[address] [facility] [place] [address]  
  
  
[phone] (Work)  
  
  
[phone] (Fax) PCP - General   Family Medicine 1/15/20           
  
  
  
                      Team Member Relationship Specialty  Start Date End Date  
   
                                                      
  
  
Bebeto Rodriguez DO  
  
  
NPI: [id number]  
  
  
[address] [facility] [place] [address]  
  
  
[phone] (Work)  
  
  
[phone] (Fax) PCP - General   Family Medicine 1/15/20           
  
  
  
                      Team Member Relationship Specialty  Start Date End Date  
   
                                                      
  
  
Bebeto Rodriguez DO  
  
  
NPI: [id number]  
  
  
[address] [facility] [place] [address]  
  
  
[phone] (Work)  
  
  
[phone] (Fax) PCP - General   Family Medicine 1/15/20           
  
  
  
  
  
                                                    Source Comments (unrecognize  
d section and content)  
   
                                                    In the event this informatio  
n is protected by the Federal Confidentiality of   
Alcohol   
and Drug Abuse Patient Records regulations: This information has been disclosed 
to   
you from records protected by Federal confidentiality rules (42 CFR Part 2). The
   
Federal rules prohibit you from making any further disclosure of this 
information   
unless further disclosure is expressly permitted by the written consent of the 
person   
to whom it pertains or as otherwise permitted by 42 CFR Part 2. A general   
authorization for the release of medical or other information is NOT sufficient 
for   
this purpose. The Federal rules restrict any use of the information to 
criminally   
investigate or prosecute any alcohol or drug abuse patient.[facility] SouthIn 
the   
event this information is protected by the Federal Confidentiality of Alcohol 
and   
Drug Abuse Patient Records regulations: This information has been disclosed to 
you   
from records protected by Federal confidentiality rules (42 CFR Part 2). The 
Federal   
rules prohibit you from making any further disclosure of this information unless
   
further disclosure is expressly permitted by the written consent of the person 
to   
whom it pertains or as otherwise permitted by 42 CFR Part 2. A general 
authorization   
for the release of medical or other information is NOT sufficient for this 
purpose.   
The Federal rules restrict any use of the information to criminally investigate 
or   
prosecute any alcohol or drug abuse patient.[facility] SouthIn the event this   
information is protected by the Federal Confidentiality of Alcohol and Drug 
Abuse   
Patient Records regulations: This information has been disclosed to you from 
records   
protected by Federal confidentiality rules (42 CFR Part 2). The Federal rules   
prohibit you from making any further disclosure of this information unless 
further   
disclosure is expressly permitted by the written consent of the person to whom 
it   
pertains or as otherwise permitted by 42 CFR Part 2. A general authorization for
 the   
release of medical or other information is NOT sufficient for this purpose. The   
Federal rules restrict any use of the information to criminally investigate or   
prosecute any alcohol or drug abuse patient.[facility] SouthIn the event this   
information is protected by the Federal Confidentiality of Alcohol and Drug 
Abuse   
Patient Records regulations: This information has been disclosed to you from 
records   
protected by Federal confidentiality rules (42 CFR Part 2). The Federal rules   
prohibit you from making any further disclosure of this information unless 
further   
disclosure is expressly permitted by the written consent of the person to whom 
it   
pertains or as otherwise permitted by 42 CFR Part 2. A general authorization for
 the   
release of medical or other information is NOT sufficient for this purpose. The   
Federal rules restrict any use of the information to criminally investigate or   
prosecute any alcohol or drug abuse patient.[facility] SouthIn the event this   
information is protected by the Federal Confidentiality of Alcohol and Drug 
Abuse   
Patient Records regulations: This information has been disclosed to you from 
records   
protected by Federal confidentiality rules (42 CFR Part 2). The Federal rules   
prohibit you from making any further disclosure of this information unless 
further   
disclosure is expressly permitted by the written consent of the person to whom 
it   
pertains or as otherwise permitted by 42 CFR Part 2. A general authorization for
 the   
release of medical or other information is NOT sufficient for this purpose. The   
Federal rules restrict any use of the information to criminally investigate or   
prosecute any alcohol or drug abuse patient.[facility] SouthIn the event this   
information is protected by the Federal Confidentiality of Alcohol and Drug 
Abuse   
Patient Records regulations: This information has been disclosed to you from 
records   
protected by Federal confidentiality rules (42 CFR Part 2). The Federal rules   
prohibit you from making any further disclosure of this information unless 
further   
disclosure is expressly permitted by the written consent of the person to whom 
it   
pertains or as otherwise permitted by 42 CFR Part 2. A general authorization for
 the   
release of medical or other information is NOT sufficient for this purpose. The   
Federal rules restrict any use of the information to criminally investigate or   
prosecute any alcohol or drug abuse patient.[facility]In the event this   
information is protected by the Federal Confidentiality of Alcohol and Drug 
Abuse   
Patient Records regulations: This information has been disclosed to you from 
records   
protected by Federal confidentiality rules (42 CFR Part 2). The Federal rules   
prohibit you from making any further disclosure of this information unless 
further   
disclosure is expressly permitted by the written consent of the person to whom 
it   
pertains or as otherwise permitted by 42 CFR Part 2. A general authorization for
 the   
release of medical or other information is NOT sufficient for this purpose. The   
Federal rules restrict any use of the information to criminally investigate or   
prosecute any alcohol or drug abuse patient.[facility]In the event this   
information is protected by the Federal Confidentiality of Alcohol and Drug 
Abuse   
Patient Records regulations: This information has been disclosed to you from 
records   
protected by Federal confidentiality rules (42 CFR Part 2). The Federal rules   
prohibit you from making any further disclosure of this information unless 
further   
disclosure is expressly permitted by the written consent of the person to whom 
it   
pertains or as otherwise permitted by 42 CFR Part 2. A general authorization for
 the   
release of medical or other information is NOT sufficient for this purpose. The   
Federal rules restrict any use of the information to criminally investigate or   
prosecute any alcohol or drug abuse patient.[facility]In the event this   
information is protected by the Federal Confidentiality of Alcohol and Drug 
Abuse   
Patient Records regulations: This information has been disclosed to you from 
records   
protected by Federal confidentiality rules (42 CFR Part 2). The Federal rules   
prohibit you from making any further disclosure of this information unless 
further   
disclosure is expressly permitted by the written consent of the person to whom 
it   
pertains or as otherwise permitted by 42 CFR Part 2. A general authorization for
 the   
release of medical or other information is NOT sufficient for this purpose. The   
Federal rules restrict any use of the information to criminally investigate or   
prosecute any alcohol or drug abuse patient.[facility]In the event this   
information is protected by the Federal Confidentiality of Alcohol and Drug 
Abuse   
Patient Records regulations: This information has been disclosed to you from 
records   
protected by Federal confidentiality rules (42 CFR Part 2). The Federal rules   
prohibit you from making any further disclosure of this information unless 
further   
disclosure is expressly permitted by the written consent of the person to whom 
it   
pertains or as otherwise permitted by 42 CFR Part 2. A general authorization for
 the   
release of medical or other information is NOT sufficient for this purpose. The   
Federal rules restrict any use of the information to criminally investigate or   
prosecute any alcohol or drug abuse patient.[facility]In the event this   
information is protected by the Federal Confidentiality of Alcohol and Drug 
Abuse   
Patient Records regulations: This information has been disclosed to you from 
records   
protected by Federal confidentiality rules (42 CFR Part 2). The Federal rules   
prohibit you from making any further disclosure of this information unless 
further   
disclosure is expressly permitted by the written consent of the person to whom 
it   
pertains or as otherwise permitted by 42 CFR Part 2. A general authorization for
 the   
release of medical or other information is NOT sufficient for this purpose. The   
Federal rules restrict any use of the information to criminally investigate or   
prosecute any alcohol or drug abuse patient.[facility]In the event this   
information is protected by the Federal Confidentiality of Alcohol and Drug 
Abuse   
Patient Records regulations: This information has been disclosed to you from 
records   
protected by Federal confidentiality rules (42 CFR Part 2). The Federal rules   
prohibit you from making any further disclosure of this information unless 
further   
disclosure is expressly permitted by the written consent of the person to whom 
it   
pertains or as otherwise permitted by 42 CFR Part 2. A general authorization for
 the   
release of medical or other information is NOT sufficient for this purpose. The   
Federal rules restrict any use of the information to criminally investigate or   
prosecute any alcohol or drug abuse patient.[facility]In the event this   
information is protected by the Federal Confidentiality of Alcohol and Drug 
Abuse   
Patient Records regulations: This information has been disclosed to you from 
records   
protected by Federal confidentiality rules (42 CFR Part 2). The Federal rules   
prohibit you from making any further disclosure of this information unless 
further   
disclosure is expressly permitted by the written consent of the person to whom 
it   
pertains or as otherwise permitted by 42 CFR Part 2. A general authorization for
 the   
release of medical or other information is NOT sufficient for this purpose. The   
Federal rules restrict any use of the information to criminally investigate or   
prosecute any alcohol or drug abuse patient.[facility]In the event this   
information is protected by the Federal Confidentiality of Alcohol and Drug 
Abuse   
Patient Records regulations: This information has been disclosed to you from 
records   
protected by Federal confidentiality rules (42 CFR Part 2). The Federal rules   
prohibit you from making any further disclosure of this information unless 
further   
disclosure is expressly permitted by the written consent of the person to whom 
it   
pertains or as otherwise permitted by 42 CFR Part 2. A general authorization for
 the   
release of medical or other information is NOT sufficient for this purpose. The   
Federal rules restrict any use of the information to criminally investigate or   
prosecute any alcohol or drug abuse patient.[facility]In the event this   
information is protected by the Federal Confidentiality of Alcohol and Drug 
Abuse   
Patient Records regulations: This information has been disclosed to you from 
records   
protected by Federal confidentiality rules (42 CFR Part 2). The Federal rules   
prohibit you from making any further disclosure of this information unless 
further   
disclosure is expressly permitted by the written consent of the person to whom 
it   
pertains or as otherwise permitted by 42 CFR Part 2. A general authorization for
 the   
release of medical or other information is NOT sufficient for this purpose. The   
Federal rules restrict any use of the information to criminally investigate or   
prosecute any alcohol or drug abuse patient.[facility]In the event this   
information is protected by the Federal Confidentiality of Alcohol and Drug 
Abuse   
Patient Records regulations: This information has been disclosed to you from 
records   
protected by Federal confidentiality rules (42 CFR Part 2). The Federal rules   
prohibit you from making any further disclosure of this information unless 
further   
disclosure is expressly permitted by the written consent of the person to whom 
it   
pertains or as otherwise permitted by 42 CFR Part 2. A general authorization for
 the   
release of medical or other information is NOT sufficient for this purpose. The   
Federal rules restrict any use of the information to criminally investigate or   
prosecute any alcohol or drug abuse patient.[facility]In the event this   
information is protected by the Federal Confidentiality of Alcohol and Drug 
Abuse   
Patient Records regulations: This information has been disclosed to you from 
records   
protected by Federal confidentiality rules (42 CFR Part 2). The Federal rules   
prohibit you from making any further disclosure of this information unless 
further   
disclosure is expressly permitted by the written consent of the person to whom 
it   
pertains or as otherwise permitted by 42 CFR Part 2. A general authorization for
 the   
release of medical or other information is NOT sufficient for this purpose. The   
Federal rules restrict any use of the information to criminally investigate or   
prosecute any alcohol or drug abuse patient.[facility]In the event this   
information is protected by the Federal Confidentiality of Alcohol and Drug 
Abuse   
Patient Records regulations: This information has been disclosed to you from 
records   
protected by Federal confidentiality rules (42 CFR Part 2). The Federal rules   
prohibit you from making any further disclosure of this information unless 
further   
disclosure is expressly permitted by the written consent of the person to whom 
it   
pertains or as otherwise permitted by 42 CFR Part 2. A general authorization for
 the   
release of medical or other information is NOT sufficient for this purpose. The   
Federal rules restrict any use of the information to criminally investigate or   
prosecute any alcohol or drug abuse patient.[facility]In the event this   
information is protected by the Federal Confidentiality of Alcohol and Drug 
Abuse   
Patient Records regulations: This information has been disclosed to you from 
records   
protected by Federal confidentiality rules (42 CFR Part 2). The Federal rules   
prohibit you from making any further disclosure of this information unless 
further   
disclosure is expressly permitted by the written consent of the person to whom 
it   
pertains or as otherwise permitted by 42 CFR Part 2. A general authorization for
 the   
release of medical or other information is NOT sufficient for this purpose. The   
Federal rules restrict any use of the information to criminally investigate or   
prosecute any alcohol or drug abuse patient.[facility]In the event this   
information is protected by the Federal Confidentiality of Alcohol and Drug 
Abuse   
Patient Records regulations: This information has been disclosed to you from 
records   
protected by Federal confidentiality rules (42 CFR Part 2). The Federal rules   
prohibit you from making any further disclosure of this information unless 
further   
disclosure is expressly permitted by the written consent of the person to whom 
it   
pertains or as otherwise permitted by 42 CFR Part 2. A general authorization for
 the   
release of medical or other information is NOT sufficient for this purpose. The   
Federal rules restrict any use of the information to criminally investigate or   
prosecute any alcohol or drug abuse patient.[facility]In the event this   
information is protected by the Federal Confidentiality of Alcohol and Drug 
Abuse   
Patient Records regulations: This information has been disclosed to you from 
records   
protected by Federal confidentiality rules (42 CFR Part 2). The Federal rules   
prohibit you from making any further disclosure of this information unless 
further   
disclosure is expressly permitted by the written consent of the person to whom 
it   
pertains or as otherwise permitted by 42 CFR Part 2. A general authorization for
 the   
release of medical or other information is NOT sufficient for this purpose. The   
Federal rules restrict any use of the information to criminally investigate or   
prosecute any alcohol or drug abuse patient.[facility]In the event this   
information is protected by the Federal Confidentiality of Alcohol and Drug 
Abuse   
Patient Records regulations: This information has been disclosed to you from 
records   
protected by Federal confidentiality rules (42 CFR Part 2). The Federal rules   
prohibit you from making any further disclosure of this information unless 
further   
disclosure is expressly permitted by the written consent of the person to whom 
it   
pertains or as otherwise permitted by 42 CFR Part 2. A general authorization for
 the   
release of medical or other information is NOT sufficient for this purpose. The   
Federal rules restrict any use of the information to criminally investigate or   
prosecute any alcohol or drug abuse patient.[facility]In the event this   
information is protected by the Federal Confidentiality of Alcohol and Drug 
Abuse   
Patient Records regulations: This information has been disclosed to you from 
records   
protected by Federal confidentiality rules (42 CFR Part 2). The Federal rules   
prohibit you from making any further disclosure of this information unless 
further   
disclosure is expressly permitted by the written consent of the person to whom 
it   
pertains or as otherwise permitted by 42 CFR Part 2. A general authorization for
 the   
release of medical or other information is NOT sufficient for this purpose. The   
Federal rules restrict any use of the information to criminally investigate or   
prosecute any alcohol or drug abuse patient.[facility]In the event this   
information is protected by the Federal Confidentiality of Alcohol and Drug 
Abuse   
Patient Records regulations: This information has been disclosed to you from 
records   
protected by Federal confidentiality rules (42 CFR Part 2). The Federal rules   
prohibit you from making any further disclosure of this information unless 
further   
disclosure is expressly permitted by the written consent of the person to whom 
it   
pertains or as otherwise permitted by 42 CFR Part 2. A general authorization for
 the   
release of medical or other information is NOT sufficient for this purpose. The   
Federal rules restrict any use of the information to criminally investigate or   
prosecute any alcohol or drug abuse patient.[facility]In the event this   
information is protected by the Federal Confidentiality of Alcohol and Drug 
Abuse   
Patient Records regulations: This information has been disclosed to you from 
records   
protected by Federal confidentiality rules (42 CFR Part 2). The Federal rules   
prohibit you from making any further disclosure of this information unless 
further   
disclosure is expressly permitted by the written consent of the person to whom 
it   
pertains or as otherwise permitted by 42 CFR Part 2. A general authorization for
 the   
release of medical or other information is NOT sufficient for this purpose. The   
Federal rules restrict any use of the information to criminally investigate or   
prosecute any alcohol or drug abuse patient.[facility]In the event this   
information is protected by the Federal Confidentiality of Alcohol and Drug 
Abuse   
Patient Records regulations: This information has been disclosed to you from 
records   
protected by Federal confidentiality rules (42 CFR Part 2). The Federal rules   
prohibit you from making any further disclosure of this information unless 
further   
disclosure is expressly permitted by the written consent of the person to whom 
it   
pertains or as otherwise permitted by 42 CFR Part 2. A general authorization for
 the   
release of medical or other information is NOT sufficient for this purpose. The   
Federal rules restrict any use of the information to criminally investigate or   
prosecute any alcohol or drug abuse patient.[facility]In the event this   
information is protected by the Federal Confidentiality of Alcohol and Drug 
Abuse   
Patient Records regulations: This information has been disclosed to you from 
records   
protected by Federal confidentiality rules (42 CFR Part 2). The Federal rules   
prohibit you from making any further disclosure of this information unless 
further   
disclosure is expressly permitted by the written consent of the person to whom 
it   
pertains or as otherwise permitted by 42 CFR Part 2. A general authorization for
 the   
release of medical or other information is NOT sufficient for this purpose. The   
Federal rules restrict any use of the information to criminally investigate or   
prosecute any alcohol or drug abuse patient.[facility]In the event this   
information is protected by the Federal Confidentiality of Alcohol and Drug 
Abuse   
Patient Records regulations: This information has been disclosed to you from 
records   
protected by Federal confidentiality rules (42 CFR Part 2). The Federal rules   
prohibit you from making any further disclosure of this information unless 
further   
disclosure is expressly permitted by the written consent of the person to whom 
it   
pertains or as otherwise permitted by 42 CFR Part 2. A general authorization for
 the   
release of medical or other information is NOT sufficient for this purpose. The   
Federal rules restrict any use of the information to criminally investigate or   
prosecute any alcohol or drug abuse patient.[facility]In the event this   
information is protected by the Federal Confidentiality of Alcohol and Drug 
Abuse   
Patient Records regulations: This information has been disclosed to you from 
records   
protected by Federal confidentiality rules (42 CFR Part 2). The Federal rules   
prohibit you from making any further disclosure of this information unless 
further   
disclosure is expressly permitted by the written consent of the person to whom 
it   
pertains or as otherwise permitted by 42 CFR Part 2. A general authorization for
 the   
release of medical or other information is NOT sufficient for this purpose. The   
Federal rules restrict any use of the information to criminally investigate or   
prosecute any alcohol or drug abuse patient.[facility]In the event this   
information is protected by the Federal Confidentiality of Alcohol and Drug 
Abuse   
Patient Records regulations: This information has been disclosed to you from 
records   
protected by Federal confidentiality rules (42 CFR Part 2). The Federal rules   
prohibit you from making any further disclosure of this information unless 
further   
disclosure is expressly permitted by the written consent of the person to whom 
it   
pertains or as otherwise permitted by 42 CFR Part 2. A general authorization for
 the   
release of medical or other information is NOT sufficient for this purpose. The   
Federal rules restrict any use of the information to criminally investigate or   
prosecute any alcohol or drug abuse patient.[facility]In the event this   
information is protected by the Federal Confidentiality of Alcohol and Drug 
Abuse   
Patient Records regulations: This information has been disclosed to you from 
records   
protected by Federal confidentiality rules (42 CFR Part 2). The Federal rules   
prohibit you from making any further disclosure of this information unless 
further   
disclosure is expressly permitted by the written consent of the person to whom 
it   
pertains or as otherwise permitted by 42 CFR Part 2. A general authorization for
 the   
release of medical or other information is NOT sufficient for this purpose. The   
Federal rules restrict any use of the information to criminally investigate or   
prosecute any alcohol or drug abuse patient.[facility]In the event this   
information is protected by the Federal Confidentiality of Alcohol and Drug 
Abuse   
Patient Records regulations: This information has been disclosed to you from 
records   
protected by Federal confidentiality rules (42 CFR Part 2). The Federal rules   
prohibit you from making any further disclosure of this information unless 
further   
disclosure is expressly permitted by the written consent of the person to whom 
it   
pertains or as otherwise permitted by 42 CFR Part 2. A general authorization for
 the   
release of medical or other information is NOT sufficient for this purpose. The   
Federal rules restrict any use of the information to criminally investigate or   
prosecute any alcohol or drug abuse patient.[facility]In the event this   
information is protected by the Federal Confidentiality of Alcohol and Drug 
Abuse   
Patient Records regulations: This information has been disclosed to you from 
records   
protected by Federal confidentiality rules (42 CFR Part 2). The Federal rules   
prohibit you from making any further disclosure of this information unless 
further   
disclosure is expressly permitted by the written consent of the person to whom 
it   
pertains or as otherwise permitted by 42 CFR Part 2. A general authorization for
 the   
release of medical or other information is NOT sufficient for this purpose. The   
Federal rules restrict any use of the information to criminally investigate or   
prosecute any alcohol or drug abuse patient.[facility]In the event this   
information is protected by the Federal Confidentiality of Alcohol and Drug 
Abuse   
Patient Records regulations: This information has been disclosed to you from 
records   
protected by Federal confidentiality rules (42 CFR Part 2). The Federal rules   
prohibit you from making any further disclosure of this information unless 
further   
disclosure is expressly permitted by the written consent of the person to whom 
it   
pertains or as otherwise permitted by 42 CFR Part 2. A general authorization for
 the   
release of medical or other information is NOT sufficient for this purpose. The   
Federal rules restrict any use of the information to criminally investigate or   
prosecute any alcohol or drug abuse patient.[facility]In the event this   
information is protected by the Federal Confidentiality of Alcohol and Drug 
Abuse   
Patient Records regulations: This information has been disclosed to you from 
records   
protected by Federal confidentiality rules (42 CFR Part 2). The Federal rules   
prohibit you from making any further disclosure of this information unless 
further   
disclosure is expressly permitted by the written consent of the person to whom 
it   
pertains or as otherwise permitted by 42 CFR Part 2. A general authorization for
 the   
release of medical or other information is NOT sufficient for this purpose. The   
Federal rules restrict any use of the information to criminally investigate or   
prosecute any alcohol or drug abuse patient.[facility]In the event this   
information is protected by the Federal Confidentiality of Alcohol and Drug 
Abuse   
Patient Records regulations: This information has been disclosed to you from 
records   
protected by Federal confidentiality rules (42 CFR Part 2). The Federal rules   
prohibit you from making any further disclosure of this information unless 
further   
disclosure is expressly permitted by the written consent of the person to whom 
it   
pertains or as otherwise permitted by 42 CFR Part 2. A general authorization for
 the   
release of medical or other information is NOT sufficient for this purpose. The   
Federal rules restrict any use of the information to criminally investigate or   
prosecute any alcohol or drug abuse patient.[facility] the event this   
information is protected by the Federal Confidentiality of Alcohol and Drug 
Abuse   
Patient Records regulations: This information has been disclosed to you from 
records   
protected by Federal confidentiality rules (42 CFR Part 2). The Federal rules   
prohibit you from making any further disclosure of this information unless 
further   
disclosure is expressly permitted by the written consent of the person to whom 
it   
pertains or as otherwise permitted by 42 CFR Part 2. A general authorization for
 the   
release of medical or other information is NOT sufficient for this purpose. The   
Federal rules restrict any use of the information to criminally investigate or   
prosecute any alcohol or drug abuse patient.[facility] SouthIn the event this   
information is protected by the Federal Confidentiality of Alcohol and Drug 
Abuse   
Patient Records regulations: This information has been disclosed to you from 
records   
protected by Federal confidentiality rules (42 CFR Part 2). The Federal rules   
prohibit you from making any further disclosure of this information unless 
further   
disclosure is expressly permitted by the written consent of the person to whom 
it   
pertains or as otherwise permitted by 42 CFR Part 2. A general authorization for
 the   
release of medical or other information is NOT sufficient for this purpose. The   
Federal rules restrict any use of the information to criminally investigate or   
prosecute any alcohol or drug abuse patient.[facility]In the event this   
information is protected by the Federal Confidentiality of Alcohol and Drug 
Abuse   
Patient Records regulations: This information has been disclosed to you from 
records   
protected by Federal confidentiality rules (42 CFR Part 2). The Federal rules   
prohibit you from making any further disclosure of this information unless 
further   
disclosure is expressly permitted by the written consent of the person to whom 
it   
pertains or as otherwise permitted by 42 CFR Part 2. A general authorization for
 the   
release of medical or other information is NOT sufficient for this purpose. The   
Federal rules restrict any use of the information to criminally investigate or   
prosecute any alcohol or drug abuse patient.[facility]In the event this   
information is protected by the Federal Confidentiality of Alcohol and Drug 
Abuse   
Patient Records regulations: This information has been disclosed to you from 
records   
protected by Federal confidentiality rules (42 CFR Part 2). The Federal rules   
prohibit you from making any further disclosure of this information unless 
further   
disclosure is expressly permitted by the written consent of the person to whom 
it   
pertains or as otherwise permitted by 42 CFR Part 2. A general authorization for
 the   
release of medical or other information is NOT sufficient for this purpose. The   
Federal rules restrict any use of the information to criminally investigate or   
prosecute any alcohol or drug abuse patient.[facility] SouthIn the event this   
information is protected by the [facility] Confidentiality of Alcohol and Drug 
Abuse   
Patient Records regulations: This information has been disclosed to you from 
records   
protected by Federal confidentiality rules (42 CFR Part 2). The Federal rules   
prohibit you from making any further disclosure of this information unless 
further   
disclosure is expressly permitted by the written consent of the person to whom 
it   
pertains or as otherwise permitted by 42 CFR Part 2. A general authorization for
 the   
release of medical or other information is NOT sufficient for this purpose. The   
Federal rules restrict any use of the information to criminally investigate or   
prosecute any alcohol or drug abuse patient.[facility] SouthIn the event this   
information is protected by the Federal Confidentiality of Alcohol and Drug 
Abuse   
Patient Records regulations: This information has been disclosed to you from 
records   
protected by Federal confidentiality rules (42 CFR Part 2). The Federal rules   
prohibit you from making any further disclosure of this information unless 
further   
disclosure is expressly permitted by the written consent of the person to whom 
it   
pertains or as otherwise permitted by 42 CFR Part 2. A general authorization for
 the   
release of medical or other information is NOT sufficient for this purpose. The   
Federal rules restrict any use of the information to criminally investigate or   
prosecute any alcohol or drug abuse patient.[facility]  
  
  
  
  
                                                    Ordered Prescriptions (unrec  
ognized section and content)  
   
                                          
  
  
  
                    Prescription Sig       Dispensed Refills   Start Date End Da  
te  
   
                                                      
  
  
docusate sodium   
(COLACE) 100 MG capsule                 Take 1 capsule by   
mouth 2 times daily   
for 14 days                               
  
  
28 capsule          0                   2022  
   
                                                      
  
  
methocarbamol   
(ROBAXIN-750) 750 MG   
tablet                                  Take 2 tablets by   
mouth 3 times daily   
for 14 days                               
  
  
84 tablet           0                   2022  
   
                                                      
  
  
oxyCODONE-acetaminophen   
(PERCOCET) 5-325 MG per   
tabletIndications:Statu  
s post spinal surgery                   Take 1-2 tablets by   
mouth every 6 hours   
as needed for Pain   
(MAX 6 TABLETS   
DAILY) for up to 7   
days. MAX 6 TABLETS   
DAILY. Intended   
supply: 7 days.   
Take lowest dose   
possible to manage   
pain                                      
  
  
42 tablet           0                   2022          2022  
  
  
  
                    Prescription Sig       Dispensed Refills   Start Date End Da  
te  
   
                                                      
  
  
cyclobenzaprine   
(FLEXERIL) 5 MG tablet                  Take 1 tablet by   
mouth 3 times daily   
as needed for   
Muscle spasms                             
  
  
30 tablet           0                   2022  
   
                                                      
  
  
docusate sodium   
(COLACE) 100 MG capsule                 Take 1 capsule by   
mouth 2 times daily   
for 14 days                               
  
  
28 capsule          0                   2022  
   
                                                      
  
  
oxyCODONE-acetaminophen   
(PERCOCET) 5-325 MG per   
tabletIndications:Statu  
s post spinal surgery                   Take 1-2 tablets by   
mouth every 6 hours   
as needed for Pain   
(MAX 6 TABLETS   
DAILY) for up to 7   
days. MAX 6 TABLETS   
DAILY. Intended   
supply: 7 days.   
Take lowest dose   
possible to manage   
pain                                      
  
  
42 tablet           0                   2022  
   
                                                      
  
  
methocarbamol   
(ROBAXIN-750) 750 MG   
tablet                                  Take 2 tablets by   
mouth 3 times daily   
for 14 days                               
  
  
84 tablet           0                   2022  
  
  
  
                                    Scheduled  
  
                                                    Active and Recently Administ  
ered Medications (unrecognized section and content)  
   
                                          
  
  
  
                          Medication Order 2022  
   
                                                      
  
  
acetaminophen (TYLENOL) tablet   
1,000 mg (COMPLETED)  
1,000 mg, Oral, ONCE, 1 dose, On   
22 at 0615, Maximum dose   
of acetaminophen is 4000 mg from   
all sources in 24 hours. Do not   
administer if patient has taken   
tylenol <4 hours earlier. Do not   
give if contraindicated ie.   
patient has active liver disease   
or cirrhosis., Pre-op (day of   
surgery)  
                                                            0621 (Given - Provid  
er: Rachel Brown RN)  
  
   
                                                      
  
  
aspirin EC tablet 162 mg  
162 mg, Oral, ONCE, 1 dose, On 22 at 1345, Do not crush or   
break.  
                                                            1324 (Held by provid  
er -   
Provider: Jessica Earl RN - Reason: Other - Comment:   
May resume POD#[phone]   
(Automatically Held)  
  
   
                                                      
  
  
ceFAZolin (ANCEF) 2000 mg in   
dextrose 4 % 100 mL IVPB (premix)  
2,000 mg, IntraVENous, EVERY 8   
HOURS, 2 doses, First dose on 22 at 1345, Last dose on 22 at 2145, Antimicrobial   
Indications: Surgical Prophylaxis,   
Post-op  
                                                            134 (Due) (Due)  
  
   
                                                      
  
  
celecoxib (CELEBREX) capsule 200   
mg (COMPLETED)  
200 mg, Oral, ONCE, 1 dose, On 22 at 0615, Avoid with sulfa   
allergy or creatinine greater than   
1.5. Avoid severe hepatic   
impairment or renal transplant.,   
Pre-op (day of surgery)  
                                                            0621 (Given - Provid  
er: Rachel Brown RN)  
  
   
                                                      
  
  
clopidogrel (PLAVIX) tablet 75 mg  
75 mg, Oral, DAILY, First dose on   
22 at 1345, Until   
Discontinued  
                                                            1324 (Held by [facility]  
er -   
Provider: Jessica Earl RN - Reason: Other - Comment:   
Please resume POD#[id number]   
(Automatically Held)  
  
   
                                                      
  
  
dexamethasone (DECADRON) injection   
6 mg  
6 mg, IntraVENous, EVERY 6 HOURS,   
First dose on 22 at 1345,   
Post-op  
                                                            1345 (Due) (Due)  
  
   
                                                      
  
  
famotidine (PEPCID) tablet 20 mg   
(COMPLETED)  
20 mg, Oral, ONCE, 1 dose, On 22 at 0615, Pre-op (day of   
surgery)  
                                                            0621 (Given - Provid  
er: Rachel Brown RN)  
  
   
                                                      
  
  
lisinopril (PRINIVIL;ZESTRIL)   
tablet 2.5 mg  
2.5 mg, Oral, DAILY, First dose on   
22 at 1345, Until   
Discontinued  
                                                            134 (Due)  
  
   
                                                      
  
  
methocarbamol (ROBAXIN) tablet   
1,500 mg  
1,500 mg, Oral, 3 TIMES DAILY, 3   
doses, First dose on 22   
at 1400, Last dose on 22   
at 0900, Post-op  
                                                            1400 (Due)2100 (Due)  
  
   
                                                      
  
  
potassium gluconate tablet 550 mg  
1 gram potassium gluconate = 167   
mg elemental potassium = 4.3 mEq   
potassium = 4.3 mmol potassium,   
550 mg, Oral, DAILY, First dose on   
22 at 1345, Until   
Discontinued  
                                                            1345 (Due)  
  
   
                                                      
  
  
rosuvastatin (CRESTOR) tablet 5 mg  
5 mg, Oral, NIGHTLY, First dose on   
22 at 2100, Until   
Discontinued  
                                                            2100 (Due)  
  
   
                                                      
  
  
saccharomyces boulardii   
(FLORASTOR) capsule 250 mg  
250 mg, Oral, DAILY, First dose on   
22 at 1345, Until   
Discontinued  
                                                            1345 (Due)  
  
   
                                                      
  
  
sennosides-docusate sodium   
(SENOKOT-S) 8.6-50 MG tablet 1   
tablet  
1 tablet, Oral, DAILY, First dose   
on 22 at 1345, Until   
Discontinued, Post-op  
                                                            1345 (Due)  
  
   
                                                      
  
  
sodium chloride flush 0.9 %   
injection 5-40 mL  
5-40 mL, IntraVENous, EVERY 12   
HOURS SCHEDULED (2 times per day),   
First dose on 22 at 2100,   
Until Discontinued, For Line   
Patency: Peripheral IV = 5 mL;   
Midline or Central Line = 10   
mL/lumen. If following IV push   
medication, administer flush at   
same rate as the IV push. Flush   
volume is determined by type of   
infusion therapy being given. For   
non-viscous solutions use:   
Peripheral IV = 5 mL Midline or   
Central Line = 10 mL/lumen For   
viscous solutions (i.e. blood   
components, parenteral nutrition,   
contrast media, or after obtaining   
blood sample) use: Peripheral IV =   
10 mL Midline or Central Line = 20   
mL/lumen, Post-op  
                                                            2100 (Due)  
  
  
                                   Continuous  
  
                          Medication Order 2022  
   
                                                      
  
  
lactated ringers infusion   
(CANCELED)  
IntraVENous, at 50 mL/hr,   
CONTINUOUS, Starting on 22 at 0615, Upon admission   
to sameday - please start iv if   
patient does not have iv access.   
Use 500ml NS for patients on   
dialysis., Pre-op (day of   
surgery)  
                                                            0622 (New Bag - Prov  
ider: Rachel Brown RN)  
  
  
                                       PRN  
  
                          Medication Order 2022  
   
                                                      
  
  
0.9 % sodium chloride infusion  
IntraVENous, at 5-250 mL/hr, PRN,   
if patient receiving piggyback   
infusions and maintenance fluids   
are not ordered OR KVO fluids to   
protect IV site / prevent frequent   
line interruptions/ long duration,   
Starting on 22 at 1324,   
For piggyback infusion, administer   
at same rate as piggyback for a   
total of 25 mL. Enter 25 mL into   
dose field and piggyback rate into   
rate field of order. If piggyback   
is infusing at a rate less than   
100 mL/hr, enter 25 mL into dose   
field and 100 mL/hr into rate   
field of order. For KVO fluids,   
enter rate of 20 mL/hr or less   
into rate field of order., Post-op  
                                                              
   
                                                      
  
  
acetaminophen (TYLENOL) tablet   
1,000 mg  
1,000 mg, Oral, EVERY 6 HOURS PRN,   
Starting on 22 at 1324,   
Until Discontinued, Pain Mild   
(1-3), Maximum dose of   
acetaminophen is 4000 mg from all   
sources in 24 hours., Post-op  
                                                              
   
                                                      
  
  
albuterol (PROVENTIL) nebulizer   
solution 2.5 mg  
2.5 mg, Nebulization, EVERY 6   
HOURS PRN, Starting on 22   
at 1324, Until Discontinued,   
Wheezing, Shortness of Breath,   
Initiate RT Bronchodilator   
Protocol: Yes  
                                                              
   
                                                      
  
  
HYDROmorphone (DILAUDID) injection   
0.5 mg (CANCELED)  
HYDROmorphone (DILAUDID) 1.5mg IV   
is equivalent to morphine 10mg IV,   
0.5 mg, IntraVENous, EVERY 5 MIN   
PRN, 4 doses, Starting on 22 at 0648, Until 22   
at 1323, Pain Severe (7-10), Phase   
I - Initial therapy for severe   
pain. Restricted to a 90 minute   
time frame starting when the   
patient can verbally state their   
pain score., PACU only  
                                                            1142 (Given - Provid  
er: Jessica Hardy RN)1158 (Given -   
Provider: Rosi Sosa RN)  
  
   
                                                      
  
  
levalbuterol (XOPENEX HFA) inhaler   
2 puff  
2 puff, Inhalation, EVERY 6 HOURS   
PRN, Starting on 22 at   
1324, Until Discontinued,   
Wheezing, Please select a reason   
the therapeutic interchange was   
not accepted: Okay for Pharmacy to   
Substitute  
                                                              
   
                                                      
  
  
morphine sulfate (PF) injection 4   
mg  
4 mg, IntraVENous, EVERY 3 HOURS   
PRN, Starting on 22 at   
1324, Until Discontinued, Pain   
Severe (7-10), If oral and IV   
narcotics ordered, use oral first   
and only use IV if oral is   
ineffective or cannot take oral.   
Do Not give oral and IV within 1   
hour of each other unless   
specifically ordered., Post-op  
                                                              
   
                                                      
  
  
nitroGLYCERIN (NITROSTAT) SL   
tablet 0.4 mg  
0.4 mg, SubLINGual, EVERY 5 MIN   
PRN, Starting on 22 at   
1324, Until Discontinued, Chest   
pain, Place 1 tablet under tongue   
upon chest pain, wait 5 minutes   
and may repeat up to 3 doses in 15   
minutes. Do not crush or break.  
                                                              
   
                                                      
  
  
ondansetron (ZOFRAN) injection 4   
mg(Linked Group 1)  
4 mg, IntraVENous, EVERY 6 HOURS   
PRN, Starting on 22 at   
1324, Until Discontinued, Nausea,   
Vomiting, Administer if oral route   
cannot be used., Post-op  
                                                              
   
                                                      
  
  
ondansetron (ZOFRAN-ODT)   
disintegrating tablet 4 mg(Linked   
Group 1)  
4 mg, Oral, EVERY 8 HOURS PRN,   
Starting on 22 at 1324,   
Until Discontinued, Nausea,   
Vomiting, Post-op  
                                                              
   
                                                      
  
  
oxyCODONE (ROXICODONE) immediate   
release tablet 5 mg  
5 mg, Oral, EVERY 4 HOURS PRN,   
Starting on 22 at 1324,   
Until Discontinued, Pain Mild   
(1-3), Pain Moderate (4-6),   
Post-op  
                                                              
   
                                                      
  
  
sodium chloride flush 0.9 %   
injection 5-40 mL  
5-40 mL, IntraVENous, PRN,   
Starting on 22 at 1324,   
Until Discontinued, Line Care,   
After every IV line use, For Line   
Patency: Peripheral IV = 5 mL;   
Midline or Central Line = 10   
mL/lumen. If following IV push   
medication, administer flush at   
same rate as the IV push. Flush   
volume is determined by type of   
infusion therapy being given. For   
non-viscous solutions use:   
Peripheral IV = 5 mL Midline or   
Central Line = 10 mL/lumen For   
viscous solutions (i.e. blood   
components, parenteral nutrition,   
contrast media, or after obtaining   
blood sample) use: Peripheral IV =   
10 mL Midline or Central Line = 20   
mL/lumen, Post-op  
                                                              
  
                                  Linked Groups  
  
                                                    Order  
   
                                                      
  
  
Group 1:  
  
  
ondansetron (ZOFRAN-ODT) disintegrating tablet 4 mgJump to med  
4 mg, Oral, EVERY 8 HOURS PRN, Starting on 22 at 1324, Until 
Discontinued,   
Nausea, Vomiting, Post-op  
  
   
                                                      
  
  
Or  
  
  
ondansetron (ZOFRAN) injection 4 mgJump to med  
4 mg, IntraVENous, EVERY 6 HOURS PRN, Starting on 22 at 1324, Until   
Discontinued, Nausea, Vomiting<br>Administer if oral route cannot be 
used.<br>Post-op  
  
  
                                    Scheduled  
  
                          Medication Order 2022   2022   2022  
   
                                                      
  
  
acetaminophen (TYLENOL)   
tablet 1,000 mg (COMPLETED)  
1,000 mg, Oral, ONCE, 1   
dose, On 22 at   
0615, Maximum dose of   
acetaminophen is 4000 mg   
from all sources in 24   
hours. Do not administer if   
patient has taken tylenol <4   
hours earlier. Do not give   
if contraindicated ie.   
patient has active liver   
disease or cirrhosis.,   
Pre-op (day of surgery)  
                                                    06 (Given - Provider:   
Rachel Brown RN)  
                                          
   
                                                      
  
  
aspirin EC tablet 162 mg  
162 mg, Oral, ONCE, 1 dose,   
On 22 at 1345, Do   
not crush or break.  
                                                    1324 (Held by provider   
- Provider: Jessica Earl RN - Reason:   
Other - Comment: May   
resume POD#[id number]   
(Automatically Held)  
                                          
   
                                                      
  
  
ceFAZolin (ANCEF) 2000 mg in   
dextrose 4 % 100 mL IVPB   
(premix) (COMPLETED)  
2,000 mg, IntraVENous, EVERY   
8 HOURS, 2 doses, First dose   
on 22 at 1600, Last   
dose on 22 at 0000,   
Antimicrobial Indications:   
Surgical Prophylaxis,   
Post-op  
                                                    1648 (New Bag -   
Provider: Jessica Earl RN)1718   
(Stopped - Provider:   
Jessica Earl RN)  
                                        0022 (New Bag -   
Provider: Danyell Wright RN)0052   
(Stopped - Provider:   
Danyell Wright RN)  
  
   
                                                      
  
  
celecoxib (CELEBREX) capsule   
200 mg (COMPLETED)  
200 mg, Oral, ONCE, 1 dose,   
On 22 at 0615,   
Avoid with sulfa allergy or   
creatinine greater than 1.5.   
Avoid severe hepatic   
impairment or renal   
transplant., Pre-op (day of   
surgery)  
                                                    06 (Given - Provider:   
Rachel Brown RN)  
                                          
   
                                                      
  
  
clopidogrel (PLAVIX) tablet   
75 mg  
75 mg, Oral, DAILY, First   
dose on 22 at 1345,   
Until Discontinued  
                                                    1324 (Held by provider   
- Provider: Jessica Earl RN - Reason:   
Other - Comment: Please   
resume POD#[id number]   
(Automatically Held)  
                                        0900 (Automatically   
Held)  
  
   
                                                      
  
  
dexamethasone (DECADRON)   
injection 6 mg  
6 mg, IntraVENous, EVERY 6   
HOURS, First dose on 22 at 1345, Post-op  
                                                    1413 (Given - Provider:   
Jessica Earl RN) (Given -   
Provider: Danyell Wright RN)  
                                        0142 (Given - Provider:   
Danyell Wright RN)0841   
(Given - Provider:   
Jessica Earl RN)1345 (Due)194 (Due)  
  
   
                                                      
  
  
famotidine (PEPCID) tablet   
20 mg (COMPLETED)  
20 mg, Oral, ONCE, 1 dose,   
On 22 at 0615,   
Pre-op (day of surgery)  
                                                    0621 (Given - Provider:   
Rachel Brown RN)  
                                          
   
                                                      
  
  
lisinopril   
(PRINIVIL;ZESTRIL) tablet   
2.5 mg  
2.5 mg, Oral, DAILY, First   
dose on 22 at 1345,   
Until Discontinued  
                                                    165 (Given - Provider:   
Jessica Earl RN)  
                                        0840 (Given - Provider:   
Jessica Earl RN)  
  
   
                                                      
  
  
methocarbamol (ROBAXIN)   
injection 1,000 mg  
Max of 3 grams/day for no   
more than 3 consecutive   
days. May repeat course   
after 48 hour drug-free   
interval., 1,000 mg,   
IntraVENous, EVERY 8 HOURS,   
First dose on 22 at   
1930, Until Discontinued  
                                                     (Given - Provider:   
Danyell Wright RN)  
                                        0346 (Given - Provider:   
Danyell Wright RN)1309   
(Not Given - Provider:   
Jessica Earl RN -   
Reason: Other -   
Comment:   
discharged)1930 (Due)  
  
   
                                                      
  
  
rosuvastatin (CRESTOR)   
tablet 5 mg  
5 mg, Oral, NIGHTLY, First   
dose on 22 at 2100,   
Until Discontinued  
                                                            002 (Not Given -   
Provider: Danyell Wright RN - Reason:   
Patient/family   
refused)2100 (Due)  
  
   
                                                      
  
  
sennosides-docusate sodium   
(SENOKOT-S) 8.6-50 MG tablet   
1 tablet  
1 tablet, Oral, DAILY, First   
dose on 22 at 1345,   
Until Discontinued, Post-op  
                                                    1649 (Not Given -   
Provider: Jessica Earl RN - Reason:   
Patient/family refused)  
                                        0840 (Given - Provider:   
Jessica Earl RN)  
  
   
                                                      
  
  
sodium chloride flush 0.9 %   
injection 5-40 mL  
5-40 mL, IntraVENous, EVERY   
12 HOURS SCHEDULED (2 times   
per day), First dose on 22 at 2100, Until   
Discontinued, For Line   
Patency: Peripheral IV = 5   
mL; Midline or Central Line   
= 10 mL/lumen. If following   
IV push medication,   
administer flush at same   
rate as the IV push. Flush   
volume is determined by type   
of infusion therapy being   
given. For non-viscous   
solutions use: Peripheral IV   
= 5 mL Midline or Central   
Line = 10 mL/lumen For   
viscous solutions (i.e.   
blood components, parenteral   
nutrition, contrast media,   
or after obtaining blood   
sample) use: Peripheral IV =   
10 mL Midline or Central   
Line = 20 mL/lumen, Post-op  
                                                    2017 (Given - Provider:   
Danyell Wright, [place])  
                                        0842 (Given - Provider:   
Jessica Earl RN)2100 (Due)  
  
  
                                   Continuous  
  
                          Medication Order 2022  
   
                                                      
  
  
lactated ringers infusion   
(CANCELED)  
IntraVENous, at 50 mL/hr,   
CONTINUOUS, Starting on 22 at 0615, Upon admission   
to sameday - please start iv if   
patient does not have iv access.   
Use 500ml NS for patients on   
dialysis., Pre-op (day of   
surgery)  
                                                    0622 (New Bag - Provider: Payal Brown RN)  
                                          
  
                                       PRN  
  
                          Medication Order 2022  
   
                                                      
  
  
0.9 % sodium chloride   
infusion  
IntraVENous, at 5-250 mL/hr,   
PRN, if patient receiving   
piggyback infusions and   
maintenance fluids are not   
ordered OR KVO fluids to   
protect IV site / prevent   
frequent line interruptions/   
long duration, Starting on   
22 at 1324, For   
piggyback infusion,   
administer at same rate as   
piggyback for a total of 25   
mL. Enter 25 mL into dose   
field and piggyback rate   
into rate field of order. If   
piggyback is infusing at a   
rate less than 100 mL/hr,   
enter 25 mL into dose field   
and 100 mL/hr into rate   
field of order. For KVO   
fluids, enter rate of 20   
mL/hr or less into rate   
field of order., Post-op  
                                                              
   
                                                      
  
  
acetaminophen (TYLENOL)   
tablet 1,000 mg  
1,000 mg, Oral, EVERY 6   
HOURS PRN, Starting on 22 at 1324, Until   
Discontinued, Pain Mild   
(1-3), Maximum dose of   
acetaminophen is 4000 mg   
from all sources in 24   
hours., Post-op  
                                                              
   
                                                      
  
  
albuterol (PROVENTIL)   
nebulizer solution 2.5 mg  
2.5 mg, Nebulization, EVERY   
6 HOURS PRN, Starting on 22 at 1324, Until   
Discontinued, Wheezing,   
Shortness of Breath,   
Initiate RT Bronchodilator   
Protocol: Yes  
                                                              
   
                                                      
  
  
HYDROmorphone (DILAUDID)   
injection 0.5 mg (CANCELED)  
HYDROmorphone (DILAUDID)   
1.5mg IV is equivalent to   
morphine 10mg IV, 0.5 mg,   
IntraVENous, EVERY 5 MIN   
PRN, 4 doses, Starting on   
22 at 0648, Until   
22 at 1323, Pain   
Severe (7-10), Phase I -   
Initial therapy for severe   
pain. Restricted to a 90   
minute time frame starting   
when the patient can   
verbally state their pain   
score., PACU only  
                                                    1142 (Given - Provider:   
Jessica Hardy RN)1158   
(Given - Provider:   
Rosi Sosa RN)  
                                          
   
                                                      
  
  
levalbuterol (XOPENEX)   
nebulization 0.63 mg  
0.63 mg, Nebulization, EVERY   
6 HOURS PRN, Starting on 22 at 1401, Until   
Discontinued, Wheezing,   
Formulary substitute for   
Xopenex HFA  
                                                              
   
                                                      
  
  
morphine sulfate (PF)   
injection 1 mg  
1 mg, IntraVENous, EVERY 4   
HOURS PRN, Starting on 22 at 1505, Until   
Discontinued, Pain Moderate   
(4-6), If oral and IV   
narcotics ordered, use oral   
first and only use IV if   
oral is ineffective or   
cannot take oral. Do Not   
give oral and IV within 1   
hour of each other unless   
specifically ordered.  
                                                              
   
                                                      
  
  
morphine sulfate (PF)   
injection 2 mg  
2 mg, IntraVENous, EVERY 4   
HOURS PRN, Starting on 22 at 1505, Until   
Discontinued, Pain Severe   
(7-10), If oral and IV   
narcotics ordered, use oral   
first and only use IV if   
oral is ineffective or   
cannot take oral. Do Not   
give oral and IV within 1   
hour of each other unless   
specifically ordered.  
                                                    191 (Given - Provider:   
Jessica Earl RN)  
                                        31 (Given - Provider:   
Danyell Wright RN)  
  
   
                                                      
  
  
morphine sulfate (PF)   
injection 4 mg (CANCELED)  
4 mg, IntraVENous, EVERY 3   
HOURS PRN, Starting on 22 at 1324, Until 22 at 1505, Pain Severe   
(7-10), If oral and IV   
narcotics ordered, use oral   
first and only use IV if   
oral is ineffective or   
cannot take oral. Do Not   
give oral and IV within 1   
hour of each other unless   
specifically ordered.,   
Post-op  
                                                    1456 (Given - Provider:   
Jessica Earl RN)  
                                          
   
                                                      
  
  
nitroGLYCERIN (NITROSTAT) SL   
tablet 0.4 mg  
0.4 mg, SubLINGual, EVERY 5   
MIN PRN, Starting on 22 at 1324, Until   
Discontinued, Chest pain,   
Place 1 tablet under tongue   
upon chest pain, wait 5   
minutes and may repeat up to   
3 doses in 15 minutes. Do   
not crush or break.  
                                                              
   
                                                      
  
  
ondansetron (ZOFRAN)   
injection 4 mg(Linked Group   
1)  
4 mg, IntraVENous, EVERY 6   
HOURS PRN, Starting on 22 at 1324, Until   
Discontinued, Nausea,   
Vomiting, Administer if oral   
route cannot be used.,   
Post-op  
                                                              
   
                                                      
  
  
ondansetron (ZOFRAN-ODT)   
disintegrating tablet 4   
mg(Linked Group 1)  
4 mg, Oral, EVERY 8 HOURS   
PRN, Starting on 22   
at 1324, Until Discontinued,   
Nausea, Vomiting, Post-op  
                                                              
   
                                                      
  
  
oxyCODONE (ROXICODONE)   
immediate release tablet 2.5   
mg  
2.5 mg, Oral, EVERY 4 HOURS   
PRN, Starting on 22   
at 1504, Until Discontinued,   
Pain Moderate (4-6)  
                                                              
   
                                                      
  
  
oxyCODONE (ROXICODONE)   
immediate release tablet 5   
mg  
5 mg, Oral, EVERY 6 HOURS   
PRN, Starting on 22   
at 1504, Until Discontinued,   
Pain Severe (7-10), Post-op  
                                                    1812 (Given - Provider:   
Jessica Earl RN)  
                                        0847 (Given - Provider:   
Jessica Earl RN)  
  
   
                                                      
  
  
phenol 1.4 % mouth spray 1   
spray  
1 spray, Mouth/Throat, EVERY   
2 HOURS PRN, Starting on 22 at 0123, Until   
Discontinued, Sore Throat  
                                                            0142 (Given - Provid  
er:   
Danyell Wright RN)  
  
   
                                                      
  
  
sodium chloride flush 0.9 %   
injection 5-40 mL  
5-40 mL, IntraVENous, PRN,   
Starting on 22 at   
1324, Until Discontinued,   
Line Care, After every IV   
line use, For Line Patency:   
Peripheral IV = 5 mL;   
Midline or Central Line = 10   
mL/lumen. If following IV   
push medication, administer   
flush at same rate as the IV   
push. Flush volume is   
determined by type of   
infusion therapy being   
given. For non-viscous   
solutions use: Peripheral IV   
= 5 mL Midline or Central   
Line = 10 mL/lumen For   
viscous solutions (i.e.   
blood components, parenteral   
nutrition, contrast media,   
or after obtaining blood   
sample) use: Peripheral IV =   
10 mL Midline or Central   
Line = 20 mL/lumen, Post-op  
                                                              
  
                                  Linked Groups  
  
                                                    Order  
   
                                                      
  
  
Group 1:  
  
  
ondansetron (ZOFRAN-ODT) disintegrating tablet 4 mgJump to med  
4 mg, Oral, EVERY 8 HOURS PRN, Starting on 22 at 1324, Until 
Discontinued,   
Nausea, Vomiting, Post-op  
  
   
                                                      
  
  
Or  
  
  
ondansetron (ZOFRAN) injection 4 mgJump to med  
4 mg, IntraVENous, EVERY 6 HOURS PRN, Starting on 22 at 1324, Until   
Discontinued, Nausea, Vomiting<br>Administer if oral route cannot be 
used.<br>Post-op  
  
  
  
FOR RECORDS PERTAINING TO PATIENTS WHO ARE OR HAVE BEEN ENROLLED IN A CHEMICAL 
DEPENDENCY/SUBSTANCEABUSE PROGRAM, SOME INFORMATION MAY BE OMITTED. This 
clinical summary was aggregated from multiple sources. Caution should be 
exercised in using it in the provision of clinical care. This summary normalizes
 information from multiple sources, and as a consequence, information in this 
document may materially change the coding, format and clinical context of 
patient data. In addition, data may be omitted in some cases. CLINICAL DECISIONS
 SHOULD BE BASED ON THE PRIMARY CLINICAL RECORDS. [facility]. provides
 no warranty or guarantee of the accuracy or completeness of information in this
 document.
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
